# Patient Record
Sex: MALE | Race: WHITE | NOT HISPANIC OR LATINO | Employment: OTHER | URBAN - METROPOLITAN AREA
[De-identification: names, ages, dates, MRNs, and addresses within clinical notes are randomized per-mention and may not be internally consistent; named-entity substitution may affect disease eponyms.]

---

## 2017-09-18 ENCOUNTER — HOSPITAL ENCOUNTER (OUTPATIENT)
Dept: RADIOLOGY | Facility: MEDICAL CENTER | Age: 57
End: 2017-09-18
Attending: ORTHOPAEDIC SURGERY
Payer: COMMERCIAL

## 2017-09-18 DIAGNOSIS — M25.511 RIGHT SHOULDER PAIN, UNSPECIFIED CHRONICITY: ICD-10-CM

## 2017-09-18 PROCEDURE — 73201 CT UPPER EXTREMITY W/DYE: CPT | Mod: RT

## 2017-09-18 PROCEDURE — 700101 HCHG RX REV CODE 250

## 2017-09-18 PROCEDURE — 77002 NEEDLE LOCALIZATION BY XRAY: CPT | Mod: RT

## 2017-09-18 PROCEDURE — 700117 HCHG RX CONTRAST REV CODE 255: Performed by: ORTHOPAEDIC SURGERY

## 2017-09-18 RX ORDER — LIDOCAINE HYDROCHLORIDE 10 MG/ML
INJECTION, SOLUTION INFILTRATION; PERINEURAL
Status: DISPENSED
Start: 2017-09-18 | End: 2017-09-19

## 2017-09-18 RX ADMIN — IOHEXOL 50 ML: 300 INJECTION, SOLUTION INTRAVENOUS at 13:40

## 2017-10-13 DIAGNOSIS — Z01.812 PRE-OPERATIVE LABORATORY EXAMINATION: ICD-10-CM

## 2017-10-13 LAB
BASOPHILS # BLD AUTO: 0.7 % (ref 0–1.8)
BASOPHILS # BLD: 0.03 K/UL (ref 0–0.12)
CRP SERPL HS-MCNC: 0.11 MG/DL (ref 0–0.75)
EOSINOPHIL # BLD AUTO: 0.15 K/UL (ref 0–0.51)
EOSINOPHIL NFR BLD: 3.3 % (ref 0–6.9)
ERYTHROCYTE [DISTWIDTH] IN BLOOD BY AUTOMATED COUNT: 41.1 FL (ref 35.9–50)
ERYTHROCYTE [SEDIMENTATION RATE] IN BLOOD BY WESTERGREN METHOD: 14 MM/HOUR (ref 0–20)
HCT VFR BLD AUTO: 46.2 % (ref 42–52)
HGB BLD-MCNC: 16.3 G/DL (ref 14–18)
IMM GRANULOCYTES # BLD AUTO: 0.02 K/UL (ref 0–0.11)
IMM GRANULOCYTES NFR BLD AUTO: 0.4 % (ref 0–0.9)
LYMPHOCYTES # BLD AUTO: 1.47 K/UL (ref 1–4.8)
LYMPHOCYTES NFR BLD: 32.5 % (ref 22–41)
MCH RBC QN AUTO: 34.1 PG (ref 27–33)
MCHC RBC AUTO-ENTMCNC: 35.3 G/DL (ref 33.7–35.3)
MCV RBC AUTO: 96.7 FL (ref 81.4–97.8)
MONOCYTES # BLD AUTO: 0.51 K/UL (ref 0–0.85)
MONOCYTES NFR BLD AUTO: 11.3 % (ref 0–13.4)
NEUTROPHILS # BLD AUTO: 2.35 K/UL (ref 1.82–7.42)
NEUTROPHILS NFR BLD: 51.8 % (ref 44–72)
NRBC # BLD AUTO: 0 K/UL
NRBC BLD AUTO-RTO: 0 /100 WBC
PLATELET # BLD AUTO: 225 K/UL (ref 164–446)
PMV BLD AUTO: 9.5 FL (ref 9–12.9)
RBC # BLD AUTO: 4.78 M/UL (ref 4.7–6.1)
WBC # BLD AUTO: 4.5 K/UL (ref 4.8–10.8)

## 2017-10-13 PROCEDURE — 85025 COMPLETE CBC W/AUTO DIFF WBC: CPT

## 2017-10-13 PROCEDURE — 36415 COLL VENOUS BLD VENIPUNCTURE: CPT

## 2017-10-13 PROCEDURE — 86140 C-REACTIVE PROTEIN: CPT

## 2017-10-13 PROCEDURE — 85652 RBC SED RATE AUTOMATED: CPT

## 2017-11-28 ENCOUNTER — HOSPITAL ENCOUNTER (OUTPATIENT)
Facility: MEDICAL CENTER | Age: 57
End: 2017-11-28
Attending: ORTHOPAEDIC SURGERY | Admitting: ORTHOPAEDIC SURGERY
Payer: COMMERCIAL

## 2017-11-28 VITALS
TEMPERATURE: 98.1 F | HEIGHT: 73 IN | HEART RATE: 70 BPM | DIASTOLIC BLOOD PRESSURE: 84 MMHG | OXYGEN SATURATION: 94 % | BODY MASS INDEX: 28.84 KG/M2 | SYSTOLIC BLOOD PRESSURE: 146 MMHG | RESPIRATION RATE: 16 BRPM | WEIGHT: 217.59 LBS

## 2017-11-28 LAB
GRAM STN SPEC: NORMAL
SIGNIFICANT IND 70042: NORMAL
SITE SITE: NORMAL
SOURCE SOURCE: NORMAL

## 2017-11-28 PROCEDURE — 160022 HCHG BLOCK: Performed by: ORTHOPAEDIC SURGERY

## 2017-11-28 PROCEDURE — 160025 RECOVERY II MINUTES (STATS): Performed by: ORTHOPAEDIC SURGERY

## 2017-11-28 PROCEDURE — 700105 HCHG RX REV CODE 258: Performed by: ORTHOPAEDIC SURGERY

## 2017-11-28 PROCEDURE — 160046 HCHG PACU - 1ST 60 MINS PHASE II: Performed by: ORTHOPAEDIC SURGERY

## 2017-11-28 PROCEDURE — 500423 HCHG DRESSING, ABD COMBINE: Performed by: ORTHOPAEDIC SURGERY

## 2017-11-28 PROCEDURE — 700101 HCHG RX REV CODE 250

## 2017-11-28 PROCEDURE — 160035 HCHG PACU - 1ST 60 MINS PHASE I: Performed by: ORTHOPAEDIC SURGERY

## 2017-11-28 PROCEDURE — 700111 HCHG RX REV CODE 636 W/ 250 OVERRIDE (IP)

## 2017-11-28 PROCEDURE — 700102 HCHG RX REV CODE 250 W/ 637 OVERRIDE(OP)

## 2017-11-28 PROCEDURE — A4565 SLINGS: HCPCS | Performed by: ORTHOPAEDIC SURGERY

## 2017-11-28 PROCEDURE — 160002 HCHG RECOVERY MINUTES (STAT): Performed by: ORTHOPAEDIC SURGERY

## 2017-11-28 PROCEDURE — 87205 SMEAR GRAM STAIN: CPT

## 2017-11-28 PROCEDURE — 160009 HCHG ANES TIME/MIN: Performed by: ORTHOPAEDIC SURGERY

## 2017-11-28 PROCEDURE — 87070 CULTURE OTHR SPECIMN AEROBIC: CPT

## 2017-11-28 PROCEDURE — 160029 HCHG SURGERY MINUTES - 1ST 30 MINS LEVEL 4: Performed by: ORTHOPAEDIC SURGERY

## 2017-11-28 PROCEDURE — A4450 NON-WATERPROOF TAPE: HCPCS | Performed by: ORTHOPAEDIC SURGERY

## 2017-11-28 PROCEDURE — 160048 HCHG OR STATISTICAL LEVEL 1-5: Performed by: ORTHOPAEDIC SURGERY

## 2017-11-28 PROCEDURE — 160041 HCHG SURGERY MINUTES - EA ADDL 1 MIN LEVEL 4: Performed by: ORTHOPAEDIC SURGERY

## 2017-11-28 PROCEDURE — 501838 HCHG SUTURE GENERAL: Performed by: ORTHOPAEDIC SURGERY

## 2017-11-28 PROCEDURE — A6222 GAUZE <=16 IN NO W/SAL W/O B: HCPCS | Performed by: ORTHOPAEDIC SURGERY

## 2017-11-28 PROCEDURE — 502581 HCHG PACK, SHOULDER ARTHROSCOPY: Performed by: ORTHOPAEDIC SURGERY

## 2017-11-28 PROCEDURE — 87075 CULTR BACTERIA EXCEPT BLOOD: CPT

## 2017-11-28 PROCEDURE — 502240 HCHG MISC OR SUPPLY RC 0272: Performed by: ORTHOPAEDIC SURGERY

## 2017-11-28 PROCEDURE — A9270 NON-COVERED ITEM OR SERVICE: HCPCS

## 2017-11-28 RX ORDER — EPINEPHRINE 1 MG/ML(1)
AMPUL (ML) INJECTION
Status: DISCONTINUED | OUTPATIENT
Start: 2017-11-28 | End: 2017-11-28 | Stop reason: HOSPADM

## 2017-11-28 RX ORDER — SODIUM CHLORIDE, SODIUM LACTATE, POTASSIUM CHLORIDE, CALCIUM CHLORIDE 600; 310; 30; 20 MG/100ML; MG/100ML; MG/100ML; MG/100ML
INJECTION, SOLUTION INTRAVENOUS CONTINUOUS
Status: DISCONTINUED | OUTPATIENT
Start: 2017-11-28 | End: 2017-11-28 | Stop reason: HOSPADM

## 2017-11-28 RX ORDER — LIDOCAINE HYDROCHLORIDE 10 MG/ML
INJECTION, SOLUTION INFILTRATION; PERINEURAL
Status: COMPLETED
Start: 2017-11-28 | End: 2017-11-28

## 2017-11-28 RX ORDER — LISINOPRIL 10 MG/1
10 TABLET ORAL DAILY
COMMUNITY

## 2017-11-28 RX ORDER — OXYCODONE HCL 5 MG/5 ML
SOLUTION, ORAL ORAL
Status: COMPLETED
Start: 2017-11-28 | End: 2017-11-28

## 2017-11-28 RX ADMIN — SODIUM CHLORIDE, POTASSIUM CHLORIDE, SODIUM LACTATE AND CALCIUM CHLORIDE: 600; 310; 30; 20 INJECTION, SOLUTION INTRAVENOUS at 15:00

## 2017-11-28 RX ADMIN — FENTANYL CITRATE 50 MCG: 50 INJECTION, SOLUTION INTRAMUSCULAR; INTRAVENOUS at 18:40

## 2017-11-28 RX ADMIN — OXYCODONE HYDROCHLORIDE 10 MG: 5 SOLUTION ORAL at 18:40

## 2017-11-28 RX ADMIN — LIDOCAINE HYDROCHLORIDE 0.1 ML: 10 INJECTION, SOLUTION INFILTRATION; PERINEURAL at 15:00

## 2017-11-28 RX ADMIN — FENTANYL CITRATE 50 MCG: 50 INJECTION, SOLUTION INTRAMUSCULAR; INTRAVENOUS at 18:49

## 2017-11-28 ASSESSMENT — PAIN SCALES - GENERAL
PAINLEVEL_OUTOF10: 6
PAINLEVEL_OUTOF10: 6
PAINLEVEL_OUTOF10: 8
PAINLEVEL_OUTOF10: 3
PAINLEVEL_OUTOF10: 7

## 2017-11-28 NOTE — OR NURSING
Patient allergies and NPO status verified, home medication reconciliation completed, belongings secured. Patient verbalizes understanding of pain scale, expected course of stay and plan of care. Surgical site verified with patient, IV access established sequentials and MAIRA hose placed on BLE.

## 2017-11-29 NOTE — OR NURSING
1910 - Report from ANGEL Hyatt. Patient awake and cooperative. Pain 6-7/10 and tolerable per patient - states he had chronic pain. States he has had numerous peripheral nerve blocks before and this block did not work this time. Positive CMS RUE. Dressing clean, dry, and intact. Denies nausea - tolerating sips of water. Patient has his chronic pain medications at home. Will take as directed by his PCP.     1920 - Remains as above. Tolerating juice. VS as noted. Meets criteria and transferred to Stage 2 status in PACU with same RN. Patient assisted to bathroom and voided without difficulty. Assisted with dressing then transferred to lounge chair. Friend to chairside.     1930 - Discharge instructions to patient and friend - state understanding.     1945 - Remains as noted above. VS as noted. Meets criteria and discharged via wheelchair to friend to private vehicle.

## 2017-11-29 NOTE — OP REPORT
DATE OF SERVICE:  11/28/2017    PREOPERATIVE DIAGNOSIS:  Painful right total shoulder arthroplasty, rule out   infection.    POSTOPERATIVE DIAGNOSES:  Painful right total shoulder arthroplasty, rule out   infection, subacromial impingement, glenoid micromotion.    PROCEDURE:  Right shoulder arthroscopy, subacromial decompression, synovial   biopsy, diagnostic arthroscopy.    SURGEON:  Jodie Harris MD    ASSISTANT:  Mike De Los Santos CFA.    ANESTHESIOLOGIST:  Harmeet Wills MD    TYPE OF ANESTHESIA:  General, with preoperative interscalene nerve block.    IV FLUID:  1 liter crystalloid.    ESTIMATED BLOOD LOSS:  Minimal.    DRAINS:  None.    SPECIMENS:  Synovium to microbiology, to be held for 2 weeks for P. acnes   evaluation.    COMPLICATIONS:  None.    IMPLANTS:  None.    REASON FOR PROCEDURE:  The patient is a 57-year-old male who has undergone   multiple past bilateral shoulder procedures, most recently a right total   shoulder arthroplasty with me almost 2 years ago.  He did well initially, but   has developed some ongoing pain in the last several months, with no obvious   pathology noted on laboratory workup or CT arthrogram.  We decided to proceed   with a diagnostic arthroscopy and a synovial biopsy.    OPERATION:  The patient was given a right interscalene nerve block by the   anesthesiologist before surgery.  Once back in the operating room, a breathing   tube was placed.  He was given 2 g of IV Ancef.  He was then placed in the   lateral decubitus position.  Care was taken to pad his axilla as well as all   bony prominences.  The right shoulder was examined under anesthesia.  He was   noted to have mildly limited range of motion, and no instability.  The right   upper extremity was then prepped with ChloraPrep and draped in standard   sterile fashion.  It was then placed in the Arthrex traction device.  Bony   landmarks were drawn and a standard posterior portal was established.  The   arthroscope  was inserted into the glenohumeral joint.  An anterosuperior   cannula was placed in the rotator interval, just beneath the biceps tendon.    The rotator cuff was evaluated.  There was no obvious tear.  The biceps tendon   had been previously tenotomized.  There was mild diffuse synovitis noted.    Several synovial biopsies were taken from around the glenoid rim, and the IV   Ancef was given after the specimens were taken, placed in saline, and sent to   microbiology.  The humeral head appeared to be in appropriate position, with   an intact rotator cuff.  The glenoid was then evaluated.  The anterior aspect   was normal.  Portals were switched.  There did appear to be some lift off   posteriorly of the posterior glenoid edge.  There was no gross instability of   the glenoid, but mild lift off the posterosuperior aspect.  The arthroscope   was then inserted into the subacromial space.  A lateral portal was   established.  There was a copious amount of thickened, inflamed bursal tissue.    This was removed with the 4-0 aggressive shaver.  The borders of the   acromion were defined.  The 5.5 mm resector was used to remove the anterior   curve as well as lateral edge.  Portals were switched.  Using a standard   cutting block technique from posterior to anterior, a subacromial   decompression was carried out.  This created a nice smooth surface to the   undersurface of the acromion.  There was a significant anterior spur noted,   and this was removed.  The superficial aspect of the rotator cuff was mildly   scuffed, but intact.  An extensive bursectomy was performed.  All instruments   were then removed.  Portals were closed with 3-0 nylon suture.  A sterile   dressing was applied.  All drapes were removed and the arm was carefully taken   out of traction and placed into a simple sling.  Patient was placed supine on   a stretcher and taken to recovery room, in stable condition.        ____________________________________     MD HERIBERTO ARTEAGA    DD:  11/28/2017 18:22:08  DT:  11/28/2017 18:41:01    D#:  [id number]  Job#:  [id number]

## 2017-11-29 NOTE — OR NURSING
1823 To PACU from OR via gurney, sleeping, respirations spontaneous and non-labored via OPA. VSS. Icepack applied over c/d/i right shoulder  surgical dressings. Radial pulse +2 and cap refill < 3 seconds to RUBAILEY   1830 OPA dc'd at this time. VSS. Pt drowsy but responds to verbal stimuli   1840PT MORE AWAKE C/O 8/10 PAIN. VSS PLAN TO MEDICATE SEE MAR   1900 No change  1910 Gave report to prema ORTIZ, who assumed care of pt

## 2017-11-29 NOTE — DISCHARGE INSTRUCTIONS
ACTIVITY: Rest and take it easy for the first 24 hours.  A responsible adult is recommended to remain with you during that time.  It is normal to feel sleepy.  We encourage you to not do anything that requires balance, judgment or coordination.    MILD FLU-LIKE SYMPTOMS ARE NORMAL. YOU MAY EXPERIENCE GENERALIZED MUSCLE ACHES, THROAT IRRITATION, HEADACHE AND/OR SOME NAUSEA.    FOR 24 HOURS DO NOT:  Drive, operate machinery or run household appliances.  Drink beer or alcoholic beverages.   Make important decisions or sign legal documents.    SPECIAL INSTRUCTIONS:       Post-Operative Shoulder Instructions       Dressing and wound care: Keep your shoulder dressing clean and dry after surgery.  Be aware that some leaking of blood or fluid from your dressing can occur and is normal. You may remove your dressing 3 days after the operation.  Notice that you have a single incision and/or several small incisions that have been closed with stitches.  Cover each of these incisions with a light dressing or band-aids.  This keeps the surgical incisions clean, as well as preventing your clothes from spotting with blood or fluid.  Change band-aids or light dressing daily.     Shower / bathing: Keep the shoulder dry for 3 days after your surgery.  Then, you may shower. You may let soap and water run over skin incisions, but do not immerse your shoulder in water.  No swimming pools, hot tubs, or baths are recommended until at least 3 weeks after surgery.     ** If you have had a shoulder replacement, then please wait until your staples are removed at 7-14 days post-op before allowing your incision to get wet.       Ice: Apply an ice pack to your shoulder (15 minutes on the shoulder, 15 minutes off the shoulder), as you feel necessary to help with the pain and swelling.         Sling / Shoulder Immobilizer:  May remove sling after nerve block wears off and resume light daily activities.     Activity: It is important to move your  shoulder, as well as your elbow, wrist, and hand several times daily, starting the day after surgery.  You may do pendulum exercises with your operative arm starting the day after surgery.  Pendulum (dangling Elem) exercises are encouraged 2-3 times daily.  The sling will need to be removed for pendulum exercises.     Pain medication: Take your pain medicine, as needed and prescribed.  Do not drive or operate machinery while taking narcotic pain medication.   You may start or resume anti-inflammatory medication (i.e. ibuprofen, naproxen) anytime after surgery, which should be taken with food to avoid stomach irritation.     Problems:     If you are having problems with your shoulder (unexpected pain, excessive bleeding or discharge from your incisions, fevers/chills) do not hesitate to call the office or visit the nearest emergency room for evaluation.     Follow-up:     Make sure that you have an appointment 7-14 days following surgery.  Your stitches will be removed, and your procedure/rehabilitation will be discussed at that time.   Physical therapy may be prescribed at that time.         Jodie Harris MD   [facility]   [phone]    DIET: To avoid nausea, slowly advance diet as tolerated, avoiding spicy or greasy foods for the first day.  Add more substantial food to your diet according to your physician's instructions.  Babies can be fed formula or breast milk as soon as they are hungry.  INCREASE FLUIDS AND FIBER TO AVOID CONSTIPATION.      FOLLOW-UP APPOINTMENT:  A follow-up appointment should be arranged with your doctor ; call to schedule.    You should CALL YOUR PHYSICIAN if you develop:  Fever greater than 101 degrees F.  Pain not relieved by medication, or persistent nausea or vomiting.  Excessive bleeding (blood soaking through dressing) or unexpected drainage from the wound.  Extreme redness or swelling around the incision site, drainage of pus or foul smelling drainage.  Inability  to urinate or empty your bladder within 8 hours.  Problems with breathing or chest pain.    You should call 911 if you develop problems with breathing or chest pain.  If you are unable to contact your doctor or surgical center, you should go to the nearest emergency room or urgent care center.  Physician's telephone #: [phone]    If any questions arise, call your doctor.  If your doctor is not available, please feel free to call the Surgical Center at [phone].  The Center is open Monday through Friday from 7AM to 7PM.  You can also call the HEALTH HOTLINE open 24 hours/day, 7 days/week and speak to a nurse at [phone], or toll free at [phone].    A registered nurse may call you a few days after your surgery to see how you are doing after your procedure.    MEDICATIONS: Resume taking daily medication.  Take prescribed pain medication with food.  If no medication is prescribed, you may take non-aspirin pain medication if needed.  PAIN MEDICATION CAN BE VERY CONSTIPATING.  Take a stool softener or laxative such as senokot, pericolace, or milk of magnesia if needed.    Prescription given for NA .  Last pain medication given at 6:40 pm .    If your physician has prescribed pain medication that includes Acetaminophen (Tylenol), do not take additional Acetaminophen (Tylenol) while taking the prescribed medication.    Depression / Suicide Risk    As you are discharged from this [facility] facility, it is important to learn how to keep safe from harming yourself.    Recognize the warning signs:  · Abrupt changes in personality, positive or negative- including increase in energy   · Giving away possessions  · Change in eating patterns- significant weight changes-  positive or negative  · Change in sleeping patterns- unable to sleep or sleeping all the time   · Unwillingness or inability to communicate  · Depression  · Unusual sadness, discouragement and loneliness  · Talk of wanting to die  · Neglect of  personal appearance   · Rebelliousness- reckless behavior  · Withdrawal from people/activities they love  · Confusion- inability to concentrate     If you or a loved one observes any of these behaviors or has concerns about self-harm, here's what you can do:  · Talk about it- your feelings and reasons for harming yourself  · Remove any means that you might use to hurt yourself (examples: pills, rope, extension cords, firearm)  · Get professional help from the community (Mental Health, Substance Abuse, psychological counseling)  · Do not be alone:Call your Safe Contact- someone whom you trust who will be there for you.  · Call your local CRISIS HOTLINE 026-3440 or 090-303-7459  · Call your local Children's Mobile Crisis Response Team [place] (116) 430-8495 or www.[employer]  · Call the toll free National Suicide Prevention Hotlines   · National Suicide Prevention Lifeline 036-123-ATHH (2908)  · National Hope Line Network 800-SUICIDE (288-6046)

## 2017-11-29 NOTE — OR SURGEON
Immediate Post OP Note    PreOp Diagnosis: RIGHT shoulder painful TSA, r/o infection    PostOp Diagnosis: RIGHT shoulder painful TSA, r/o infection, subacromial impingement, glenoid micromotion    Procedure(s): RIGHT  SHOULDER ARTHROSCOPY - SYNOVIAL BIOPSY - Wound Class: Clean  EXAM UNDER ANESTHESIA - Wound Class: Clean  SHOULDER DECOMPRESSION ARTHROSCOPIC- SUBACROMIAL - Wound Class: Clean    Surgeon(s):  Jodie Harris M.D.    Anesthesiologist/Type of Anesthesia:  Anesthesiologist: Harmeet Wills M.D./General    Surgical Staff:  Assistant: Mike De Los Santos C.NCarolACarol  Circulator: Claritza Bauer R.N.  Scrub Person: Luisa Plummer    Specimens:  * No specimens in log *    Estimated Blood Loss: min    Findings: as above    Complications: none        11/28/2017 6:15 PM Jodie Harris

## 2017-12-12 LAB
BACTERIA FLD AEROBE CULT: NORMAL
BACTERIA SPEC ANAEROBE CULT: NORMAL
GRAM STN SPEC: NORMAL
SIGNIFICANT IND 70042: NORMAL
SIGNIFICANT IND 70042: NORMAL
SITE SITE: NORMAL
SITE SITE: NORMAL
SOURCE SOURCE: NORMAL
SOURCE SOURCE: NORMAL

## 2019-02-27 ENCOUNTER — APPOINTMENT (OUTPATIENT)
Dept: RADIOLOGY | Facility: MEDICAL CENTER | Age: 59
DRG: 166 | End: 2019-02-27
Attending: SURGERY
Payer: COMMERCIAL

## 2019-02-27 ENCOUNTER — APPOINTMENT (OUTPATIENT)
Dept: RADIOLOGY | Facility: MEDICAL CENTER | Age: 59
DRG: 166 | End: 2019-02-27
Attending: EMERGENCY MEDICINE
Payer: COMMERCIAL

## 2019-02-27 ENCOUNTER — HOSPITAL ENCOUNTER (INPATIENT)
Facility: MEDICAL CENTER | Age: 59
LOS: 13 days | DRG: 166 | End: 2019-03-12
Attending: EMERGENCY MEDICINE | Admitting: SURGERY
Payer: COMMERCIAL

## 2019-02-27 DIAGNOSIS — S22.5XXA CLOSED FRACTURE OF MULTIPLE RIBS WITH FLAIL CHEST, INITIAL ENCOUNTER: ICD-10-CM

## 2019-02-27 DIAGNOSIS — S22.42XA CLOSED FRACTURE OF MULTIPLE RIBS OF LEFT SIDE, INITIAL ENCOUNTER: ICD-10-CM

## 2019-02-27 PROBLEM — S22.20XA CLOSED FRACTURE OF STERNUM: Status: ACTIVE | Noted: 2019-02-27

## 2019-02-27 PROBLEM — S27.2XXA TRAUMATIC PNEUMOTHORAX AND HEMOTHORAX: Status: ACTIVE | Noted: 2019-02-27

## 2019-02-27 PROBLEM — Z87.81 H/O CLAVICLE FRACTURE: Status: ACTIVE | Noted: 2019-02-27

## 2019-02-27 PROBLEM — Z96.612 S/P BILATERAL SHOULDER JOINT REPLACEMENT: Status: ACTIVE | Noted: 2019-02-27

## 2019-02-27 PROBLEM — S27.321A LEFT PULMONARY CONTUSION: Status: ACTIVE | Noted: 2019-02-27

## 2019-02-27 PROBLEM — Z78.9 NO CONTRAINDICATION TO ANTICOAGULATION THERAPY: Status: ACTIVE | Noted: 2019-02-27

## 2019-02-27 PROBLEM — Z96.611 S/P BILATERAL SHOULDER JOINT REPLACEMENT: Status: ACTIVE | Noted: 2019-02-27

## 2019-02-27 PROBLEM — G89.29 CHRONIC PAIN: Status: ACTIVE | Noted: 2019-02-27

## 2019-02-27 PROBLEM — T14.90XA TRAUMA: Status: ACTIVE | Noted: 2019-02-27

## 2019-02-27 LAB
ABO GROUP BLD: NORMAL
ABO GROUP BLD: NORMAL
ALBUMIN SERPL BCP-MCNC: 4.1 G/DL (ref 3.2–4.9)
ALBUMIN/GLOB SERPL: 1.7 G/DL
ALP SERPL-CCNC: 61 U/L (ref 30–99)
ALT SERPL-CCNC: 30 U/L (ref 2–50)
ANION GAP SERPL CALC-SCNC: 8 MMOL/L (ref 0–11.9)
APTT PPP: 26.9 SEC (ref 24.7–36)
AST SERPL-CCNC: 39 U/L (ref 12–45)
BILIRUB SERPL-MCNC: 1 MG/DL (ref 0.1–1.5)
BLD GP AB SCN SERPL QL: NORMAL
BUN SERPL-MCNC: 14 MG/DL (ref 8–22)
CALCIUM SERPL-MCNC: 8.4 MG/DL (ref 8.5–10.5)
CFT BLD TEG: 4.1 MIN (ref 5–10)
CHLORIDE SERPL-SCNC: 103 MMOL/L (ref 96–112)
CLOT ANGLE BLD TEG: 70 DEGREES (ref 53–72)
CLOT LYSIS 30M P MA LENFR BLD TEG: 0 % (ref 0–8)
CO2 SERPL-SCNC: 24 MMOL/L (ref 20–33)
CREAT SERPL-MCNC: 0.87 MG/DL (ref 0.5–1.4)
CT.EXTRINSIC BLD ROTEM: 1.4 MIN (ref 1–3)
ERYTHROCYTE [DISTWIDTH] IN BLOOD BY AUTOMATED COUNT: 45.4 FL (ref 35.9–50)
ETHANOL BLD-MCNC: 0 G/DL
GLOBULIN SER CALC-MCNC: 2.4 G/DL (ref 1.9–3.5)
GLUCOSE SERPL-MCNC: 150 MG/DL (ref 65–99)
HCT VFR BLD AUTO: 42.7 % (ref 42–52)
HGB BLD-MCNC: 12.2 G/DL (ref 14–18)
HGB BLD-MCNC: 14.6 G/DL (ref 14–18)
HGB BLD-MCNC: 14.7 G/DL (ref 14–18)
INR PPP: 1.09 (ref 0.87–1.13)
MCF BLD TEG: 63.1 MM (ref 50–70)
MCH RBC QN AUTO: 32.4 PG (ref 27–33)
MCHC RBC AUTO-ENTMCNC: 34.2 G/DL (ref 33.7–35.3)
MCV RBC AUTO: 94.9 FL (ref 81.4–97.8)
PA AA BLD-ACNC: 5.6 %
PA ADP BLD-ACNC: 16.4 %
PLATELET # BLD AUTO: 240 K/UL (ref 164–446)
PMV BLD AUTO: 8.7 FL (ref 9–12.9)
POTASSIUM SERPL-SCNC: 3.9 MMOL/L (ref 3.6–5.5)
PROT SERPL-MCNC: 6.5 G/DL (ref 6–8.2)
PROTHROMBIN TIME: 14.2 SEC (ref 12–14.6)
RBC # BLD AUTO: 4.5 M/UL (ref 4.7–6.1)
RH BLD: NORMAL
RH BLD: NORMAL
SODIUM SERPL-SCNC: 135 MMOL/L (ref 135–145)
TEG ALGORITHM TGALG: ABNORMAL
WBC # BLD AUTO: 13.3 K/UL (ref 4.8–10.8)

## 2019-02-27 PROCEDURE — 86900 BLOOD TYPING SEROLOGIC ABO: CPT

## 2019-02-27 PROCEDURE — 700117 HCHG RX CONTRAST REV CODE 255: Performed by: EMERGENCY MEDICINE

## 2019-02-27 PROCEDURE — 80053 COMPREHEN METABOLIC PANEL: CPT

## 2019-02-27 PROCEDURE — 700111 HCHG RX REV CODE 636 W/ 250 OVERRIDE (IP): Performed by: EMERGENCY MEDICINE

## 2019-02-27 PROCEDURE — C1729 CATH, DRAINAGE: HCPCS | Performed by: EMERGENCY MEDICINE

## 2019-02-27 PROCEDURE — 71260 CT THORAX DX C+: CPT

## 2019-02-27 PROCEDURE — 72170 X-RAY EXAM OF PELVIS: CPT

## 2019-02-27 PROCEDURE — 302220 CHEST TUBE TRAY: Performed by: SURGERY

## 2019-02-27 PROCEDURE — 85384 FIBRINOGEN ACTIVITY: CPT

## 2019-02-27 PROCEDURE — 302129 PCA PLUS: Performed by: SURGERY

## 2019-02-27 PROCEDURE — 94667 MNPJ CHEST WALL 1ST: CPT

## 2019-02-27 PROCEDURE — 85576 BLOOD PLATELET AGGREGATION: CPT | Mod: 91

## 2019-02-27 PROCEDURE — G0390 TRAUMA RESPONS W/HOSP CRITI: HCPCS

## 2019-02-27 PROCEDURE — 86850 RBC ANTIBODY SCREEN: CPT

## 2019-02-27 PROCEDURE — 71045 X-RAY EXAM CHEST 1 VIEW: CPT

## 2019-02-27 PROCEDURE — 85018 HEMOGLOBIN: CPT | Mod: 91

## 2019-02-27 PROCEDURE — 700102 HCHG RX REV CODE 250 W/ 637 OVERRIDE(OP): Performed by: SURGERY

## 2019-02-27 PROCEDURE — 96374 THER/PROPH/DIAG INJ IV PUSH: CPT

## 2019-02-27 PROCEDURE — 72125 CT NECK SPINE W/O DYE: CPT

## 2019-02-27 PROCEDURE — 94669 MECHANICAL CHEST WALL OSCILL: CPT

## 2019-02-27 PROCEDURE — 86901 BLOOD TYPING SEROLOGIC RH(D): CPT

## 2019-02-27 PROCEDURE — 99291 CRITICAL CARE FIRST HOUR: CPT

## 2019-02-27 PROCEDURE — 85610 PROTHROMBIN TIME: CPT

## 2019-02-27 PROCEDURE — 700101 HCHG RX REV CODE 250: Performed by: SURGERY

## 2019-02-27 PROCEDURE — 700111 HCHG RX REV CODE 636 W/ 250 OVERRIDE (IP): Performed by: SURGERY

## 2019-02-27 PROCEDURE — 0W9B30Z DRAINAGE OF LEFT PLEURAL CAVITY WITH DRAINAGE DEVICE, PERCUTANEOUS APPROACH: ICD-10-PCS | Performed by: INTERNAL MEDICINE

## 2019-02-27 PROCEDURE — 85027 COMPLETE CBC AUTOMATED: CPT

## 2019-02-27 PROCEDURE — 94760 N-INVAS EAR/PLS OXIMETRY 1: CPT

## 2019-02-27 PROCEDURE — A9270 NON-COVERED ITEM OR SERVICE: HCPCS | Performed by: SURGERY

## 2019-02-27 PROCEDURE — 80307 DRUG TEST PRSMV CHEM ANLYZR: CPT

## 2019-02-27 PROCEDURE — 32551 INSERTION OF CHEST TUBE: CPT

## 2019-02-27 PROCEDURE — 700111 HCHG RX REV CODE 636 W/ 250 OVERRIDE (IP)

## 2019-02-27 PROCEDURE — 72128 CT CHEST SPINE W/O DYE: CPT

## 2019-02-27 PROCEDURE — 85730 THROMBOPLASTIN TIME PARTIAL: CPT

## 2019-02-27 PROCEDURE — 770022 HCHG ROOM/CARE - ICU (200)

## 2019-02-27 PROCEDURE — 85347 COAGULATION TIME ACTIVATED: CPT

## 2019-02-27 PROCEDURE — 99152 MOD SED SAME PHYS/QHP 5/>YRS: CPT

## 2019-02-27 PROCEDURE — 72131 CT LUMBAR SPINE W/O DYE: CPT

## 2019-02-27 PROCEDURE — 70450 CT HEAD/BRAIN W/O DYE: CPT

## 2019-02-27 PROCEDURE — 700105 HCHG RX REV CODE 258: Performed by: SURGERY

## 2019-02-27 RX ORDER — ONDANSETRON 2 MG/ML
4 INJECTION INTRAMUSCULAR; INTRAVENOUS EVERY 4 HOURS PRN
Status: DISCONTINUED | OUTPATIENT
Start: 2019-02-27 | End: 2019-03-12 | Stop reason: HOSPADM

## 2019-02-27 RX ORDER — ACETAMINOPHEN 500 MG
1000 TABLET ORAL EVERY 6 HOURS
Status: DISPENSED | OUTPATIENT
Start: 2019-02-27 | End: 2019-03-04

## 2019-02-27 RX ORDER — MIDAZOLAM HYDROCHLORIDE 1 MG/ML
1-5 INJECTION INTRAMUSCULAR; INTRAVENOUS ONCE
Status: COMPLETED | OUTPATIENT
Start: 2019-02-27 | End: 2019-02-27

## 2019-02-27 RX ORDER — AMOXICILLIN 250 MG
1 CAPSULE ORAL NIGHTLY
Status: DISCONTINUED | OUTPATIENT
Start: 2019-02-27 | End: 2019-03-12 | Stop reason: HOSPADM

## 2019-02-27 RX ORDER — KETOROLAC TROMETHAMINE 30 MG/ML
30 INJECTION, SOLUTION INTRAMUSCULAR; INTRAVENOUS EVERY 6 HOURS
Status: COMPLETED | OUTPATIENT
Start: 2019-02-27 | End: 2019-03-02

## 2019-02-27 RX ORDER — LISINOPRIL 30 MG/1
30 TABLET ORAL EVERY MORNING
COMMUNITY

## 2019-02-27 RX ORDER — LIDOCAINE 50 MG/G
2 PATCH TOPICAL EVERY 24 HOURS
Status: DISCONTINUED | OUTPATIENT
Start: 2019-02-27 | End: 2019-03-06

## 2019-02-27 RX ORDER — AMOXICILLIN 250 MG
1 CAPSULE ORAL
Status: DISCONTINUED | OUTPATIENT
Start: 2019-02-27 | End: 2019-03-12 | Stop reason: HOSPADM

## 2019-02-27 RX ORDER — SODIUM CHLORIDE, SODIUM LACTATE, POTASSIUM CHLORIDE, CALCIUM CHLORIDE 600; 310; 30; 20 MG/100ML; MG/100ML; MG/100ML; MG/100ML
INJECTION, SOLUTION INTRAVENOUS CONTINUOUS
Status: DISCONTINUED | OUTPATIENT
Start: 2019-02-27 | End: 2019-02-28

## 2019-02-27 RX ORDER — POLYETHYLENE GLYCOL 3350 17 G/17G
1 POWDER, FOR SOLUTION ORAL 2 TIMES DAILY
Status: DISCONTINUED | OUTPATIENT
Start: 2019-02-27 | End: 2019-03-12 | Stop reason: HOSPADM

## 2019-02-27 RX ORDER — DOCUSATE SODIUM 100 MG/1
100 CAPSULE, LIQUID FILLED ORAL 2 TIMES DAILY
Status: DISCONTINUED | OUTPATIENT
Start: 2019-02-27 | End: 2019-03-12 | Stop reason: HOSPADM

## 2019-02-27 RX ORDER — ENEMA 19; 7 G/133ML; G/133ML
1 ENEMA RECTAL
Status: DISCONTINUED | OUTPATIENT
Start: 2019-02-27 | End: 2019-03-12 | Stop reason: HOSPADM

## 2019-02-27 RX ORDER — BISACODYL 10 MG
10 SUPPOSITORY, RECTAL RECTAL
Status: DISCONTINUED | OUTPATIENT
Start: 2019-02-27 | End: 2019-03-12 | Stop reason: HOSPADM

## 2019-02-27 RX ORDER — MIDAZOLAM HYDROCHLORIDE 1 MG/ML
INJECTION INTRAMUSCULAR; INTRAVENOUS
Status: COMPLETED
Start: 2019-02-27 | End: 2019-02-27

## 2019-02-27 RX ORDER — GABAPENTIN 100 MG/1
100 CAPSULE ORAL 3 TIMES DAILY
Status: DISCONTINUED | OUTPATIENT
Start: 2019-02-27 | End: 2019-02-28

## 2019-02-27 RX ORDER — FAMOTIDINE 20 MG/1
20 TABLET, FILM COATED ORAL 2 TIMES DAILY
Status: DISCONTINUED | OUTPATIENT
Start: 2019-02-27 | End: 2019-02-28

## 2019-02-27 RX ORDER — PAROXETINE HYDROCHLORIDE 20 MG/1
20 TABLET, FILM COATED ORAL EVERY MORNING
COMMUNITY

## 2019-02-27 RX ADMIN — FENTANYL CITRATE 100 MCG: 50 INJECTION INTRAMUSCULAR; INTRAVENOUS at 17:40

## 2019-02-27 RX ADMIN — KETOROLAC TROMETHAMINE 30 MG: 30 INJECTION, SOLUTION INTRAMUSCULAR; INTRAVENOUS at 18:16

## 2019-02-27 RX ADMIN — MIDAZOLAM HYDROCHLORIDE 5 MG: 1 INJECTION INTRAMUSCULAR; INTRAVENOUS at 17:40

## 2019-02-27 RX ADMIN — FENTANYL CITRATE 50 MCG: 50 INJECTION, SOLUTION INTRAMUSCULAR; INTRAVENOUS at 16:46

## 2019-02-27 RX ADMIN — FENTANYL CITRATE: 50 INJECTION, SOLUTION INTRAMUSCULAR; INTRAVENOUS at 19:22

## 2019-02-27 RX ADMIN — SODIUM CHLORIDE, POTASSIUM CHLORIDE, SODIUM LACTATE AND CALCIUM CHLORIDE: 600; 310; 30; 20 INJECTION, SOLUTION INTRAVENOUS at 19:09

## 2019-02-27 RX ADMIN — MIDAZOLAM HYDROCHLORIDE 5 MG: 1 INJECTION, SOLUTION INTRAMUSCULAR; INTRAVENOUS at 17:40

## 2019-02-27 RX ADMIN — LIDOCAINE 2 PATCH: 50 PATCH TOPICAL at 18:16

## 2019-02-27 RX ADMIN — ACETAMINOPHEN 1000 MG: 500 TABLET ORAL at 18:16

## 2019-02-27 RX ADMIN — IOHEXOL 100 ML: 350 INJECTION, SOLUTION INTRAVENOUS at 17:45

## 2019-02-27 RX ADMIN — FAMOTIDINE 20 MG: 10 INJECTION INTRAVENOUS at 18:21

## 2019-02-27 RX ADMIN — FENTANYL CITRATE 100 MCG: 50 INJECTION, SOLUTION INTRAMUSCULAR; INTRAVENOUS at 17:40

## 2019-02-27 RX ADMIN — GABAPENTIN 100 MG: 100 CAPSULE ORAL at 18:16

## 2019-02-27 ASSESSMENT — COPD QUESTIONNAIRES
DO YOU EVER COUGH UP ANY MUCUS OR PHLEGM?: NO/ONLY WITH OCCASIONAL COLDS OR INFECTIONS
COPD SCREENING SCORE: 2
HAVE YOU SMOKED AT LEAST 100 CIGARETTES IN YOUR ENTIRE LIFE: NO/DON'T KNOW
DURING THE PAST 4 WEEKS HOW MUCH DID YOU FEEL SHORT OF BREATH: NONE/LITTLE OF THE TIME

## 2019-02-27 ASSESSMENT — PATIENT HEALTH QUESTIONNAIRE - PHQ9
SUM OF ALL RESPONSES TO PHQ9 QUESTIONS 1 AND 2: 0
2. FEELING DOWN, DEPRESSED, IRRITABLE, OR HOPELESS: NOT AT ALL
1. LITTLE INTEREST OR PLEASURE IN DOING THINGS: NOT AT ALL

## 2019-02-27 ASSESSMENT — LIFESTYLE VARIABLES: EVER_SMOKED: NEVER

## 2019-02-27 ASSESSMENT — ENCOUNTER SYMPTOMS
SHORTNESS OF BREATH: 1
LOSS OF CONSCIOUSNESS: 0

## 2019-02-28 ENCOUNTER — APPOINTMENT (OUTPATIENT)
Dept: RADIOLOGY | Facility: MEDICAL CENTER | Age: 59
DRG: 166 | End: 2019-02-28
Attending: NURSE PRACTITIONER
Payer: COMMERCIAL

## 2019-02-28 ENCOUNTER — APPOINTMENT (OUTPATIENT)
Dept: RADIOLOGY | Facility: MEDICAL CENTER | Age: 59
DRG: 166 | End: 2019-02-28
Attending: SURGERY
Payer: COMMERCIAL

## 2019-02-28 LAB
ALBUMIN SERPL BCP-MCNC: 3.3 G/DL (ref 3.2–4.9)
ALBUMIN/GLOB SERPL: 1.6 G/DL
ALP SERPL-CCNC: 47 U/L (ref 30–99)
ALT SERPL-CCNC: 28 U/L (ref 2–50)
ANION GAP SERPL CALC-SCNC: 7 MMOL/L (ref 0–11.9)
AST SERPL-CCNC: 50 U/L (ref 12–45)
BASOPHILS # BLD AUTO: 0.3 % (ref 0–1.8)
BASOPHILS # BLD: 0.02 K/UL (ref 0–0.12)
BILIRUB SERPL-MCNC: 1.8 MG/DL (ref 0.1–1.5)
BUN SERPL-MCNC: 12 MG/DL (ref 8–22)
CALCIUM SERPL-MCNC: 7.8 MG/DL (ref 8.5–10.5)
CHLORIDE SERPL-SCNC: 103 MMOL/L (ref 96–112)
CO2 SERPL-SCNC: 23 MMOL/L (ref 20–33)
CREAT SERPL-MCNC: 0.75 MG/DL (ref 0.5–1.4)
EOSINOPHIL # BLD AUTO: 0.01 K/UL (ref 0–0.51)
EOSINOPHIL NFR BLD: 0.2 % (ref 0–6.9)
ERYTHROCYTE [DISTWIDTH] IN BLOOD BY AUTOMATED COUNT: 46 FL (ref 35.9–50)
GLOBULIN SER CALC-MCNC: 2.1 G/DL (ref 1.9–3.5)
GLUCOSE SERPL-MCNC: 110 MG/DL (ref 65–99)
HCT VFR BLD AUTO: 32.7 % (ref 42–52)
HCT VFR BLD AUTO: 35.9 % (ref 42–52)
HGB BLD-MCNC: 10 G/DL (ref 14–18)
HGB BLD-MCNC: 11.2 G/DL (ref 14–18)
HGB BLD-MCNC: 12 G/DL (ref 14–18)
IMM GRANULOCYTES # BLD AUTO: 0.02 K/UL (ref 0–0.11)
IMM GRANULOCYTES NFR BLD AUTO: 0.3 % (ref 0–0.9)
LYMPHOCYTES # BLD AUTO: 0.83 K/UL (ref 1–4.8)
LYMPHOCYTES NFR BLD: 14 % (ref 22–41)
MAGNESIUM SERPL-MCNC: 1.9 MG/DL (ref 1.5–2.5)
MCH RBC QN AUTO: 32.4 PG (ref 27–33)
MCHC RBC AUTO-ENTMCNC: 34.3 G/DL (ref 33.7–35.3)
MCV RBC AUTO: 94.5 FL (ref 81.4–97.8)
MONOCYTES # BLD AUTO: 0.61 K/UL (ref 0–0.85)
MONOCYTES NFR BLD AUTO: 10.3 % (ref 0–13.4)
NEUTROPHILS # BLD AUTO: 4.43 K/UL (ref 1.82–7.42)
NEUTROPHILS NFR BLD: 74.9 % (ref 44–72)
NRBC # BLD AUTO: 0 K/UL
NRBC BLD-RTO: 0 /100 WBC
PHOSPHATE SERPL-MCNC: 4.1 MG/DL (ref 2.5–4.5)
PLATELET # BLD AUTO: 148 K/UL (ref 164–446)
PMV BLD AUTO: 8.6 FL (ref 9–12.9)
POTASSIUM SERPL-SCNC: 3.7 MMOL/L (ref 3.6–5.5)
PROT SERPL-MCNC: 5.4 G/DL (ref 6–8.2)
RBC # BLD AUTO: 3.46 M/UL (ref 4.7–6.1)
SODIUM SERPL-SCNC: 133 MMOL/L (ref 135–145)
WBC # BLD AUTO: 5.9 K/UL (ref 4.8–10.8)

## 2019-02-28 PROCEDURE — 700102 HCHG RX REV CODE 250 W/ 637 OVERRIDE(OP): Performed by: SURGERY

## 2019-02-28 PROCEDURE — 71045 X-RAY EXAM CHEST 1 VIEW: CPT

## 2019-02-28 PROCEDURE — 85025 COMPLETE CBC W/AUTO DIFF WBC: CPT

## 2019-02-28 PROCEDURE — 94669 MECHANICAL CHEST WALL OSCILL: CPT

## 2019-02-28 PROCEDURE — 700112 HCHG RX REV CODE 229: Performed by: SURGERY

## 2019-02-28 PROCEDURE — 84100 ASSAY OF PHOSPHORUS: CPT

## 2019-02-28 PROCEDURE — 99291 CRITICAL CARE FIRST HOUR: CPT | Performed by: SURGERY

## 2019-02-28 PROCEDURE — 94668 MNPJ CHEST WALL SBSQ: CPT

## 2019-02-28 PROCEDURE — A9270 NON-COVERED ITEM OR SERVICE: HCPCS | Performed by: SURGERY

## 2019-02-28 PROCEDURE — 700111 HCHG RX REV CODE 636 W/ 250 OVERRIDE (IP): Performed by: SURGERY

## 2019-02-28 PROCEDURE — 80053 COMPREHEN METABOLIC PANEL: CPT

## 2019-02-28 PROCEDURE — 770022 HCHG ROOM/CARE - ICU (200)

## 2019-02-28 PROCEDURE — 85018 HEMOGLOBIN: CPT | Mod: 91

## 2019-02-28 PROCEDURE — 83735 ASSAY OF MAGNESIUM: CPT

## 2019-02-28 PROCEDURE — 700101 HCHG RX REV CODE 250: Performed by: SURGERY

## 2019-02-28 PROCEDURE — 85014 HEMATOCRIT: CPT

## 2019-02-28 RX ORDER — MORPHINE SULFATE 15 MG/1
15 TABLET, FILM COATED, EXTENDED RELEASE ORAL EVERY 12 HOURS
Status: DISCONTINUED | OUTPATIENT
Start: 2019-02-28 | End: 2019-03-01

## 2019-02-28 RX ORDER — HYDROMORPHONE HYDROCHLORIDE 2 MG/ML
.5-1 INJECTION, SOLUTION INTRAMUSCULAR; INTRAVENOUS; SUBCUTANEOUS
Status: DISCONTINUED | OUTPATIENT
Start: 2019-02-28 | End: 2019-03-06

## 2019-02-28 RX ORDER — DIAZEPAM 2 MG/1
5 TABLET ORAL EVERY 6 HOURS PRN
Status: DISCONTINUED | OUTPATIENT
Start: 2019-02-28 | End: 2019-03-01

## 2019-02-28 RX ORDER — GABAPENTIN 300 MG/1
300 CAPSULE ORAL 3 TIMES DAILY
Status: DISCONTINUED | OUTPATIENT
Start: 2019-02-28 | End: 2019-03-12 | Stop reason: HOSPADM

## 2019-02-28 RX ORDER — OXYCODONE HYDROCHLORIDE 15 MG/1
15 TABLET ORAL EVERY 8 HOURS PRN
Status: ON HOLD | COMMUNITY
End: 2019-03-12

## 2019-02-28 RX ADMIN — MORPHINE SULFATE 15 MG: 15 TABLET, EXTENDED RELEASE ORAL at 18:34

## 2019-02-28 RX ADMIN — ENOXAPARIN SODIUM 30 MG: 100 INJECTION SUBCUTANEOUS at 05:56

## 2019-02-28 RX ADMIN — ACETAMINOPHEN 1000 MG: 500 TABLET ORAL at 18:34

## 2019-02-28 RX ADMIN — KETOROLAC TROMETHAMINE 30 MG: 30 INJECTION, SOLUTION INTRAMUSCULAR; INTRAVENOUS at 18:23

## 2019-02-28 RX ADMIN — OXYCODONE HYDROCHLORIDE 15 MG: 5 TABLET ORAL at 18:33

## 2019-02-28 RX ADMIN — DOCUSATE SODIUM 100 MG: 100 CAPSULE, LIQUID FILLED ORAL at 05:54

## 2019-02-28 RX ADMIN — OXYCODONE HYDROCHLORIDE 15 MG: 5 TABLET ORAL at 12:38

## 2019-02-28 RX ADMIN — DIAZEPAM 5 MG: 2 TABLET ORAL at 21:54

## 2019-02-28 RX ADMIN — DOCUSATE SODIUM 100 MG: 100 CAPSULE, LIQUID FILLED ORAL at 18:34

## 2019-02-28 RX ADMIN — OXYCODONE HYDROCHLORIDE 15 MG: 5 TABLET ORAL at 09:37

## 2019-02-28 RX ADMIN — ACETAMINOPHEN 1000 MG: 500 TABLET ORAL at 23:21

## 2019-02-28 RX ADMIN — GABAPENTIN 300 MG: 300 CAPSULE ORAL at 18:34

## 2019-02-28 RX ADMIN — FAMOTIDINE 20 MG: 20 TABLET ORAL at 05:54

## 2019-02-28 RX ADMIN — ACETAMINOPHEN 1000 MG: 500 TABLET ORAL at 00:11

## 2019-02-28 RX ADMIN — KETOROLAC TROMETHAMINE 30 MG: 30 INJECTION, SOLUTION INTRAMUSCULAR; INTRAVENOUS at 05:56

## 2019-02-28 RX ADMIN — HYDROMORPHONE HYDROCHLORIDE 1 MG: 2 INJECTION INTRAMUSCULAR; INTRAVENOUS; SUBCUTANEOUS at 23:21

## 2019-02-28 RX ADMIN — ENOXAPARIN SODIUM 30 MG: 100 INJECTION SUBCUTANEOUS at 18:34

## 2019-02-28 RX ADMIN — ACETAMINOPHEN 1000 MG: 500 TABLET ORAL at 12:36

## 2019-02-28 RX ADMIN — ACETAMINOPHEN 1000 MG: 500 TABLET ORAL at 05:54

## 2019-02-28 RX ADMIN — GABAPENTIN 100 MG: 100 CAPSULE ORAL at 05:54

## 2019-02-28 RX ADMIN — MORPHINE SULFATE 15 MG: 15 TABLET, EXTENDED RELEASE ORAL at 09:37

## 2019-02-28 RX ADMIN — OXYCODONE HYDROCHLORIDE 15 MG: 5 TABLET ORAL at 21:54

## 2019-02-28 RX ADMIN — LIDOCAINE 2 PATCH: 50 PATCH TOPICAL at 18:24

## 2019-02-28 RX ADMIN — SENNOSIDES AND DOCUSATE SODIUM 1 TABLET: 8.6; 5 TABLET ORAL at 21:54

## 2019-02-28 RX ADMIN — HYDROMORPHONE HYDROCHLORIDE 1 MG: 2 INJECTION INTRAMUSCULAR; INTRAVENOUS; SUBCUTANEOUS at 12:35

## 2019-02-28 RX ADMIN — KETOROLAC TROMETHAMINE 30 MG: 30 INJECTION, SOLUTION INTRAMUSCULAR; INTRAVENOUS at 00:11

## 2019-02-28 RX ADMIN — HYDROMORPHONE HYDROCHLORIDE 1 MG: 2 INJECTION INTRAMUSCULAR; INTRAVENOUS; SUBCUTANEOUS at 18:22

## 2019-02-28 RX ADMIN — DIAZEPAM 5 MG: 2 TABLET ORAL at 15:10

## 2019-02-28 RX ADMIN — KETOROLAC TROMETHAMINE 30 MG: 30 INJECTION, SOLUTION INTRAMUSCULAR; INTRAVENOUS at 12:36

## 2019-02-28 RX ADMIN — GABAPENTIN 300 MG: 300 CAPSULE ORAL at 12:36

## 2019-02-28 RX ADMIN — KETOROLAC TROMETHAMINE 30 MG: 30 INJECTION, SOLUTION INTRAMUSCULAR; INTRAVENOUS at 23:21

## 2019-02-28 NOTE — PROCEDURES
DATE OF OPERATION:  2/27/2019    PROCEDURE PERFORMED:  Left chest tube insertion -  28 fr.    INDICATION:  Left hemopneumothorax, left flail chest    SEDATION:  Versed 5 mg, fentanyl 100 mcg.  Local anesthesia 1% lidocaine 20 mL    DESCRIPTION OF PROCEDURE:  After informed consent was obtained, the  left chest prepped with ChloraPrep, widely draped. Local anesthesia was infiltrated   in the skin and subcutaneous tissues.  Skin incised with a scalpel.  A clamp was   used to dissect through the subcutaneous tissues and then intercostals.    Going over the top of the 7th rib, intercostals were divided and the clamp was used   to enter the pleural cavity and spread. There was a rush of air and small amount of   blood.  A 28 Macanese chest tube was directed through this; directed superiorly to 12 cm.   The chest tube was connected to an [employer] collection unit.  The tube was secured to skin   with a pursestring Ethibond suture.  Dressing was placed over insertion site and   secured with tape.  Connections were all secured with tape.  Patient tolerated   procedure well.  Stat portable chest x-ray is ordered.         ____________________________________     MAL DUMONT MD

## 2019-02-28 NOTE — ED NOTES
Pt at work and fell approximately 15 feet onto block of ice. GCS 15. On backboard, claimed LOC, was alert and responsive when EMS arrived.

## 2019-02-28 NOTE — CARE PLAN
Problem: Pain Management  Goal: Pain level will decrease to patient's comfort goal    Intervention: Follow pain managment plan developed in collaboration with patient and Interdisciplinary Team  Pt assessed for pain and medications administered as needed per the MAR.      Problem: Respiratory:  Goal: Respiratory status will improve    Intervention: Educate and encourage coughing and deep breathing  Pt educated on and encouraged to continue deep breathing and coughing.

## 2019-02-28 NOTE — CARE PLAN
Problem: Hyperinflation:  Goal: Prevent or improve atelectasis    Intervention: Perform hyperinflation therapy as indicated by assessment  PEP/IS  Pt achieves 3500mL with good effort and technique.

## 2019-02-28 NOTE — ED PROVIDER NOTES
ED Provider Note    Scribed for Dawood Rodriguez M.D.. by Benedict Morrow. 2/27/2019, 4:40 PM.    Primary care provider: PCP, none noted.  Means of arrival: Ambulance  History obtained from: EMS  History limited by: None    CHIEF COMPLAINT  Trauma Red    LUANNE Boone is a 58 year old male who presents to the Emergency Department as a trauma red for evaluation after a 15 foot fall occurring prior to arrival. The patient was on the roof at his work when he fell off into the snow. EMS arrived and used a pulley system to lower him down to street level from the snow. The patient complains of shortness of breath and 9/10 left rib pain that started prior to being lowered down by EMS. He denies any loss of consciousness. Per EMS, the he has a flail segment, is alert and oriented x4 and has a GCS of 15. His last vital signs for EMS was a blood pressure of 164/94, heart rate of 80, respirations of 24 and oxygen saturation of 97%. He was given Fentanyl 200 mcg without relief. The patient takes Morphine daily for pain.    REVIEW OF SYSTEMS  Review of Systems   Respiratory: Positive for shortness of breath.    Musculoskeletal:        Rib pain   Neurological: Negative for loss of consciousness.   All other systems reviewed and are negative.      PAST MEDICAL HISTORY  Pain for which he takes Morphine.    SURGICAL HISTORY  patient denies any surgical history    SOCIAL HISTORY  Accompanied by EMS.    FAMILY HISTORY  None noted.    CURRENT MEDICATIONS    Current Facility-Administered Medications:   •  Respiratory Care per Protocol, , Nebulization, Continuous RT, Mike Barragan M.D.  •  Pharmacy Consult Request ...Pain Management Review 1 Each, 1 Each, Other, PHARMACY TO DOSE, Mike Barragan M.D.  •  docusate sodium (COLACE) capsule 100 mg, 100 mg, Oral, BID, Mike Barragan M.D.  •  senna-docusate (PERICOLACE or SENOKOT S) 8.6-50 MG per tablet 1 Tab, 1 Tab, Oral, Nightly, Mike Barragan M.D.  •   "senna-docusate (PERICOLACE or SENOKOT S) 8.6-50 MG per tablet 1 Tab, 1 Tab, Oral, Q24HRS PRN, Mike Barragan M.D.  •  polyethylene glycol/lytes (MIRALAX) PACKET 1 Packet, 1 Packet, Oral, BID, Mike Barragan M.D.  •  magnesium hydroxide (MILK OF MAGNESIA) suspension 30 mL, 30 mL, Oral, DAILY, Mike Barragan M.D.  •  bisacodyl (DULCOLAX) suppository 10 mg, 10 mg, Rectal, Q24HRS PRN, Mike Barragan M.D.  •  fleet enema 133 mL, 1 Each, Rectal, Once PRN, Mike Barragan M.D.  •  LR infusion, , Intravenous, Continuous, Mike Barragan M.D., Last Rate: 100 mL/hr at 02/27/19 1909  •  [START ON 2/28/2019] enoxaparin (LOVENOX) inj 30 mg, 30 mg, Subcutaneous, Q12HRS, Mike Barragan M.D.  •  acetaminophen (TYLENOL) tablet 1,000 mg, 1,000 mg, Oral, Q6HRS, Mike Barragan M.D., 1,000 mg at 02/27/19 1816  •  ketorolac (TORADOL) injection 30 mg, 30 mg, Intravenous, Q6HRS, Mike Barragan M.D., 30 mg at 02/27/19 1816  •  fentaNYL (SUBLIMAZE) injection 50 mcg, 50 mcg, Intravenous, Q3HRS PRN, Mike Barragan M.D., 100 mcg at 02/27/19 1740  •  fentaNYL (SUBLIMAZE) 50 mcg/mL in 50 mL (PCA), , Intravenous, Continuous, Mike Barragan M.D.  •  famotidine (PEPCID) tablet 20 mg, 20 mg, Oral, BID **OR** famotidine (PEPCID) injection 20 mg, 20 mg, Intravenous, BID, Mike Barragan M.D., 20 mg at 02/27/19 1821  •  ondansetron (ZOFRAN) syringe/vial injection 4 mg, 4 mg, Intravenous, Q4HRS PRN, Mike Barragan M.D.  •  gabapentin (NEURONTIN) capsule 100 mg, 100 mg, Oral, TID, Mike Barragan M.D., 100 mg at 02/27/19 1816  •  lidocaine (LIDODERM) 5 % 2 Patch, 2 Patch, Transdermal, Q24HR, Mike Barragan M.D., 2 Patch at 02/27/19 1816    ALLERGIES  No Known Allergies    PHYSICAL EXAM  VITAL SIGNS: BP (!) 166/101   Pulse 83   Temp 36.3 °C (97.3 °F) (Temporal)   Resp 22   Ht 1.854 m (6' 1\")   Wt 96.6 kg (213 lb)   SpO2 100%   BMI 28.10 kg/m²     Constitutional: " Well developed, Well nourished, In moderate distress, Non-toxic appearance.   HENT: Normocephalic, Atraumatic, Bilateral external ears normal, oropharynx moist, No oral exudates, Nose normal.   Eyes: Pupils are equal round and react to light, extraocular motions are intact, conjunctiva is normal, there are no signs of exudate.   Neck: Non tender midline, trachea is midline  Cardiovascular: Regular rate and rhythm without murmurs gallops or rubs.   Thorax & Lungs: Diminished left breath sounds, Lungs are clear to auscultation bilaterally, there are no wheezes no rales. Obvious left flail chest, left sided chest wall tenderness.  Abdomen: Soft, nontender, nondistended. Bowel sounds are present. Atraumatic.   Skin: Warm, Dry, No erythema,   Back: No midline tenderness, atraumatic  Musculoskeletal: Good range of motion in all major joints. No tenderness to palpation or major deformities noted. Intact distal pulses, no clubbing, no cyanosis, no edema,   Neurologic: Alert & oriented x 3, Normal motor function, Normal sensory function, No focal deficits noted. GCS 15.  Psychiatric: Affect normal, Judgment normal, Mood normal.     LABS  Results for orders placed or performed during the hospital encounter of 02/27/19   DIAGNOSTIC ALCOHOL   Result Value Ref Range    Diagnostic Alcohol 0.00 0.00 g/dL   CBC WITHOUT DIFFERENTIAL   Result Value Ref Range    WBC 13.3 (H) 4.8 - 10.8 K/uL    RBC 4.50 (L) 4.70 - 6.10 M/uL    Hemoglobin 14.6 14.0 - 18.0 g/dL    Hematocrit 42.7 42.0 - 52.0 %    MCV 94.9 81.4 - 97.8 fL    MCH 32.4 27.0 - 33.0 pg    MCHC 34.2 33.7 - 35.3 g/dL    RDW 45.4 35.9 - 50.0 fL    Platelet Count 240 164 - 446 K/uL    MPV 8.7 (L) 9.0 - 12.9 fL   Comp Metabolic Panel   Result Value Ref Range    Sodium 135 135 - 145 mmol/L    Potassium 3.9 3.6 - 5.5 mmol/L    Chloride 103 96 - 112 mmol/L    Co2 24 20 - 33 mmol/L    Anion Gap 8.0 0.0 - 11.9    Glucose 150 (H) 65 - 99 mg/dL    Bun 14 8 - 22 mg/dL    Creatinine 0.87  0.50 - 1.40 mg/dL    Calcium 8.4 (L) 8.5 - 10.5 mg/dL    AST(SGOT) 39 12 - 45 U/L    ALT(SGPT) 30 2 - 50 U/L    Alkaline Phosphatase 61 30 - 99 U/L    Total Bilirubin 1.0 0.1 - 1.5 mg/dL    Albumin 4.1 3.2 - 4.9 g/dL    Total Protein 6.5 6.0 - 8.2 g/dL    Globulin 2.4 1.9 - 3.5 g/dL    A-G Ratio 1.7 g/dL   Prothrombin Time   Result Value Ref Range    PT 14.2 12.0 - 14.6 sec    INR 1.09 0.87 - 1.13   APTT   Result Value Ref Range    APTT 26.9 24.7 - 36.0 sec   PLATELET MAPPING WITH BASIC TEG   Result Value Ref Range    Reaction Time Initial-R 4.1 (L) 5.0 - 10.0 min    Clot Kinetics-K 1.4 1.0 - 3.0 min    Clot Angle-Angle 70.0 53.0 - 72.0 degrees    Maximum Clot Strength-MA 63.1 50.0 - 70.0 mm    Lysis 30 minutes-LY30 0.0 0.0 - 8.0 %    % Inhibition ADP 16.4 %    % Inhibition AA 5.6 %    TEG Algorithm Link Algorithm    COD - Adult (Type and Screen)   Result Value Ref Range    ABO Grouping Only O     Rh Grouping Only POS     Antibody Screen-Cod NEG    ABO AND RH CONFIRMATION   Result Value Ref Range    ABO Confirm O     Second Rh Group POS    ESTIMATED GFR   Result Value Ref Range    GFR If African American >60 >60 mL/min/1.73 m 2    GFR If Non African American >60 >60 mL/min/1.73 m 2   Hemoglobin - STAT   Result Value Ref Range    Hemoglobin 14.7 14.0 - 18.0 g/dL   Hemoglobin - Q6 hours x4   Result Value Ref Range    Hemoglobin 12.2 (L) 14.0 - 18.0 g/dL     All labs reviewed by me.    RADIOLOGY  DX-CHEST-LIMITED (1 VIEW)   Final Result         Placement of left-sided chest tube without obvious pneumothorax on chest x-ray.      Left lung consolidation consistent with pulmonary contusion.      Multiple left-sided rib fractures.      Subcutaneous emphysema.      CT-TSPINE W/O PLUS RECONS   Final Result         No acute fracture or subluxation of the thoracic spine.      CT-CHEST,ABDOMEN,PELVIS WITH   Final Result      Left-sided rib fractures involving all ribs with the exception of the 12th rib. Some rib fractures are  segmental as described above with disruption of the costochondral cartilage.      Small left-sided hemopneumothorax and pneumomediastinum with subcutaneous emphysema.      Left lung pulmonary contusion.      Apparent nondisplaced inferior sternal fracture.      No evidence of abdominal solid organ injury or free fluid.      This was discussed with Dr. Franks at 5:35 PM.      CT-LSPINE W/O PLUS RECONS   Final Result      NO ACUTE FRACTURE OR DISLOCATION IS PRESENT IN THE LUMBAR SPINE.      CT-CSPINE WITHOUT PLUS RECONS   Final Result      Degenerative change without evidence of cervical spine fracture.      CT-HEAD W/O   Final Result      1.  White matter lucencies most consistent with small vessel ischemic change versus demyelination or gliosis.      2.  Otherwise, Head CT without contrast with no acute findings. No evidence of acute cerebral infarction, hemorrhage or mass lesion.      DX-CHEST-PORTABLE (1 VIEW)   Final Result      Multiple left-sided rib fractures with subcutaneous emphysema.      No gross evidence of pneumothorax on supine radiograph with significant artifact from backboard.      Left midlung consolidation.      Prominent mediastinum.      DX-PELVIS-1 OR 2 VIEWS   Final Result      No evidence of pelvic or proximal femoral fracture.      DX-CHEST-PORTABLE (1 VIEW)    (Results Pending)     The radiologist's interpretation of all radiological studies have been reviewed by me.    COURSE & MEDICAL DECISION MAKING  Pertinent Labs & Imaging studies reviewed. (See chart for details)    4:40 PM - Patient seen and examined in the trauma bay. Patient will be treated with Sublimaze injection. Ordered CT-Chest, Abdomen, Pelvis, CT-C Spine, CT-Head, CT-L Spine, CT-T Spine, DX pelvis, DX chest, diagnostic alcohol, CBC, CMP, Prothrombin time, APTT, Platelet mapping and COD to evaluate his symptoms. I informed the patient that he will need to undergo labs and imaging for evaluation. I informed the patient that  he will be admitted for treatment. The patient understands and agrees to admission.    4:41 PM Dr. Barragan, Trauma, arrived.    Decision Making:   Patient presents as a trauma red activation.  Upon arrival we were there with the trauma team.  The patient was otherwise stable.  CT scans laboratory studies were done the above fashion.  The patient has multiple rib fractures with flail segment of the left chest.  The patient be taken to the ICU under the care of trauma surgery.    DISPOSITION:  Patient will be admitted to Dr. Barragan, Trauma in guarded condition.    FINAL IMPRESSION  1. Closed fracture of multiple ribs of left side, initial encounter    2. Closed fracture of multiple ribs with flail chest, initial encounter          Benedict JIMENES (Scribe), am scribing for, and in the presence of, Dawood Rodriguez M.D..    Electronically signed by: Benedict Morrow (Scribe), 2/27/2019    IDawood M.D. personally performed the services described in this documentation, as scribed by Benedict Morrow in my presence, and it is both accurate and complete. C    The note accurately reflects work and decisions made by me.  Dawood Rodriguez  2/27/2019  11:10 PM

## 2019-02-28 NOTE — CARE PLAN
Problem: Pain Management  Goal: Pain level will decrease to patient's comfort goal    Intervention: Follow pain managment plan developed in collaboration with patient and Interdisciplinary Team  Pain assessed utilizing 0-10 pain assessment scale. Pain medications given per MAR        Problem: Safety  Goal: Will remain free from falls    Intervention: Implement fall precautions  Bed in lowest/locked position, call light within reach, patient verbalizes understanding to call staff for assistance prior to attempting mobilization.

## 2019-02-28 NOTE — ASSESSMENT & PLAN NOTE
Premorbid condition treated with MS Contin 15 mg BID and Oxycodone 15 mg TID.  Home meds resumed.  3/2 Increase MS Contin to 30 BID.  3/5 Increased MS Contin to 45 BID.  3/9 Add skelaxin

## 2019-02-28 NOTE — DISCHARGE PLANNING
Trauma Response    Referral: Trauma Red Response    Intervention: SW responded to trauma Red.  Pt was BIB [employer] after 15 Foot Fall .  Pt was awake and talking upon arrival.  Pts name is Nathen Saunders (: [phone]).  SW obtained the following pt information from [employer] and Pt wallet.  ZACARIAS was able to contact pts germán Cortez at [phone] with Pt consent. Blashelby is aware and states he will come to the Hospital.     Plan: SW will await family arrival . Pt will be admitted to SICU.

## 2019-02-28 NOTE — ASSESSMENT & PLAN NOTE
Nondisplaced inferior sternal fracture. No retrosternal hematoma.  Aggressive pulmonary hygiene and multimodal pain management.

## 2019-02-28 NOTE — PROGRESS NOTES
"Trauma Progress Note 2/28/2019 2:32 AM    Briefly, this is a 58 y.o. male with multiple left rib fractures and left anterior lateral flail chest, sternal fracture, and left hemopneumothorax requiring placement of a left chest tube.    /72   Pulse 70   Temp 36.7 °C (98.1 °F) (Temporal)   Resp 19   Ht 1.854 m (6' 1\")   Wt 100.2 kg (220 lb 14.4 oz)   SpO2 97%   BMI 29.14 kg/m²     Hemoglobin: 11.2 g/dL  Hematocrit: 32.7 %    Assessment: Stable oxygen requirements through the night, 3L via nasal canula; adequate pain control with a Fentanyl PCA, incentive spirometry 1750 ml. Chest tube with minimal bloody drainage (100ml).    Additional plans: Continue aggressive pulmonary hygiene and pain control     Patient  data reviewed and discussed.  Agree with assessment and plan.  SERA            "

## 2019-02-28 NOTE — PROGRESS NOTES
2 RN skin assessment complete. No pressure related areas of concern at this time. Small abrasion to left trunk, chest tube site CDI, dressing changed per protocol.

## 2019-02-28 NOTE — RESPIRATORY CARE
Responded to trauma red. Placed pt on 10l non rebreather, sat 97%. Respiratory released by trauma surgeon.

## 2019-02-28 NOTE — ASSESSMENT & PLAN NOTE
15 foot fall from roof onto ice chunks.  Trauma Red Activation.  Mike Barragan MD. Trauma Surgery.

## 2019-02-28 NOTE — H&P
Trauma History and Physical  2/27/2019    Attending Physician: Mike Barragan MD.     CC: Trauma The patient was triaged as a Trauma Red in accordance with established pre hospital protols. An expeditious primary and secondary survey with required adjuncts was conducted. See Trauma Narrator for full details.    HPI: This is a 58 y.o male presents to [facility] Emergency Department after a 15 foot fall from a roof onto ice The patient was on the roof clearing an ice dam that had formed. The ice suddenly released and he went with it. EMS arrived and used a pulley system to lower him down to street level from the snow. The patient complains of shortness of breath and 9/10 left rib pain that started prior to being lowered down by EMS. He denies any loss of consciousness.     Per EMS, the he has a left flail segment with paradoxical movement.  He is alert and oriented x4 and has a GCS of 15. His last vital signs for EMS was a blood pressure of 164/94, heart rate of 80, respirations of 24 and oxygen saturation of 97%. He was given Fentanyl 200 mcg with minimal relief. The patient takes MS Contin daily for pain.    No past medical history on file.    No past surgical history on file.    Current Facility-Administered Medications   Medication Dose Route Frequency Provider Last Rate Last Dose   • Respiratory Care per Protocol   Nebulization Continuous RT Mike Barragan M.D.       • Pharmacy Consult Request ...Pain Management Review 1 Each  1 Each Other PHARMACY TO DOSE Mike Barragan M.D.       • docusate sodium (COLACE) capsule 100 mg  100 mg Oral BID Mike Barragan M.D.       • senna-docusate (PERICOLACE or SENOKOT S) 8.6-50 MG per tablet 1 Tab  1 Tab Oral Nightly Mike Barragan M.D.       • senna-docusate (PERICOLACE or SENOKOT S) 8.6-50 MG per tablet 1 Tab  1 Tab Oral Q24HRS PRN Mike Barragan M.D.       • polyethylene glycol/lytes (MIRALAX) PACKET 1 Packet  1 Packet  Oral BID Mike Barragan M.D.       • magnesium hydroxide (MILK OF MAGNESIA) suspension 30 mL  30 mL Oral DAILY Mike Barragan M.D.       • bisacodyl (DULCOLAX) suppository 10 mg  10 mg Rectal Q24HRS PRN Mike Barragan M.D.       • fleet enema 133 mL  1 Each Rectal Once PRN Mike Barragan M.D.       • LR infusion   Intravenous Continuous Mike Barragan M.D. 100 mL/hr at 02/27/19 1909     • [START ON 2/28/2019] enoxaparin (LOVENOX) inj 30 mg  30 mg Subcutaneous Q12HRS Mike Barragan M.D.       • acetaminophen (TYLENOL) tablet 1,000 mg  1,000 mg Oral Q6HRS Mike Barragan M.D.   1,000 mg at 02/27/19 1816   • ketorolac (TORADOL) injection 30 mg  30 mg Intravenous Q6HRS Mike Barragan M.D.   30 mg at 02/27/19 1816   • fentaNYL (SUBLIMAZE) injection 50 mcg  50 mcg Intravenous Q3HRS PRN Mike Barragan M.D.   100 mcg at 02/27/19 1740   • fentaNYL (SUBLIMAZE) 50 mcg/mL in 50 mL (PCA)   Intravenous Continuous Mike Barragan M.D.       • famotidine (PEPCID) tablet 20 mg  20 mg Oral BID Mike Barragan M.D.        Or   • famotidine (PEPCID) injection 20 mg  20 mg Intravenous BID Mike Barragan M.D.   20 mg at 02/27/19 1821   • ondansetron (ZOFRAN) syringe/vial injection 4 mg  4 mg Intravenous Q4HRS PRN Mike Barragan M.D.       • gabapentin (NEURONTIN) capsule 100 mg  100 mg Oral TID Mike Barragan M.D.   100 mg at 02/27/19 1816   • lidocaine (LIDODERM) 5 % 2 Patch  2 Patch Transdermal Q24HR Mike Barragan M.D.   2 Patch at 02/27/19 1816       Social History     Social History   • Marital status: N/A     Spouse name: N/A   • Number of children: N/A   • Years of education: N/A     Occupational History   • Not on file.     Social History Main Topics   • Smoking status: Not on file   • Smokeless tobacco: Not on file   • Alcohol use Not on file   • Drug use: Unknown   • Sexual activity: Not on file     Other Topics Concern   • Not on file  "    Social History Narrative   • No narrative on file       No family history on file.    Allergies:  Patient has no known allergies.    Review of Systems:  Constitutional: Negative for fever, chills, weight loss, malaise/fatigue and diaphoresis.   HENT: Negative for hearing loss, ear pain, nosebleeds, congestion, sore throat, neck pain, and ear discharge.    Eyes: Negative for blurred vision, double vision, and redness.   Respiratory: Negative for cough, sputum production, wheezing and stridor.  Positive for left chest wall pain, and shortness of breath .  Cardiovascular: Negative for chest pain, palpitations.   Gastrointestinal: Negative for heartburn, nausea, vomiting, abdominal pain, diarrhea, constipation.  Genitourinary: Negative for dysuria, urgency, frequency.   Musculoskeletal: Negative for myalgias, back pain, joint pain and falls.   Skin: Negative for itching and rash.  Neurological: Negative for dizziness, loss of consciousness, weakness and headaches.   Endo/Heme/Allergies: Negative for environmental allergies. Does not bruise/bleed easily.   Psychiatric/Behavioral: Negative for depression and substance abuse. The patient is not nervous/anxious.    Physical Exam:  Blood pressure 140/72, pulse 88, temperature 36.7 °C (98 °F), temperature source Temporal, resp. rate (!) 28, height 1.854 m (6' 1\"), weight 100.2 kg (220 lb 14.4 oz), SpO2 98 %.    Constitutional: Awake, alert, oriented x3. No acute distress. GCS 15. E4 V5 M6.  Head: No cephalohematoma. Pupils are 2 mm,  reactive bilaterally. Midface stable. No malocclusion.  TMs clear bilaterally. No drainage from the mouth or nose.  Neck: No tracheal deviation. No midline cervical spine tenderness.  Cardiovascular: Normal rate, regular rhythm, normal heart sounds and intact distal pulses.  Exam reveals no gallop and no friction rub.  No murmur heard.  Pulmonary/Chest: Clavicles nontender to palpation. There is left chest wall tenderness.  Left anterior " flail segment with paradoxical movement. There is tenderness over the lower sternum.  No crepitus. Positive breath sounds bilaterally.   Abdominal: Soft, nondistended. Nontender to palpation. Pelvis is stable to anterior-posterior compression.   Musculoskeletal: Right upper extremity grossly atraumatic, palpable radial pulse. 5/5  strength. Full ROM and strength at elbow.  Left upper extremity grossly atraumatic, palpable radial pulse. 5/5  strength. Full ROM and strength at elbow.  Right lower extremity grossly atraumatic. 5/5 strength in ankle plantar flexion and dorsiflexion. No pain and full ROM at right knee and hip.   Left  lower extremity grossly atraumatic. 5/5 strength in ankle plantar flexion and dorsiflexion. No pain and full ROM at left knee and hip.   Back: Midline thoracic and lumbar spines are nontender to palpation. No step-offs.   : Normal male external genitalia. Rectal exam not done. No blood visible at urethral meatus.   Neurological: Sensation intact to light touch dorsum and plantar surfaces of both feet and the medial and lateral aspects of both lower legs.  Sensation intact to light touch dorsum and plantar surfaces of both hands.   Skin: Skin is warm and dry.  No diaphoresis. No erythema. No pallor.     Labs:  Recent Labs      02/27/19   1641  02/27/19   1645   WBC  13.3*   --    RBC  4.50*   --    HEMOGLOBIN  14.6  14.7   HEMATOCRIT  42.7   --    MCV  94.9   --    MCH  32.4   --    MCHC  34.2   --    RDW  45.4   --    PLATELETCT  240   --    MPV  8.7*   --      Recent Labs      02/27/19   1641   SODIUM  135   POTASSIUM  3.9   CHLORIDE  103   CO2  24   GLUCOSE  150*   BUN  14   CREATININE  0.87   CALCIUM  8.4*     Recent Labs      02/27/19   1641   APTT  26.9   INR  1.09     Recent Labs      02/27/19   1641   ASTSGOT  39   ALTSGPT  30   TBILIRUBIN  1.0   ALKPHOSPHAT  61   GLOBULIN  2.4   INR  1.09       Radiology:  DX-CHEST-LIMITED (1 VIEW)   Final Result         Placement of  left-sided chest tube without obvious pneumothorax on chest x-ray.      Left lung consolidation consistent with pulmonary contusion.      Multiple left-sided rib fractures.      Subcutaneous emphysema.      CT-TSPINE W/O PLUS RECONS   Final Result         No acute fracture or subluxation of the thoracic spine.      CT-CHEST,ABDOMEN,PELVIS WITH   Final Result      Left-sided rib fractures involving all ribs with the exception of the 12th rib. Some rib fractures are segmental as described above with disruption of the costochondral cartilage.      Small left-sided hemopneumothorax and pneumomediastinum with subcutaneous emphysema.      Left lung pulmonary contusion.      Apparent nondisplaced inferior sternal fracture.      No evidence of abdominal solid organ injury or free fluid.      This was discussed with Dr. Franks at 5:35 PM.      CT-LSPINE W/O PLUS RECONS   Final Result      NO ACUTE FRACTURE OR DISLOCATION IS PRESENT IN THE LUMBAR SPINE.      CT-CSPINE WITHOUT PLUS RECONS   Final Result      Degenerative change without evidence of cervical spine fracture.      CT-HEAD W/O   Final Result      1.  White matter lucencies most consistent with small vessel ischemic change versus demyelination or gliosis.      2.  Otherwise, Head CT without contrast with no acute findings. No evidence of acute cerebral infarction, hemorrhage or mass lesion.      DX-CHEST-PORTABLE (1 VIEW)   Final Result      Multiple left-sided rib fractures with subcutaneous emphysema.      No gross evidence of pneumothorax on supine radiograph with significant artifact from backboard.      Left midlung consolidation.      Prominent mediastinum.      DX-PELVIS-1 OR 2 VIEWS   Final Result      No evidence of pelvic or proximal femoral fracture.            Assessment: This is a 58 y.o male with left flail chest, left hemopneumothorax and pulmonary contusion    Plan:     Admit to ICU  Left chest tube insertion with conscious sedation  Aggressive  pulmonary toilet  Multimodal pain management  Fentanyl PCA    Trauma  15 foot fall on snow.  Trauma Red Activation.   Mike Barragan MD. Trauma Surgery.    Traumatic pneumothorax and hemothorax  Left hemopneumothroax on CT  CT placed on arrival to ICU  Aggressive multimodal pain management and pulmonary hygiene.   Serial chest radiographs.    Chronic pain  Daily morphine for pain.  15mg bid MS Contin.  10 mg Oxycontin tid.    Left pulmonary contusion  O2 to keep sat > 93%  Aggressive multimodal pain management and pulmonary hygiene.   Serial chest radiographs.      Closed fracture of multiple ribs with flail chest  Anterior lateral left 1, 2, 3, 4, 5, 6, 7 rib fractures.   Left posterior 6th - 12th  rib fractures.   Disruption of the costochondral cartilage involving the left second, third, fourth, fifth, sixth ribs  Flail anterior lateral chest  Aggressive multimodal pain management and pulmonary hygiene.   Serial chest radiographs.  Dr. SHAVONNE Barragan Trauma    Closed fracture of sternum  Nondisplaced inferior sternal fracture  No retrosternal hematoma  Aggressive multimodal pain management and pulmonary hygiene.   Serial chest radiographs.    H/O clavicle fracture  Old Left clavicle seen on CT chest.     No contraindication to anticoagulation therapy  2/27 Lovenox initiated.       Time spent: Trauma / Critical Care Time 85 minutes excluding procedures.    Mike Barragan MD  [facility]  [phone]

## 2019-02-28 NOTE — ASSESSMENT & PLAN NOTE
Left hemopneumothroax on CT.  Chest tube placed on arrival to ICU.  3/2 CT chest with retained hemothorax.  3/3 Thoracoscopy with evacuation of retained hemothorax.  3/6 To water seal  3/9 Chest tube removed

## 2019-02-28 NOTE — ASSESSMENT & PLAN NOTE
Supplemental oxygen to maintain SaO2 greater than 93%.  Aggressive pulmonary hygiene and serial chest radiography.

## 2019-02-28 NOTE — ASSESSMENT & PLAN NOTE
Anterior lateral left 1, 2, 3, 4, 5, 6, 7 rib fractures. Left posterior 6th - 12th  rib fractures. Disruption of the costochondral cartilage involving the left second, third, fourth, fifth, sixth ribs. Flail anterior lateral chest.  Aggressive pulmonary hygiene and multimodal pain management.

## 2019-02-28 NOTE — PROGRESS NOTES
Dr. Barragan bedside for L. chest tube placement. Consents signed and placed in chart    Timeout at 17:38, all agreed  Medications given at 17:40  Procedure start time at 17:41  Procedure end time at 17:50    Pt tolerated procedure well. See Flowsheets

## 2019-02-28 NOTE — PROGRESS NOTES
Trauma / Surgical Daily Progress Note    Date of Service  2/28/2019    Interval Events  New admit  Severe blunt chest trauma  High risk for sudden respiratory decompensation    Review of Systems     Vital Signs for last 24 hours  Temp:  [36.3 °C (97.3 °F)-37.3 °C (99.1 °F)] 37.3 °C (99.1 °F)  Pulse:  [60-97] 67  Resp:  [0-46] 14  BP: (125-166)/() 140/72  SpO2:  [91 %-100 %] 99 %    Physical Exam  Physical Exam   Constitutional: Vital signs are normal. He appears well-developed and well-nourished.  Non-toxic appearance. He does not have a sickly appearance. Nasal cannula in place.   HENT:   Head: Normocephalic and atraumatic.   Eyes: Pupils are equal, round, and reactive to light. Conjunctivae and EOM are normal.   Cardiovascular: Normal rate, regular rhythm, intact distal pulses and normal pulses.    Pulmonary/Chest: He has decreased breath sounds in the left middle field and the left lower field.   IS 2000  Right chest tube with serosang output, no airleak   Abdominal: Soft. Normal appearance. There is no tenderness.   Genitourinary: Testes normal and penis normal.   Musculoskeletal: Normal range of motion.   Neurological: He is alert. No cranial nerve deficit or sensory deficit. GCS eye subscore is 4. GCS verbal subscore is 5. GCS motor subscore is 6.   Skin: Skin is warm, dry and intact.   Nursing note and vitals reviewed.      Laboratory  Recent Results (from the past 24 hour(s))   DIAGNOSTIC ALCOHOL    Collection Time: 02/27/19  4:41 PM   Result Value Ref Range    Diagnostic Alcohol 0.00 0.00 g/dL   CBC WITHOUT DIFFERENTIAL    Collection Time: 02/27/19  4:41 PM   Result Value Ref Range    WBC 13.3 (H) 4.8 - 10.8 K/uL    RBC 4.50 (L) 4.70 - 6.10 M/uL    Hemoglobin 14.6 14.0 - 18.0 g/dL    Hematocrit 42.7 42.0 - 52.0 %    MCV 94.9 81.4 - 97.8 fL    MCH 32.4 27.0 - 33.0 pg    MCHC 34.2 33.7 - 35.3 g/dL    RDW 45.4 35.9 - 50.0 fL    Platelet Count 240 164 - 446 K/uL    MPV 8.7 (L) 9.0 - 12.9 fL   Comp  Metabolic Panel    Collection Time: 02/27/19  4:41 PM   Result Value Ref Range    Sodium 135 135 - 145 mmol/L    Potassium 3.9 3.6 - 5.5 mmol/L    Chloride 103 96 - 112 mmol/L    Co2 24 20 - 33 mmol/L    Anion Gap 8.0 0.0 - 11.9    Glucose 150 (H) 65 - 99 mg/dL    Bun 14 8 - 22 mg/dL    Creatinine 0.87 0.50 - 1.40 mg/dL    Calcium 8.4 (L) 8.5 - 10.5 mg/dL    AST(SGOT) 39 12 - 45 U/L    ALT(SGPT) 30 2 - 50 U/L    Alkaline Phosphatase 61 30 - 99 U/L    Total Bilirubin 1.0 0.1 - 1.5 mg/dL    Albumin 4.1 3.2 - 4.9 g/dL    Total Protein 6.5 6.0 - 8.2 g/dL    Globulin 2.4 1.9 - 3.5 g/dL    A-G Ratio 1.7 g/dL   Prothrombin Time    Collection Time: 02/27/19  4:41 PM   Result Value Ref Range    PT 14.2 12.0 - 14.6 sec    INR 1.09 0.87 - 1.13   APTT    Collection Time: 02/27/19  4:41 PM   Result Value Ref Range    APTT 26.9 24.7 - 36.0 sec   PLATELET MAPPING WITH BASIC TEG    Collection Time: 02/27/19  4:41 PM   Result Value Ref Range    Reaction Time Initial-R 4.1 (L) 5.0 - 10.0 min    Clot Kinetics-K 1.4 1.0 - 3.0 min    Clot Angle-Angle 70.0 53.0 - 72.0 degrees    Maximum Clot Strength-MA 63.1 50.0 - 70.0 mm    Lysis 30 minutes-LY30 0.0 0.0 - 8.0 %    % Inhibition ADP 16.4 %    % Inhibition AA 5.6 %    TEG Algorithm Link Algorithm    COD - Adult (Type and Screen)    Collection Time: 02/27/19  4:41 PM   Result Value Ref Range    ABO Grouping Only O     Rh Grouping Only POS     Antibody Screen-Cod NEG    ESTIMATED GFR    Collection Time: 02/27/19  4:41 PM   Result Value Ref Range    GFR If African American >60 >60 mL/min/1.73 m 2    GFR If Non African American >60 >60 mL/min/1.73 m 2   Hemoglobin - STAT    Collection Time: 02/27/19  4:45 PM   Result Value Ref Range    Hemoglobin 14.7 14.0 - 18.0 g/dL   ABO AND RH CONFIRMATION    Collection Time: 02/27/19  7:03 PM   Result Value Ref Range    ABO Confirm O     Second Rh Group POS    Hemoglobin - Q6 hours x4    Collection Time: 02/27/19  8:30 PM   Result Value Ref Range     Hemoglobin 12.2 (L) 14.0 - 18.0 g/dL   CBC with Differential: Tomorrow AM    Collection Time: 02/28/19  1:54 AM   Result Value Ref Range    WBC 5.9 4.8 - 10.8 K/uL    RBC 3.46 (L) 4.70 - 6.10 M/uL    Hemoglobin 11.2 (L) 14.0 - 18.0 g/dL    Hematocrit 32.7 (L) 42.0 - 52.0 %    MCV 94.5 81.4 - 97.8 fL    MCH 32.4 27.0 - 33.0 pg    MCHC 34.3 33.7 - 35.3 g/dL    RDW 46.0 35.9 - 50.0 fL    Platelet Count 148 (L) 164 - 446 K/uL    MPV 8.6 (L) 9.0 - 12.9 fL    Neutrophils-Polys 74.90 (H) 44.00 - 72.00 %    Lymphocytes 14.00 (L) 22.00 - 41.00 %    Monocytes 10.30 0.00 - 13.40 %    Eosinophils 0.20 0.00 - 6.90 %    Basophils 0.30 0.00 - 1.80 %    Immature Granulocytes 0.30 0.00 - 0.90 %    Nucleated RBC 0.00 /100 WBC    Neutrophils (Absolute) 4.43 1.82 - 7.42 K/uL    Lymphs (Absolute) 0.83 (L) 1.00 - 4.80 K/uL    Monos (Absolute) 0.61 0.00 - 0.85 K/uL    Eos (Absolute) 0.01 0.00 - 0.51 K/uL    Baso (Absolute) 0.02 0.00 - 0.12 K/uL    Immature Granulocytes (abs) 0.02 0.00 - 0.11 K/uL    NRBC (Absolute) 0.00 K/uL   Comp Metabolic Panel (CMP): Tomorrow AM    Collection Time: 02/28/19  4:09 AM   Result Value Ref Range    Sodium 133 (L) 135 - 145 mmol/L    Potassium 3.7 3.6 - 5.5 mmol/L    Chloride 103 96 - 112 mmol/L    Co2 23 20 - 33 mmol/L    Anion Gap 7.0 0.0 - 11.9    Glucose 110 (H) 65 - 99 mg/dL    Bun 12 8 - 22 mg/dL    Creatinine 0.75 0.50 - 1.40 mg/dL    Calcium 7.8 (L) 8.5 - 10.5 mg/dL    AST(SGOT) 50 (H) 12 - 45 U/L    ALT(SGPT) 28 2 - 50 U/L    Alkaline Phosphatase 47 30 - 99 U/L    Total Bilirubin 1.8 (H) 0.1 - 1.5 mg/dL    Albumin 3.3 3.2 - 4.9 g/dL    Total Protein 5.4 (L) 6.0 - 8.2 g/dL    Globulin 2.1 1.9 - 3.5 g/dL    A-G Ratio 1.6 g/dL   MAGNESIUM    Collection Time: 02/28/19  4:09 AM   Result Value Ref Range    Magnesium 1.9 1.5 - 2.5 mg/dL   PHOSPHORUS    Collection Time: 02/28/19  4:09 AM   Result Value Ref Range    Phosphorus 4.1 2.5 - 4.5 mg/dL   ESTIMATED GFR    Collection Time: 02/28/19  4:09 AM    Result Value Ref Range    GFR If African American >60 >60 mL/min/1.73 m 2    GFR If Non African American >60 >60 mL/min/1.73 m 2   Hemoglobin - Q6 hours x4    Collection Time: 02/28/19  7:55 AM   Result Value Ref Range    Hemoglobin 10.0 (L) 14.0 - 18.0 g/dL       Fluids    Intake/Output Summary (Last 24 hours) at 02/28/19 1216  Last data filed at 02/28/19 0600   Gross per 24 hour   Intake           1871.2 ml   Output             1320 ml   Net            551.2 ml       Core Measures & Quality Metrics  Labs reviewed, Medications reviewed and Radiology images reviewed  Robbins catheter: No Robbins      DVT Prophylaxis: Enoxaparin (Lovenox)  DVT prophylaxis - mechanical: SCDs  Ulcer prophylaxis: Not indicated        MAMI Score  ETOH Screening    Assessment/Plan  Closed fracture of sternum- (present on admission)   Assessment & Plan    Nondisplaced inferior sternal fracture  No retrosternal hematoma  Aggressive multimodal pain management and pulmonary hygiene.   Serial chest radiographs.      Left pulmonary contusion- (present on admission)   Assessment & Plan    O2 to keep sat > 93%  Aggressive multimodal pain management and pulmonary hygiene.   Serial chest radiographs.      Traumatic pneumothorax and hemothorax- (present on admission)   Assessment & Plan    Left hemopneumothroax on CT  CT placed on arrival to ICU  Serial chest radiographs.     Closed fracture of multiple ribs with flail chest- (present on admission)   Assessment & Plan    Anterior lateral left 1, 2, 3, 4, 5, 6, 7 rib fractures.   Left posterior 6th - 12th  rib fractures.   Disruption of the costochondral cartilage involving the left second, third, fourth, fifth, sixth ribs  Flail anterior lateral chest  Aggressive multimodal pain management and pulmonary hygiene.   Serial chest radiographs.     Chronic pain- (present on admission)   Assessment & Plan    Daily meds for pain.  MS Contin 15mg BID  Oxy IR 15 mg TID  Resumed as part of the multi-modal  regimen     No contraindication to anticoagulation therapy- (present on admission)   Assessment & Plan    2/27 Pharmacological DVT prophylaxis, Lovenox initiated.      H/O clavicle fracture- (present on admission)   Assessment & Plan    Old Left clavicle seen on CT chest.      Trauma- (present on admission)   Assessment & Plan    15 foot fall from roof onto ice chunks.  Trauma Red Activation.   Mike Barragan MD. Trauma Surgery.        I independently reviewed pertinent clinical lab tests since admission and ordered additional follow up clinical lab tests.  I independently reviewed pertinent radiographic images and the radiologist's reports since admission and ordered additional follow up radiographic studies.  I reviewed the details of the available patient records and documentation by consulting physicians in EPIC up to today, summated the information, and utilized the information as warranted in today's medical decision making for this patient.  I personally evaluated the patient condition at bedside and discussed the daily plan(s) with available nursing staff, dieticians, social workers, pharmacists on rounds.    Severe blunt chest trauma with multiple rib fractures requiring parenteral controlled substances involving high complexity decision making initiated in an urgent manner by assessing, manipulating, and supporting pulmonary function given the high probability of further deterioration in the patient's condition and threat to life.    Aggregated critical care time spent evaluating, reviewing documentation, providing care, and managing this patient exclusive of procedures: 45 minutes    Bebeto Kilpatrick MD

## 2019-02-28 NOTE — PROGRESS NOTES
Med Rec Updated PARTIALLY per Pt at bedside  Allergies Reviewed  No PO ABX last 30 days    Pt reports he is on Morphine for pain management. Pt knows names of medications but not doses.    Tech to follow up with Home Pharmacy in AM.

## 2019-03-01 ENCOUNTER — APPOINTMENT (OUTPATIENT)
Dept: RADIOLOGY | Facility: MEDICAL CENTER | Age: 59
DRG: 166 | End: 2019-03-01
Attending: SURGERY
Payer: COMMERCIAL

## 2019-03-01 ENCOUNTER — APPOINTMENT (OUTPATIENT)
Dept: RADIOLOGY | Facility: MEDICAL CENTER | Age: 59
DRG: 166 | End: 2019-03-01
Attending: NURSE PRACTITIONER
Payer: COMMERCIAL

## 2019-03-01 PROBLEM — Z02.9 DISCHARGE PLANNING ISSUES: Status: ACTIVE | Noted: 2019-03-01

## 2019-03-01 LAB
ANION GAP SERPL CALC-SCNC: 8 MMOL/L (ref 0–11.9)
BASOPHILS # BLD AUTO: 0.6 % (ref 0–1.8)
BASOPHILS # BLD: 0.04 K/UL (ref 0–0.12)
BUN SERPL-MCNC: 12 MG/DL (ref 8–22)
CALCIUM SERPL-MCNC: 8.6 MG/DL (ref 8.5–10.5)
CHLORIDE SERPL-SCNC: 101 MMOL/L (ref 96–112)
CO2 SERPL-SCNC: 24 MMOL/L (ref 20–33)
CREAT SERPL-MCNC: 0.88 MG/DL (ref 0.5–1.4)
EOSINOPHIL # BLD AUTO: 0.09 K/UL (ref 0–0.51)
EOSINOPHIL NFR BLD: 1.4 % (ref 0–6.9)
ERYTHROCYTE [DISTWIDTH] IN BLOOD BY AUTOMATED COUNT: 47.7 FL (ref 35.9–50)
GLUCOSE SERPL-MCNC: 102 MG/DL (ref 65–99)
HCT VFR BLD AUTO: 34.6 % (ref 42–52)
HGB BLD-MCNC: 11.6 G/DL (ref 14–18)
IMM GRANULOCYTES # BLD AUTO: 0.03 K/UL (ref 0–0.11)
IMM GRANULOCYTES NFR BLD AUTO: 0.5 % (ref 0–0.9)
IRON SATN MFR SERPL: 14 % (ref 15–55)
IRON SERPL-MCNC: 34 UG/DL (ref 50–180)
LYMPHOCYTES # BLD AUTO: 0.92 K/UL (ref 1–4.8)
LYMPHOCYTES NFR BLD: 14.4 % (ref 22–41)
MCH RBC QN AUTO: 32.6 PG (ref 27–33)
MCHC RBC AUTO-ENTMCNC: 33.5 G/DL (ref 33.7–35.3)
MCV RBC AUTO: 97.2 FL (ref 81.4–97.8)
MONOCYTES # BLD AUTO: 0.63 K/UL (ref 0–0.85)
MONOCYTES NFR BLD AUTO: 9.9 % (ref 0–13.4)
NEUTROPHILS # BLD AUTO: 4.66 K/UL (ref 1.82–7.42)
NEUTROPHILS NFR BLD: 73.2 % (ref 44–72)
NRBC # BLD AUTO: 0 K/UL
NRBC BLD-RTO: 0 /100 WBC
PLATELET # BLD AUTO: 139 K/UL (ref 164–446)
PMV BLD AUTO: 9.2 FL (ref 9–12.9)
POTASSIUM SERPL-SCNC: 4 MMOL/L (ref 3.6–5.5)
RBC # BLD AUTO: 3.56 M/UL (ref 4.7–6.1)
SODIUM SERPL-SCNC: 133 MMOL/L (ref 135–145)
TIBC SERPL-MCNC: 248 UG/DL (ref 250–450)
WBC # BLD AUTO: 6.4 K/UL (ref 4.8–10.8)

## 2019-03-01 PROCEDURE — 94760 N-INVAS EAR/PLS OXIMETRY 1: CPT

## 2019-03-01 PROCEDURE — 85025 COMPLETE CBC W/AUTO DIFF WBC: CPT

## 2019-03-01 PROCEDURE — 99233 SBSQ HOSP IP/OBS HIGH 50: CPT | Performed by: SURGERY

## 2019-03-01 PROCEDURE — 94669 MECHANICAL CHEST WALL OSCILL: CPT

## 2019-03-01 PROCEDURE — 71045 X-RAY EXAM CHEST 1 VIEW: CPT

## 2019-03-01 PROCEDURE — 700102 HCHG RX REV CODE 250 W/ 637 OVERRIDE(OP): Performed by: SURGERY

## 2019-03-01 PROCEDURE — A9270 NON-COVERED ITEM OR SERVICE: HCPCS | Performed by: SURGERY

## 2019-03-01 PROCEDURE — 700111 HCHG RX REV CODE 636 W/ 250 OVERRIDE (IP): Performed by: SURGERY

## 2019-03-01 PROCEDURE — 80048 BASIC METABOLIC PNL TOTAL CA: CPT

## 2019-03-01 PROCEDURE — A9270 NON-COVERED ITEM OR SERVICE: HCPCS | Performed by: NURSE PRACTITIONER

## 2019-03-01 PROCEDURE — 83540 ASSAY OF IRON: CPT

## 2019-03-01 PROCEDURE — 700102 HCHG RX REV CODE 250 W/ 637 OVERRIDE(OP): Performed by: NURSE PRACTITIONER

## 2019-03-01 PROCEDURE — 83550 IRON BINDING TEST: CPT

## 2019-03-01 PROCEDURE — 700112 HCHG RX REV CODE 229: Performed by: SURGERY

## 2019-03-01 PROCEDURE — 700101 HCHG RX REV CODE 250: Performed by: SURGERY

## 2019-03-01 PROCEDURE — 94668 MNPJ CHEST WALL SBSQ: CPT

## 2019-03-01 PROCEDURE — 770001 HCHG ROOM/CARE - MED/SURG/GYN PRIV*

## 2019-03-01 RX ORDER — MORPHINE SULFATE 15 MG/1
15 TABLET, FILM COATED, EXTENDED RELEASE ORAL 3 TIMES DAILY
Status: DISCONTINUED | OUTPATIENT
Start: 2019-03-01 | End: 2019-03-02

## 2019-03-01 RX ORDER — DIAZEPAM 2 MG/1
5 TABLET ORAL EVERY 4 HOURS PRN
Status: DISCONTINUED | OUTPATIENT
Start: 2019-03-01 | End: 2019-03-12 | Stop reason: HOSPADM

## 2019-03-01 RX ORDER — HYDROMORPHONE HYDROCHLORIDE 2 MG/ML
1 INJECTION, SOLUTION INTRAMUSCULAR; INTRAVENOUS; SUBCUTANEOUS
Status: DISCONTINUED | OUTPATIENT
Start: 2019-03-01 | End: 2019-03-01

## 2019-03-01 RX ADMIN — OXYCODONE HYDROCHLORIDE 15 MG: 5 TABLET ORAL at 11:10

## 2019-03-01 RX ADMIN — KETOROLAC TROMETHAMINE 30 MG: 30 INJECTION, SOLUTION INTRAMUSCULAR; INTRAVENOUS at 05:05

## 2019-03-01 RX ADMIN — ENOXAPARIN SODIUM 30 MG: 100 INJECTION SUBCUTANEOUS at 05:05

## 2019-03-01 RX ADMIN — ACETAMINOPHEN 1000 MG: 500 TABLET ORAL at 18:22

## 2019-03-01 RX ADMIN — HYDROMORPHONE HYDROCHLORIDE 1 MG: 2 INJECTION INTRAMUSCULAR; INTRAVENOUS; SUBCUTANEOUS at 15:33

## 2019-03-01 RX ADMIN — POLYETHYLENE GLYCOL 3350 1 PACKET: 17 POWDER, FOR SOLUTION ORAL at 18:23

## 2019-03-01 RX ADMIN — HYDROMORPHONE HYDROCHLORIDE 1 MG: 2 INJECTION INTRAMUSCULAR; INTRAVENOUS; SUBCUTANEOUS at 20:28

## 2019-03-01 RX ADMIN — KETOROLAC TROMETHAMINE 30 MG: 30 INJECTION, SOLUTION INTRAMUSCULAR; INTRAVENOUS at 11:10

## 2019-03-01 RX ADMIN — OXYCODONE HYDROCHLORIDE 15 MG: 5 TABLET ORAL at 23:59

## 2019-03-01 RX ADMIN — ENOXAPARIN SODIUM 30 MG: 100 INJECTION SUBCUTANEOUS at 18:23

## 2019-03-01 RX ADMIN — KETOROLAC TROMETHAMINE 30 MG: 30 INJECTION, SOLUTION INTRAMUSCULAR; INTRAVENOUS at 18:23

## 2019-03-01 RX ADMIN — OXYCODONE HYDROCHLORIDE 15 MG: 5 TABLET ORAL at 15:32

## 2019-03-01 RX ADMIN — HYDROMORPHONE HYDROCHLORIDE 1 MG: 2 INJECTION INTRAMUSCULAR; INTRAVENOUS; SUBCUTANEOUS at 04:03

## 2019-03-01 RX ADMIN — GABAPENTIN 300 MG: 300 CAPSULE ORAL at 05:05

## 2019-03-01 RX ADMIN — DOCUSATE SODIUM 100 MG: 100 CAPSULE, LIQUID FILLED ORAL at 18:22

## 2019-03-01 RX ADMIN — OXYCODONE HYDROCHLORIDE 15 MG: 5 TABLET ORAL at 20:32

## 2019-03-01 RX ADMIN — SENNOSIDES AND DOCUSATE SODIUM 1 TABLET: 8.6; 5 TABLET ORAL at 20:56

## 2019-03-01 RX ADMIN — OXYCODONE HYDROCHLORIDE 15 MG: 5 TABLET ORAL at 03:08

## 2019-03-01 RX ADMIN — OXYCODONE HYDROCHLORIDE 15 MG: 5 TABLET ORAL at 07:58

## 2019-03-01 RX ADMIN — MORPHINE SULFATE 15 MG: 15 TABLET, EXTENDED RELEASE ORAL at 18:22

## 2019-03-01 RX ADMIN — DIAZEPAM 5 MG: 2 TABLET ORAL at 11:09

## 2019-03-01 RX ADMIN — MORPHINE SULFATE 15 MG: 15 TABLET, EXTENDED RELEASE ORAL at 05:05

## 2019-03-01 RX ADMIN — FENTANYL CITRATE 50 MCG: 50 INJECTION INTRAMUSCULAR; INTRAVENOUS at 07:59

## 2019-03-01 RX ADMIN — ACETAMINOPHEN 1000 MG: 500 TABLET ORAL at 13:09

## 2019-03-01 RX ADMIN — DOCUSATE SODIUM 100 MG: 100 CAPSULE, LIQUID FILLED ORAL at 05:05

## 2019-03-01 RX ADMIN — ACETAMINOPHEN 1000 MG: 500 TABLET ORAL at 05:05

## 2019-03-01 RX ADMIN — HYDROMORPHONE HYDROCHLORIDE 1 MG: 2 INJECTION INTRAMUSCULAR; INTRAVENOUS; SUBCUTANEOUS at 23:59

## 2019-03-01 RX ADMIN — MORPHINE SULFATE 15 MG: 15 TABLET, EXTENDED RELEASE ORAL at 13:09

## 2019-03-01 RX ADMIN — LIDOCAINE 2 PATCH: 50 PATCH TOPICAL at 18:22

## 2019-03-01 RX ADMIN — DIAZEPAM 5 MG: 2 TABLET ORAL at 21:34

## 2019-03-01 RX ADMIN — HYDROMORPHONE HYDROCHLORIDE 1 MG: 2 INJECTION INTRAMUSCULAR; INTRAVENOUS; SUBCUTANEOUS at 10:04

## 2019-03-01 RX ADMIN — DIAZEPAM 5 MG: 2 TABLET ORAL at 05:27

## 2019-03-01 RX ADMIN — POLYETHYLENE GLYCOL 3350 1 PACKET: 17 POWDER, FOR SOLUTION ORAL at 05:05

## 2019-03-01 RX ADMIN — GABAPENTIN 300 MG: 300 CAPSULE ORAL at 18:24

## 2019-03-01 RX ADMIN — GABAPENTIN 300 MG: 300 CAPSULE ORAL at 11:10

## 2019-03-01 ASSESSMENT — COGNITIVE AND FUNCTIONAL STATUS - GENERAL
MOBILITY SCORE: 21
DAILY ACTIVITIY SCORE: 24
STANDING UP FROM CHAIR USING ARMS: A LITTLE
WALKING IN HOSPITAL ROOM: A LITTLE
CLIMB 3 TO 5 STEPS WITH RAILING: A LITTLE
SUGGESTED CMS G CODE MODIFIER MOBILITY: CJ
SUGGESTED CMS G CODE MODIFIER DAILY ACTIVITY: CH

## 2019-03-01 ASSESSMENT — ENCOUNTER SYMPTOMS
SHORTNESS OF BREATH: 1
MYALGIAS: 1
FEVER: 0
ABDOMINAL PAIN: 0
FOCAL WEAKNESS: 0
ROS GI COMMENTS: BM PRIOR TO ARRIVAL
VOMITING: 0
CHILLS: 0
NAUSEA: 0
DOUBLE VISION: 0

## 2019-03-01 ASSESSMENT — LIFESTYLE VARIABLES
EVER FELT BAD OR GUILTY ABOUT YOUR DRINKING: YES
EVER_SMOKED: NEVER
TOTAL SCORE: 1
HOW MANY TIMES IN THE PAST YEAR HAVE YOU HAD 5 OR MORE DRINKS IN A DAY: 10
AVERAGE NUMBER OF DAYS PER WEEK YOU HAVE A DRINK CONTAINING ALCOHOL: 7
CONSUMPTION TOTAL: POSITIVE
TOTAL SCORE: 1
TOTAL SCORE: 1
ALCOHOL_USE: YES
ON A TYPICAL DAY WHEN YOU DRINK ALCOHOL HOW MANY DRINKS DO YOU HAVE: 2
HAVE YOU EVER FELT YOU SHOULD CUT DOWN ON YOUR DRINKING: NO
HAVE PEOPLE ANNOYED YOU BY CRITICIZING YOUR DRINKING: NO
EVER HAD A DRINK FIRST THING IN THE MORNING TO STEADY YOUR NERVES TO GET RID OF A HANGOVER: NO

## 2019-03-01 NOTE — PROGRESS NOTES
Trauma / Surgical Daily Progress Note    Date of Service  3/1/2019    Chief Complaint  58 y.o. male admitted 2/27/2019 with Trauma    Interval Events    Adequate pain control   IS 2500 cc  Stable oxygen requirements  3/1 CXR pending   Tertiary exam completed     - Continue aggressive pulmonary hygiene and multimodal pain management  - Continue chest tube to suction  - Trend  CXR imaging   - Clinically stable at this time  - Transfer to [facility] pending review of CXR imaging   - Counseled       Review of Systems  Review of Systems   Constitutional: Negative for chills and fever.   HENT: Negative for hearing loss.    Eyes: Negative for double vision.   Respiratory: Positive for shortness of breath.    Cardiovascular: Negative for chest pain.   Gastrointestinal: Negative for abdominal pain, nausea and vomiting.        BM prior to arrival    Musculoskeletal: Positive for myalgias.        History of chronic pain    Neurological: Negative for focal weakness.        Vital Signs  Temp:  [36.7 °C (98 °F)-37.1 °C (98.7 °F)] 37.1 °C (98.7 °F)  Pulse:  [58-88] 58  Resp:  [0-28] 23  SpO2:  [90 %-98 %] 95 %    Physical Exam  Physical Exam   Constitutional: He appears well-developed and well-nourished. He is active and cooperative. No distress. Nasal cannula in place.   HENT:   Head: Normocephalic.   Eyes: Conjunctivae are normal.   Neck: No JVD present.   Cardiovascular: Normal rate.    Pulmonary/Chest: No tachypnea. No respiratory distress. He has decreased breath sounds in the right lower field, the left upper field, the left middle field and the left lower field. He exhibits tenderness (rib fracture pain).   IS 2500 cc  Left chest tube to suction, no air leak. Serosang drainage   Abdominal: Soft. Bowel sounds are normal. He exhibits no distension. There is no tenderness. There is no rebound and no guarding.   Musculoskeletal:   Moves all extremities   Neurological: He is alert. GCS eye subscore is 4. GCS verbal subscore is 5. GCS  motor subscore is 6.   Skin: He is not diaphoretic.   Nursing note and vitals reviewed.      Laboratory  Recent Results (from the past 24 hour(s))   HEMOGLOBIN AND HEMATOCRIT    Collection Time: 02/28/19 10:08 PM   Result Value Ref Range    Hemoglobin 12.0 (L) 14.0 - 18.0 g/dL    Hematocrit 35.9 (L) 42.0 - 52.0 %   Basic Metabolic Panel    Collection Time: 03/01/19  5:37 AM   Result Value Ref Range    Sodium 133 (L) 135 - 145 mmol/L    Potassium 4.0 3.6 - 5.5 mmol/L    Chloride 101 96 - 112 mmol/L    Co2 24 20 - 33 mmol/L    Glucose 102 (H) 65 - 99 mg/dL    Bun 12 8 - 22 mg/dL    Creatinine 0.88 0.50 - 1.40 mg/dL    Calcium 8.6 8.5 - 10.5 mg/dL    Anion Gap 8.0 0.0 - 11.9   IRON/TOTAL IRON BIND    Collection Time: 03/01/19  5:37 AM   Result Value Ref Range    Iron 34 (L) 50 - 180 ug/dL    Total Iron Binding 248 (L) 250 - 450 ug/dL    % Saturation 14 (L) 15 - 55 %   CBC WITH DIFFERENTIAL    Collection Time: 03/01/19  5:37 AM   Result Value Ref Range    WBC 6.4 4.8 - 10.8 K/uL    RBC 3.56 (L) 4.70 - 6.10 M/uL    Hemoglobin 11.6 (L) 14.0 - 18.0 g/dL    Hematocrit 34.6 (L) 42.0 - 52.0 %    MCV 97.2 81.4 - 97.8 fL    MCH 32.6 27.0 - 33.0 pg    MCHC 33.5 (L) 33.7 - 35.3 g/dL    RDW 47.7 35.9 - 50.0 fL    Platelet Count 139 (L) 164 - 446 K/uL    MPV 9.2 9.0 - 12.9 fL    Neutrophils-Polys 73.20 (H) 44.00 - 72.00 %    Lymphocytes 14.40 (L) 22.00 - 41.00 %    Monocytes 9.90 0.00 - 13.40 %    Eosinophils 1.40 0.00 - 6.90 %    Basophils 0.60 0.00 - 1.80 %    Immature Granulocytes 0.50 0.00 - 0.90 %    Nucleated RBC 0.00 /100 WBC    Neutrophils (Absolute) 4.66 1.82 - 7.42 K/uL    Lymphs (Absolute) 0.92 (L) 1.00 - 4.80 K/uL    Monos (Absolute) 0.63 0.00 - 0.85 K/uL    Eos (Absolute) 0.09 0.00 - 0.51 K/uL    Baso (Absolute) 0.04 0.00 - 0.12 K/uL    Immature Granulocytes (abs) 0.03 0.00 - 0.11 K/uL    NRBC (Absolute) 0.00 K/uL   ESTIMATED GFR    Collection Time: 03/01/19  5:37 AM   Result Value Ref Range    GFR If   >60 >60 mL/min/1.73 m 2    GFR If Non African American >60 >60 mL/min/1.73 m 2       Fluids    Intake/Output Summary (Last 24 hours) at 03/01/19 1257  Last data filed at 03/01/19 0800   Gross per 24 hour   Intake             1660 ml   Output             3395 ml   Net            -1735 ml       Core Measures & Quality Metrics  Labs reviewed, Medications reviewed and Radiology images reviewed  Robbins catheter: No Robbins      DVT Prophylaxis: Enoxaparin (Lovenox)  DVT prophylaxis - mechanical: SCDs  Ulcer prophylaxis: Not indicated        Total Score: 5    ETOH Screening  CAGE Score: (P) 1  Intervention complete date: 3/1/2019  Patient response to intervention: Denies substance abuse. Intervention not indicated .         Assessment/Plan  Closed fracture of sternum- (present on admission)   Assessment & Plan    Nondisplaced inferior sternal fracture  No retrosternal hematoma  Aggressive multimodal pain management and pulmonary hygiene.   Serial chest radiographs.       Left pulmonary contusion- (present on admission)   Assessment & Plan    O2 to keep sat > 93%  Aggressive multimodal pain management and pulmonary hygiene.   Serial chest radiographs.       Traumatic pneumothorax and hemothorax- (present on admission)   Assessment & Plan    Left hemopneumothroax on CT  CT placed on arrival to ICU  2/28 CXR with mild interval enlargement of small left apical pneumothorax  3/1 CXR pending. Left chest tube to suction. No air leak.  Total in pleura vac 1340 cc  Serial chest radiographs.     Closed fracture of multiple ribs with flail chest- (present on admission)   Assessment & Plan    Anterior lateral left 1, 2, 3, 4, 5, 6, 7 rib fractures.   Left posterior 6th - 12th  rib fractures.   Disruption of the costochondral cartilage involving the left second, third, fourth, fifth, sixth ribs  Flail anterior lateral chest  Aggressive multimodal pain management and pulmonary hygiene.   Serial chest radiographs.      Chronic pain-  (present on admission)   Assessment & Plan    Daily meds for pain.  MS Contin 15mg BID  Oxy IR 15 mg TID  Resumed as part of the multi-modal regimen  3/1 Increase MS Contin to 15 mg TID     Discharge planning issues- (present on admission)   Assessment & Plan    Admission date  2/27/2019  Transfer from SICU  3/1/2019  Not discharged: Not medically cleared   Discharged Date      No contraindication to anticoagulation therapy- (present on admission)   Assessment & Plan    2/27 Pharmacological DVT prophylaxis, Lovenox initiated.      H/O clavicle fracture- (present on admission)   Assessment & Plan    Old Left clavicle seen on CT chest.      Trauma- (present on admission)   Assessment & Plan    15 foot fall from roof onto ice chunks.  Trauma Red Activation.    Mike Barragan MD. Trauma Surgery.          Discussed patient condition with Patient and trauma surgery, Dr. Dawood Adame.

## 2019-03-01 NOTE — CARE PLAN
Problem: Pain Management  Goal: Pain level will decrease to patient's comfort goal  Outcome: PROGRESSING SLOWER THAN EXPECTED  Patient pain still remains relatively uncontrolled at this time. Will discuss pain management plan with MDs during rounds.

## 2019-03-01 NOTE — CARE PLAN
Problem: Bowel/Gastric:  Goal: Normal bowel function is maintained or improved  Outcome: PROGRESSING SLOWER THAN EXPECTED  Patient has not had a bowel movement since admission. Bowel protocol in place and high priority on mobility to keep bowels moving properly.

## 2019-03-01 NOTE — ASSESSMENT & PLAN NOTE
Admission date  2/27/2019  Transfer from SICU  3/1/2019  Transfer to SICU 3/7/2019  Transfer fromo SICU 3/9/2019  Rehab/SNF consult NA  Medically cleared for discharge NA - managing a-fib  Not discharged: NA  Reasons:  NA  Discharged Date NA

## 2019-03-01 NOTE — CARE PLAN
Problem: Hyperinflation:  Goal: Prevent or improve atelectasis     Intervention: Perform hyperinflation therapy as indicated by assessment  PEP/IS  Pt achieves 2750mL with good effort and technique.

## 2019-03-01 NOTE — CARE PLAN
Problem: Pain Management  Goal: Pain level will decrease to patient's comfort goal    Intervention: Follow pain managment plan developed in collaboration with patient and Interdisciplinary Team  Pain assessed utilizing 0-10 pain assessment tool. Pain medication given per MAR      Problem: Safety  Goal: Will remain free from falls    Intervention: Implement fall precautions  Bed in lowest/locked position, call light in reach, bedside table within reach. Patient demonstrates appropriate use of call light. Patient verbalizes understanding to not mobilize without staff present

## 2019-03-01 NOTE — PROGRESS NOTES
MD notified of patient's chest tube dumping approximately 1L of bloody drainage during mobilization. Telephone order received for STAT hemoglobin lab. Patient vital signs stable at this time. HR 85 /56 SPO2 96% on 3L NC.

## 2019-03-01 NOTE — DISCHARGE PLANNING
Current documentation reveals a potential for acute inpatient rehabilitation, pending TX evals and D/C resources/support.  Please consider a PMR referral to assist with discharge planning.

## 2019-03-02 ENCOUNTER — APPOINTMENT (OUTPATIENT)
Dept: RADIOLOGY | Facility: MEDICAL CENTER | Age: 59
DRG: 166 | End: 2019-03-02
Attending: SURGERY
Payer: COMMERCIAL

## 2019-03-02 LAB
ABO GROUP BLD: NORMAL
ANION GAP SERPL CALC-SCNC: 9 MMOL/L (ref 0–11.9)
BASOPHILS # BLD AUTO: 0.3 % (ref 0–1.8)
BASOPHILS # BLD: 0.02 K/UL (ref 0–0.12)
BLD GP AB SCN SERPL QL: NORMAL
BUN SERPL-MCNC: 11 MG/DL (ref 8–22)
CALCIUM SERPL-MCNC: 8.1 MG/DL (ref 8.5–10.5)
CHLORIDE SERPL-SCNC: 103 MMOL/L (ref 96–112)
CO2 SERPL-SCNC: 24 MMOL/L (ref 20–33)
CREAT SERPL-MCNC: 0.83 MG/DL (ref 0.5–1.4)
EOSINOPHIL # BLD AUTO: 0.12 K/UL (ref 0–0.51)
EOSINOPHIL NFR BLD: 1.9 % (ref 0–6.9)
ERYTHROCYTE [DISTWIDTH] IN BLOOD BY AUTOMATED COUNT: 45.5 FL (ref 35.9–50)
GLUCOSE SERPL-MCNC: 111 MG/DL (ref 65–99)
HCT VFR BLD AUTO: 36.3 % (ref 42–52)
HGB BLD-MCNC: 12.2 G/DL (ref 14–18)
IMM GRANULOCYTES # BLD AUTO: 0.05 K/UL (ref 0–0.11)
IMM GRANULOCYTES NFR BLD AUTO: 0.8 % (ref 0–0.9)
LYMPHOCYTES # BLD AUTO: 1.03 K/UL (ref 1–4.8)
LYMPHOCYTES NFR BLD: 16.1 % (ref 22–41)
MCH RBC QN AUTO: 32 PG (ref 27–33)
MCHC RBC AUTO-ENTMCNC: 33.6 G/DL (ref 33.7–35.3)
MCV RBC AUTO: 95.3 FL (ref 81.4–97.8)
MONOCYTES # BLD AUTO: 0.63 K/UL (ref 0–0.85)
MONOCYTES NFR BLD AUTO: 9.9 % (ref 0–13.4)
NEUTROPHILS # BLD AUTO: 4.54 K/UL (ref 1.82–7.42)
NEUTROPHILS NFR BLD: 71 % (ref 44–72)
NRBC # BLD AUTO: 0 K/UL
NRBC BLD-RTO: 0 /100 WBC
PLATELET # BLD AUTO: 154 K/UL (ref 164–446)
PMV BLD AUTO: 8.8 FL (ref 9–12.9)
POTASSIUM SERPL-SCNC: 3.7 MMOL/L (ref 3.6–5.5)
RBC # BLD AUTO: 3.81 M/UL (ref 4.7–6.1)
RH BLD: NORMAL
SODIUM SERPL-SCNC: 136 MMOL/L (ref 135–145)
WBC # BLD AUTO: 6.4 K/UL (ref 4.8–10.8)

## 2019-03-02 PROCEDURE — A9270 NON-COVERED ITEM OR SERVICE: HCPCS | Performed by: SURGERY

## 2019-03-02 PROCEDURE — 71045 X-RAY EXAM CHEST 1 VIEW: CPT

## 2019-03-02 PROCEDURE — 97161 PT EVAL LOW COMPLEX 20 MIN: CPT

## 2019-03-02 PROCEDURE — A9270 NON-COVERED ITEM OR SERVICE: HCPCS | Performed by: NURSE PRACTITIONER

## 2019-03-02 PROCEDURE — 86901 BLOOD TYPING SEROLOGIC RH(D): CPT

## 2019-03-02 PROCEDURE — 86850 RBC ANTIBODY SCREEN: CPT

## 2019-03-02 PROCEDURE — 700102 HCHG RX REV CODE 250 W/ 637 OVERRIDE(OP): Performed by: SURGERY

## 2019-03-02 PROCEDURE — 700101 HCHG RX REV CODE 250: Performed by: SURGERY

## 2019-03-02 PROCEDURE — 700112 HCHG RX REV CODE 229: Performed by: SURGERY

## 2019-03-02 PROCEDURE — 80048 BASIC METABOLIC PNL TOTAL CA: CPT

## 2019-03-02 PROCEDURE — 71250 CT THORAX DX C-: CPT

## 2019-03-02 PROCEDURE — 86900 BLOOD TYPING SEROLOGIC ABO: CPT

## 2019-03-02 PROCEDURE — 770001 HCHG ROOM/CARE - MED/SURG/GYN PRIV*

## 2019-03-02 PROCEDURE — 700102 HCHG RX REV CODE 250 W/ 637 OVERRIDE(OP): Performed by: NURSE PRACTITIONER

## 2019-03-02 PROCEDURE — 700111 HCHG RX REV CODE 636 W/ 250 OVERRIDE (IP): Performed by: SURGERY

## 2019-03-02 PROCEDURE — 97162 PT EVAL MOD COMPLEX 30 MIN: CPT

## 2019-03-02 PROCEDURE — 97535 SELF CARE MNGMENT TRAINING: CPT

## 2019-03-02 PROCEDURE — 36415 COLL VENOUS BLD VENIPUNCTURE: CPT

## 2019-03-02 PROCEDURE — 85025 COMPLETE CBC W/AUTO DIFF WBC: CPT

## 2019-03-02 RX ORDER — MORPHINE SULFATE 15 MG/1
30 TABLET, FILM COATED, EXTENDED RELEASE ORAL EVERY 12 HOURS
Status: DISCONTINUED | OUTPATIENT
Start: 2019-03-02 | End: 2019-03-05

## 2019-03-02 RX ADMIN — ENOXAPARIN SODIUM 30 MG: 100 INJECTION SUBCUTANEOUS at 16:57

## 2019-03-02 RX ADMIN — OXYCODONE HYDROCHLORIDE 15 MG: 5 TABLET ORAL at 10:06

## 2019-03-02 RX ADMIN — OXYCODONE HYDROCHLORIDE 15 MG: 5 TABLET ORAL at 20:19

## 2019-03-02 RX ADMIN — ENOXAPARIN SODIUM 30 MG: 100 INJECTION SUBCUTANEOUS at 05:44

## 2019-03-02 RX ADMIN — HYDROMORPHONE HYDROCHLORIDE 1 MG: 2 INJECTION INTRAMUSCULAR; INTRAVENOUS; SUBCUTANEOUS at 16:53

## 2019-03-02 RX ADMIN — MAGNESIUM HYDROXIDE 30 ML: 400 SUSPENSION ORAL at 05:43

## 2019-03-02 RX ADMIN — HYDROMORPHONE HYDROCHLORIDE 1 MG: 2 INJECTION INTRAMUSCULAR; INTRAVENOUS; SUBCUTANEOUS at 06:55

## 2019-03-02 RX ADMIN — KETOROLAC TROMETHAMINE 30 MG: 30 INJECTION, SOLUTION INTRAMUSCULAR; INTRAVENOUS at 05:44

## 2019-03-02 RX ADMIN — OXYCODONE HYDROCHLORIDE 15 MG: 5 TABLET ORAL at 13:08

## 2019-03-02 RX ADMIN — ACETAMINOPHEN 1000 MG: 500 TABLET ORAL at 05:43

## 2019-03-02 RX ADMIN — DOCUSATE SODIUM 100 MG: 100 CAPSULE, LIQUID FILLED ORAL at 16:57

## 2019-03-02 RX ADMIN — DIAZEPAM 5 MG: 2 TABLET ORAL at 16:09

## 2019-03-02 RX ADMIN — KETOROLAC TROMETHAMINE 30 MG: 30 INJECTION, SOLUTION INTRAMUSCULAR; INTRAVENOUS at 11:29

## 2019-03-02 RX ADMIN — ACETAMINOPHEN 1000 MG: 500 TABLET ORAL at 01:00

## 2019-03-02 RX ADMIN — DOCUSATE SODIUM 100 MG: 100 CAPSULE, LIQUID FILLED ORAL at 05:43

## 2019-03-02 RX ADMIN — ACETAMINOPHEN 1000 MG: 500 TABLET ORAL at 17:03

## 2019-03-02 RX ADMIN — LIDOCAINE 2 PATCH: 50 PATCH TOPICAL at 17:10

## 2019-03-02 RX ADMIN — DIAZEPAM 5 MG: 2 TABLET ORAL at 11:48

## 2019-03-02 RX ADMIN — POLYETHYLENE GLYCOL 3350 1 PACKET: 17 POWDER, FOR SOLUTION ORAL at 17:01

## 2019-03-02 RX ADMIN — OXYCODONE HYDROCHLORIDE 15 MG: 5 TABLET ORAL at 16:08

## 2019-03-02 RX ADMIN — POLYETHYLENE GLYCOL 3350 1 PACKET: 17 POWDER, FOR SOLUTION ORAL at 05:43

## 2019-03-02 RX ADMIN — HYDROMORPHONE HYDROCHLORIDE 1 MG: 2 INJECTION INTRAMUSCULAR; INTRAVENOUS; SUBCUTANEOUS at 03:36

## 2019-03-02 RX ADMIN — SENNOSIDES AND DOCUSATE SODIUM 1 TABLET: 8.6; 5 TABLET ORAL at 20:19

## 2019-03-02 RX ADMIN — OXYCODONE HYDROCHLORIDE 15 MG: 5 TABLET ORAL at 03:36

## 2019-03-02 RX ADMIN — GABAPENTIN 300 MG: 300 CAPSULE ORAL at 16:57

## 2019-03-02 RX ADMIN — MORPHINE SULFATE 15 MG: 15 TABLET, EXTENDED RELEASE ORAL at 05:43

## 2019-03-02 RX ADMIN — KETOROLAC TROMETHAMINE 30 MG: 30 INJECTION, SOLUTION INTRAMUSCULAR; INTRAVENOUS at 01:00

## 2019-03-02 RX ADMIN — GABAPENTIN 300 MG: 300 CAPSULE ORAL at 05:43

## 2019-03-02 RX ADMIN — MORPHINE SULFATE 30 MG: 15 TABLET, EXTENDED RELEASE ORAL at 17:01

## 2019-03-02 RX ADMIN — ACETAMINOPHEN 1000 MG: 500 TABLET ORAL at 12:00

## 2019-03-02 RX ADMIN — GABAPENTIN 300 MG: 300 CAPSULE ORAL at 11:29

## 2019-03-02 RX ADMIN — OXYCODONE HYDROCHLORIDE 15 MG: 5 TABLET ORAL at 06:55

## 2019-03-02 ASSESSMENT — COGNITIVE AND FUNCTIONAL STATUS - GENERAL
MOBILITY SCORE: 14
MOVING FROM LYING ON BACK TO SITTING ON SIDE OF FLAT BED: UNABLE
MOVING TO AND FROM BED TO CHAIR: UNABLE
STANDING UP FROM CHAIR USING ARMS: A LITTLE
SUGGESTED CMS G CODE MODIFIER MOBILITY: CL
TURNING FROM BACK TO SIDE WHILE IN FLAT BAD: UNABLE

## 2019-03-02 ASSESSMENT — GAIT ASSESSMENTS
DISTANCE (FEET): 500
DEVIATION: ANTALGIC
GAIT LEVEL OF ASSIST: STAND BY ASSIST

## 2019-03-02 ASSESSMENT — ENCOUNTER SYMPTOMS
PSYCHIATRIC NEGATIVE: 1
MYALGIAS: 1
SHORTNESS OF BREATH: 1
ROS GI COMMENTS: BM PRIOR TO ARRIVAL
NEUROLOGICAL NEGATIVE: 1

## 2019-03-02 NOTE — PROGRESS NOTES
2 RN skin check complete:  L chest tube site; no new drainage, CDI.  L bruising.  Sacrum intact; patient moves self, declines q 2 turns.

## 2019-03-02 NOTE — PROGRESS NOTES
Trauma / Surgical Daily Progress Note    Date of Service  3/2/2019    Chief Complaint  58 y.o. male admitted 2/27/2019 as a trauma red - fall - flail chest  HD # 3    Interval Events  Transfer to surgical floor  Tertiary complete - no further findings   RAP/SBIRT complete  Continues with difficlut pain control impeding mobilizing  Increase MS Contin to 30 BID - wean IV Dilaudid  CT output 90 cc - place to water seal  IS 2500 cc  On bowel protocol - educated    CXR unchanged - CT to evaluate for retained hemothorax    Review of Systems  Review of Systems   Constitutional: Positive for malaise/fatigue.   HENT: Negative.    Respiratory: Positive for shortness of breath.    Gastrointestinal:        BM prior to arrival    Musculoskeletal: Positive for myalgias.        History of chronic pain    Neurological: Negative.    Psychiatric/Behavioral: Negative.    All other systems reviewed and are negative.       Vital Signs  Temp:  [35.8 °C (96.4 °F)-37.4 °C (99.4 °F)] 35.8 °C (96.4 °F)  Pulse:  [] 110  Resp:  [16-26] 18  BP: (107-139)/(68-90) 108/75  SpO2:  [92 %-98 %] 94 %    Physical Exam  Physical Exam   Constitutional: He is oriented to person, place, and time. He appears well-developed and well-nourished. He is active and cooperative. No distress.   HENT:   Head: Normocephalic.   Eyes: Conjunctivae are normal.   Neck: No JVD present.   Pulmonary/Chest: Effort normal. No tachypnea. No respiratory distress. He has decreased breath sounds in the right lower field, the left upper field, the left middle field and the left lower field. He exhibits tenderness (rib fracture pain).   Left chest tube to suction, no air leak. Serosang drainage    Abdominal: Soft. There is no tenderness.   Musculoskeletal: Normal range of motion.   Neurological: He is alert and oriented to person, place, and time. GCS eye subscore is 4. GCS verbal subscore is 5. GCS motor subscore is 6.   Skin: Skin is warm and dry.   Nursing note and vitals  reviewed.      Laboratory  Recent Results (from the past 24 hour(s))   Basic Metabolic Panel    Collection Time: 03/02/19  3:23 AM   Result Value Ref Range    Sodium 136 135 - 145 mmol/L    Potassium 3.7 3.6 - 5.5 mmol/L    Chloride 103 96 - 112 mmol/L    Co2 24 20 - 33 mmol/L    Glucose 111 (H) 65 - 99 mg/dL    Bun 11 8 - 22 mg/dL    Creatinine 0.83 0.50 - 1.40 mg/dL    Calcium 8.1 (L) 8.5 - 10.5 mg/dL    Anion Gap 9.0 0.0 - 11.9   CBC with Differential: Tomorrow AM    Collection Time: 03/02/19  3:23 AM   Result Value Ref Range    WBC 6.4 4.8 - 10.8 K/uL    RBC 3.81 (L) 4.70 - 6.10 M/uL    Hemoglobin 12.2 (L) 14.0 - 18.0 g/dL    Hematocrit 36.3 (L) 42.0 - 52.0 %    MCV 95.3 81.4 - 97.8 fL    MCH 32.0 27.0 - 33.0 pg    MCHC 33.6 (L) 33.7 - 35.3 g/dL    RDW 45.5 35.9 - 50.0 fL    Platelet Count 154 (L) 164 - 446 K/uL    MPV 8.8 (L) 9.0 - 12.9 fL    Neutrophils-Polys 71.00 44.00 - 72.00 %    Lymphocytes 16.10 (L) 22.00 - 41.00 %    Monocytes 9.90 0.00 - 13.40 %    Eosinophils 1.90 0.00 - 6.90 %    Basophils 0.30 0.00 - 1.80 %    Immature Granulocytes 0.80 0.00 - 0.90 %    Nucleated RBC 0.00 /100 WBC    Neutrophils (Absolute) 4.54 1.82 - 7.42 K/uL    Lymphs (Absolute) 1.03 1.00 - 4.80 K/uL    Monos (Absolute) 0.63 0.00 - 0.85 K/uL    Eos (Absolute) 0.12 0.00 - 0.51 K/uL    Baso (Absolute) 0.02 0.00 - 0.12 K/uL    Immature Granulocytes (abs) 0.05 0.00 - 0.11 K/uL    NRBC (Absolute) 0.00 K/uL   ESTIMATED GFR    Collection Time: 03/02/19  3:23 AM   Result Value Ref Range    GFR If African American >60 >60 mL/min/1.73 m 2    GFR If Non African American >60 >60 mL/min/1.73 m 2       Fluids    Intake/Output Summary (Last 24 hours) at 03/02/19 1358  Last data filed at 03/02/19 0723   Gross per 24 hour   Intake              890 ml   Output             4030 ml   Net            -3140 ml       Core Measures & Quality Metrics  Core Measures & Quality Metrics  MAMI Score  ETOH Screening    Assessment/Plan  Left pulmonary contusion-  (present on admission)   Assessment & Plan    O2 to keep sat > 93%  Aggressive multimodal pain management and pulmonary hygiene.   3/2 O2 at 3 L/m - sat 94%  Serial chest radiographs.       Traumatic pneumothorax and hemothorax- (present on admission)   Assessment & Plan    Left hemopneumothroax on CT  CT placed on arrival to ICU  2/28 CXR with mild interval enlargement of small left apical pneumothorax  3/1 CXR with resolution of left apical pneumothorax.  3/2 CT atrium 1450 cc / 90 cc x 24 hours / no air leak / place to water seal   CT chest to evaluate for retained hemothorax  Serial chest radiographs.     Closed fracture of multiple ribs with flail chest- (present on admission)   Assessment & Plan    Anterior lateral left 1, 2, 3, 4, 5, 6, 7 rib fractures.   Left posterior 6th - 12th  rib fractures.   Disruption of the costochondral cartilage involving the left second, third, fourth, fifth, sixth ribs  Flail anterior lateral chest  Aggressive multimodal pain management and pulmonary hygiene.  3/2 IS 2500 cc   Serial chest radiographs.      Closed fracture of sternum- (present on admission)   Assessment & Plan    Nondisplaced inferior sternal fracture  No retrosternal hematoma  Aggressive multimodal pain management and pulmonary hygiene.   Serial chest radiographs.       Chronic pain- (present on admission)   Assessment & Plan    Daily meds for pain.  MS Contin 15mg BID  Oxy IR 15 mg TID  Resumed as part of the multi-modal regimen  3/1 Increase MS Contin to 15 mg TID     Discharge planning issues- (present on admission)   Assessment & Plan    Admission date  2/27/2019  Transfer from SICU  3/1/2019  Not discharged: Not medically cleared   Discharged Date      No contraindication to anticoagulation therapy- (present on admission)   Assessment & Plan    2/27 Pharmacological DVT prophylaxis, Lovenox initiated.      H/O clavicle fracture- (present on admission)   Assessment & Plan    Old Left clavicle seen on CT  chest.      Trauma- (present on admission)   Assessment & Plan    15 foot fall from roof onto ice chunks.  Trauma Red Activation.    Mike Barragan MD. Trauma Surgery.      Discussed patient condition with RN, Patient and trauma surgery. Dr. Barragan.    Patient seen, data reviewed and discussed.  Agree with assessment and plan.  SERA

## 2019-03-02 NOTE — PROGRESS NOTES
Report received from night shift RN, assumed Care.   Patient is AOx4, responds appropriately.      Pt reports pain 7/10, medicated recently. Use of non-pharmacological pain intervention utilized. Educated patient on staggering of PRN pain medication schedule in order to better manage pain, pt in agreement.   Patient is tolerating regular diet, denies nausea/vomiting. + flatus. Left chest tube in place to low continous suction (-20cm), no air leak present. Chest tube dressing clean, dry, and intact. Lungs diminished on the left, clear on the right. No wheezing or rhonchi noted.   Up stand by assist with steady gait.  IS max pull 3000, strong effort.     Plan of care discussed, all questions answered.    Educated on use of call light and importance of calling before getting out of bed. Pt verbalizes understanding.    Call light and belongings within reach, treaded slipper socks on, bed in lowest locked position.  All needs met at this time.

## 2019-03-02 NOTE — CARE PLAN
Problem: Safety  Goal: Will remain free from falls  Outcome: PROGRESSING AS EXPECTED  Educated pt on fall risk, reminded pt to call for assistance.     Problem: Respiratory:  Goal: Respiratory status will improve  Outcome: PROGRESSING AS EXPECTED  Encourage IS use and ambulation

## 2019-03-02 NOTE — PROGRESS NOTES
2 RN skin check completed.    Left chest tube to suction.  Left rib cage bruising.  Lower abdominal bruising.  Scattered abrasions.    No other skin abnormalities noted.

## 2019-03-02 NOTE — CARE PLAN
Problem: Hyperinflation:  Goal: Prevent or improve atelectasis  Outcome: PROGRESSING AS EXPECTED    Intervention: Instruct incentive spirometry usage  2700  Intervention: Perform hyperinflation therapy as indicated by assessment  IS QID

## 2019-03-02 NOTE — PROGRESS NOTES
A/Ox 4.  Tachycardic, APRN aware, instructed to work on pain control.  Reports left rib cage pain 8 /10, medicated per MAR, heat applied, splinting education provided.    Left chest tube to -20cm suction, no air leak, no output after transfer.  Left rib cage bruising, generalized abrasions.  RA- 3L NC, satting >90%, denies SOB or difficulty breathing, IS pulling 3000 appropriately.    + DANIELS, denies numbness and tingling, pain with movement.  SCDs refused.  + normoactive BSx4, + flatus, no BM this shift, LBM pta, denies n/v.  Tolerated regular diet well.  + void, QS.  Up with SBA, tolerates activity well.  Refusing to mobilize due to pain, repositions self frequently.  PIV x2, S/L, PO intake encouraged.    Call light within reach, calls appropriately.  Bed in lowest locked position.

## 2019-03-03 ENCOUNTER — APPOINTMENT (OUTPATIENT)
Dept: RADIOLOGY | Facility: MEDICAL CENTER | Age: 59
DRG: 166 | End: 2019-03-03
Attending: SURGERY
Payer: COMMERCIAL

## 2019-03-03 LAB
ANION GAP SERPL CALC-SCNC: 7 MMOL/L (ref 0–11.9)
BASOPHILS # BLD AUTO: 0.5 % (ref 0–1.8)
BASOPHILS # BLD: 0.03 K/UL (ref 0–0.12)
BUN SERPL-MCNC: 8 MG/DL (ref 8–22)
CALCIUM SERPL-MCNC: 8.3 MG/DL (ref 8.5–10.5)
CFT BLD TEG: 5 MIN (ref 5–10)
CHLORIDE SERPL-SCNC: 103 MMOL/L (ref 96–112)
CLOT ANGLE BLD TEG: 67.5 DEGREES (ref 53–72)
CLOT LYSIS 30M P MA LENFR BLD TEG: 0 % (ref 0–8)
CO2 SERPL-SCNC: 27 MMOL/L (ref 20–33)
CREAT SERPL-MCNC: 0.87 MG/DL (ref 0.5–1.4)
CT.EXTRINSIC BLD ROTEM: 1.6 MIN (ref 1–3)
EOSINOPHIL # BLD AUTO: 0.17 K/UL (ref 0–0.51)
EOSINOPHIL NFR BLD: 2.7 % (ref 0–6.9)
ERYTHROCYTE [DISTWIDTH] IN BLOOD BY AUTOMATED COUNT: 47.2 FL (ref 35.9–50)
GLUCOSE SERPL-MCNC: 122 MG/DL (ref 65–99)
HCT VFR BLD AUTO: 34.2 % (ref 42–52)
HGB BLD-MCNC: 11.8 G/DL (ref 14–18)
IMM GRANULOCYTES # BLD AUTO: 0.03 K/UL (ref 0–0.11)
IMM GRANULOCYTES NFR BLD AUTO: 0.5 % (ref 0–0.9)
LYMPHOCYTES # BLD AUTO: 0.88 K/UL (ref 1–4.8)
LYMPHOCYTES NFR BLD: 14 % (ref 22–41)
MCF BLD TEG: 65.2 MM (ref 50–70)
MCH RBC QN AUTO: 33.4 PG (ref 27–33)
MCHC RBC AUTO-ENTMCNC: 34.5 G/DL (ref 33.7–35.3)
MCV RBC AUTO: 96.9 FL (ref 81.4–97.8)
MONOCYTES # BLD AUTO: 0.62 K/UL (ref 0–0.85)
MONOCYTES NFR BLD AUTO: 9.9 % (ref 0–13.4)
NEUTROPHILS # BLD AUTO: 4.56 K/UL (ref 1.82–7.42)
NEUTROPHILS NFR BLD: 72.4 % (ref 44–72)
NRBC # BLD AUTO: 0 K/UL
NRBC BLD-RTO: 0 /100 WBC
PA AA BLD-ACNC: 0 %
PA ADP BLD-ACNC: 0 %
PLATELET # BLD AUTO: 173 K/UL (ref 164–446)
PMV BLD AUTO: 8.8 FL (ref 9–12.9)
POTASSIUM SERPL-SCNC: 4.4 MMOL/L (ref 3.6–5.5)
RBC # BLD AUTO: 3.53 M/UL (ref 4.7–6.1)
SODIUM SERPL-SCNC: 137 MMOL/L (ref 135–145)
TEG ALGORITHM TGALG: NORMAL
WBC # BLD AUTO: 6.3 K/UL (ref 4.8–10.8)

## 2019-03-03 PROCEDURE — 0WCB4ZZ EXTIRPATION OF MATTER FROM LEFT PLEURAL CAVITY, PERCUTANEOUS ENDOSCOPIC APPROACH: ICD-10-PCS | Performed by: INTERNAL MEDICINE

## 2019-03-03 PROCEDURE — 700101 HCHG RX REV CODE 250

## 2019-03-03 PROCEDURE — A9270 NON-COVERED ITEM OR SERVICE: HCPCS | Performed by: ANESTHESIOLOGY

## 2019-03-03 PROCEDURE — 160009 HCHG ANES TIME/MIN: Performed by: SURGERY

## 2019-03-03 PROCEDURE — 71045 X-RAY EXAM CHEST 1 VIEW: CPT

## 2019-03-03 PROCEDURE — A9270 NON-COVERED ITEM OR SERVICE: HCPCS | Performed by: SURGERY

## 2019-03-03 PROCEDURE — 500312 HCHG CONNECTOR, BLAKE DRAIN 1-WAY: Performed by: SURGERY

## 2019-03-03 PROCEDURE — 160002 HCHG RECOVERY MINUTES (STAT): Performed by: SURGERY

## 2019-03-03 PROCEDURE — 160048 HCHG OR STATISTICAL LEVEL 1-5: Performed by: SURGERY

## 2019-03-03 PROCEDURE — 700102 HCHG RX REV CODE 250 W/ 637 OVERRIDE(OP): Performed by: SURGERY

## 2019-03-03 PROCEDURE — 85384 FIBRINOGEN ACTIVITY: CPT

## 2019-03-03 PROCEDURE — 700112 HCHG RX REV CODE 229: Performed by: SURGERY

## 2019-03-03 PROCEDURE — 160035 HCHG PACU - 1ST 60 MINS PHASE I: Performed by: SURGERY

## 2019-03-03 PROCEDURE — 700101 HCHG RX REV CODE 250: Performed by: SURGERY

## 2019-03-03 PROCEDURE — 700111 HCHG RX REV CODE 636 W/ 250 OVERRIDE (IP): Performed by: SURGERY

## 2019-03-03 PROCEDURE — 160029 HCHG SURGERY MINUTES - 1ST 30 MINS LEVEL 4: Performed by: SURGERY

## 2019-03-03 PROCEDURE — 0W9B40Z DRAINAGE OF LEFT PLEURAL CAVITY WITH DRAINAGE DEVICE, PERCUTANEOUS ENDOSCOPIC APPROACH: ICD-10-PCS | Performed by: INTERNAL MEDICINE

## 2019-03-03 PROCEDURE — 0PS14ZZ REPOSITION 1 TO 2 RIBS, PERCUTANEOUS ENDOSCOPIC APPROACH: ICD-10-PCS | Performed by: INTERNAL MEDICINE

## 2019-03-03 PROCEDURE — 85347 COAGULATION TIME ACTIVATED: CPT

## 2019-03-03 PROCEDURE — 85025 COMPLETE CBC W/AUTO DIFF WBC: CPT

## 2019-03-03 PROCEDURE — 500385 HCHG DRAIN, PLEUROVAC ADUL: Performed by: SURGERY

## 2019-03-03 PROCEDURE — A9270 NON-COVERED ITEM OR SERVICE: HCPCS | Performed by: NURSE PRACTITIONER

## 2019-03-03 PROCEDURE — 160041 HCHG SURGERY MINUTES - EA ADDL 1 MIN LEVEL 4: Performed by: SURGERY

## 2019-03-03 PROCEDURE — 700102 HCHG RX REV CODE 250 W/ 637 OVERRIDE(OP): Performed by: ANESTHESIOLOGY

## 2019-03-03 PROCEDURE — 500378 HCHG DRAIN, J-VAC ROUND 19FR: Performed by: SURGERY

## 2019-03-03 PROCEDURE — 160036 HCHG PACU - EA ADDL 30 MINS PHASE I: Performed by: SURGERY

## 2019-03-03 PROCEDURE — 36415 COLL VENOUS BLD VENIPUNCTURE: CPT

## 2019-03-03 PROCEDURE — 500670: Performed by: SURGERY

## 2019-03-03 PROCEDURE — 700111 HCHG RX REV CODE 636 W/ 250 OVERRIDE (IP)

## 2019-03-03 PROCEDURE — 770001 HCHG ROOM/CARE - MED/SURG/GYN PRIV*

## 2019-03-03 PROCEDURE — 700111 HCHG RX REV CODE 636 W/ 250 OVERRIDE (IP): Performed by: ANESTHESIOLOGY

## 2019-03-03 PROCEDURE — 85576 BLOOD PLATELET AGGREGATION: CPT | Mod: 91

## 2019-03-03 PROCEDURE — 501581 HCHG TROCAR: Performed by: SURGERY

## 2019-03-03 PROCEDURE — 501445 HCHG STAPLER, SKIN DISP: Performed by: SURGERY

## 2019-03-03 PROCEDURE — 501838 HCHG SUTURE GENERAL: Performed by: SURGERY

## 2019-03-03 PROCEDURE — 80048 BASIC METABOLIC PNL TOTAL CA: CPT

## 2019-03-03 PROCEDURE — 700102 HCHG RX REV CODE 250 W/ 637 OVERRIDE(OP): Performed by: NURSE PRACTITIONER

## 2019-03-03 RX ORDER — DIPHENHYDRAMINE HYDROCHLORIDE 50 MG/ML
12.5 INJECTION INTRAMUSCULAR; INTRAVENOUS
Status: DISCONTINUED | OUTPATIENT
Start: 2019-03-03 | End: 2019-03-03 | Stop reason: HOSPADM

## 2019-03-03 RX ORDER — MEPERIDINE HYDROCHLORIDE 25 MG/ML
12.5 INJECTION INTRAMUSCULAR; INTRAVENOUS; SUBCUTANEOUS
Status: DISCONTINUED | OUTPATIENT
Start: 2019-03-03 | End: 2019-03-03 | Stop reason: HOSPADM

## 2019-03-03 RX ORDER — ACETAMINOPHEN 500 MG
1000 TABLET ORAL EVERY 4 HOURS PRN
Status: DISCONTINUED | OUTPATIENT
Start: 2019-03-03 | End: 2019-03-03 | Stop reason: HOSPADM

## 2019-03-03 RX ORDER — CELECOXIB 200 MG/1
200 CAPSULE ORAL 2 TIMES DAILY
Status: DISCONTINUED | OUTPATIENT
Start: 2019-03-03 | End: 2019-03-12 | Stop reason: HOSPADM

## 2019-03-03 RX ORDER — HALOPERIDOL 5 MG/ML
1 INJECTION INTRAMUSCULAR
Status: DISCONTINUED | OUTPATIENT
Start: 2019-03-03 | End: 2019-03-03 | Stop reason: HOSPADM

## 2019-03-03 RX ORDER — BUPIVACAINE HYDROCHLORIDE AND EPINEPHRINE 5; 5 MG/ML; UG/ML
INJECTION, SOLUTION EPIDURAL; INTRACAUDAL; PERINEURAL
Status: DISCONTINUED | OUTPATIENT
Start: 2019-03-03 | End: 2019-03-03 | Stop reason: HOSPADM

## 2019-03-03 RX ORDER — HYDROMORPHONE HYDROCHLORIDE 1 MG/ML
INJECTION, SOLUTION INTRAMUSCULAR; INTRAVENOUS; SUBCUTANEOUS
Status: COMPLETED
Start: 2019-03-03 | End: 2019-03-03

## 2019-03-03 RX ORDER — HYDROMORPHONE HYDROCHLORIDE 1 MG/ML
0.1 INJECTION, SOLUTION INTRAMUSCULAR; INTRAVENOUS; SUBCUTANEOUS
Status: DISCONTINUED | OUTPATIENT
Start: 2019-03-03 | End: 2019-03-03 | Stop reason: HOSPADM

## 2019-03-03 RX ORDER — MIDAZOLAM HYDROCHLORIDE 1 MG/ML
1 INJECTION INTRAMUSCULAR; INTRAVENOUS
Status: DISCONTINUED | OUTPATIENT
Start: 2019-03-03 | End: 2019-03-03 | Stop reason: HOSPADM

## 2019-03-03 RX ORDER — ONDANSETRON 2 MG/ML
4 INJECTION INTRAMUSCULAR; INTRAVENOUS
Status: DISCONTINUED | OUTPATIENT
Start: 2019-03-03 | End: 2019-03-03 | Stop reason: HOSPADM

## 2019-03-03 RX ORDER — OXYCODONE HCL 5 MG/5 ML
10 SOLUTION, ORAL ORAL
Status: DISCONTINUED | OUTPATIENT
Start: 2019-03-03 | End: 2019-03-03 | Stop reason: HOSPADM

## 2019-03-03 RX ORDER — HYDROMORPHONE HYDROCHLORIDE 1 MG/ML
0.4 INJECTION, SOLUTION INTRAMUSCULAR; INTRAVENOUS; SUBCUTANEOUS
Status: DISCONTINUED | OUTPATIENT
Start: 2019-03-03 | End: 2019-03-03 | Stop reason: HOSPADM

## 2019-03-03 RX ORDER — HYDROMORPHONE HYDROCHLORIDE 1 MG/ML
0.2 INJECTION, SOLUTION INTRAMUSCULAR; INTRAVENOUS; SUBCUTANEOUS
Status: DISCONTINUED | OUTPATIENT
Start: 2019-03-03 | End: 2019-03-03 | Stop reason: HOSPADM

## 2019-03-03 RX ORDER — OXYCODONE HCL 5 MG/5 ML
5 SOLUTION, ORAL ORAL
Status: DISCONTINUED | OUTPATIENT
Start: 2019-03-03 | End: 2019-03-03 | Stop reason: HOSPADM

## 2019-03-03 RX ORDER — METOPROLOL TARTRATE 1 MG/ML
1 INJECTION, SOLUTION INTRAVENOUS
Status: DISCONTINUED | OUTPATIENT
Start: 2019-03-03 | End: 2019-03-03 | Stop reason: HOSPADM

## 2019-03-03 RX ORDER — SODIUM CHLORIDE, SODIUM LACTATE, POTASSIUM CHLORIDE, CALCIUM CHLORIDE 600; 310; 30; 20 MG/100ML; MG/100ML; MG/100ML; MG/100ML
INJECTION, SOLUTION INTRAVENOUS CONTINUOUS
Status: DISCONTINUED | OUTPATIENT
Start: 2019-03-03 | End: 2019-03-03 | Stop reason: HOSPADM

## 2019-03-03 RX ADMIN — HYDROMORPHONE HYDROCHLORIDE 1 MG: 2 INJECTION INTRAMUSCULAR; INTRAVENOUS; SUBCUTANEOUS at 06:44

## 2019-03-03 RX ADMIN — HYDROMORPHONE HYDROCHLORIDE 0.4 MG: 1 INJECTION, SOLUTION INTRAMUSCULAR; INTRAVENOUS; SUBCUTANEOUS at 13:16

## 2019-03-03 RX ADMIN — SENNOSIDES AND DOCUSATE SODIUM 1 TABLET: 8.6; 5 TABLET ORAL at 22:06

## 2019-03-03 RX ADMIN — HYDROMORPHONE HYDROCHLORIDE 0.4 MG: 1 INJECTION, SOLUTION INTRAMUSCULAR; INTRAVENOUS; SUBCUTANEOUS at 13:21

## 2019-03-03 RX ADMIN — DIAZEPAM 5 MG: 2 TABLET ORAL at 04:48

## 2019-03-03 RX ADMIN — CELECOXIB 200 MG: 200 CAPSULE ORAL at 17:08

## 2019-03-03 RX ADMIN — MORPHINE SULFATE 30 MG: 15 TABLET, EXTENDED RELEASE ORAL at 17:08

## 2019-03-03 RX ADMIN — HYDROMORPHONE HYDROCHLORIDE 0.2 MG: 1 INJECTION, SOLUTION INTRAMUSCULAR; INTRAVENOUS; SUBCUTANEOUS at 13:26

## 2019-03-03 RX ADMIN — ACETAMINOPHEN 1000 MG: 500 TABLET ORAL at 23:46

## 2019-03-03 RX ADMIN — POLYETHYLENE GLYCOL 3350 1 PACKET: 17 POWDER, FOR SOLUTION ORAL at 17:10

## 2019-03-03 RX ADMIN — FENTANYL CITRATE 50 MCG: 50 INJECTION, SOLUTION INTRAMUSCULAR; INTRAVENOUS at 13:43

## 2019-03-03 RX ADMIN — OXYCODONE HYDROCHLORIDE 15 MG: 5 TABLET ORAL at 17:08

## 2019-03-03 RX ADMIN — HYDROMORPHONE HYDROCHLORIDE 0.4 MG: 1 INJECTION, SOLUTION INTRAMUSCULAR; INTRAVENOUS; SUBCUTANEOUS at 13:49

## 2019-03-03 RX ADMIN — OXYCODONE HYDROCHLORIDE 15 MG: 5 TABLET ORAL at 22:06

## 2019-03-03 RX ADMIN — ACETAMINOPHEN 1000 MG: 500 TABLET ORAL at 17:08

## 2019-03-03 RX ADMIN — LIDOCAINE 2 PATCH: 50 PATCH TOPICAL at 17:10

## 2019-03-03 RX ADMIN — HYDROMORPHONE HYDROCHLORIDE 1 MG: 2 INJECTION INTRAMUSCULAR; INTRAVENOUS; SUBCUTANEOUS at 09:56

## 2019-03-03 RX ADMIN — OXYCODONE HYDROCHLORIDE 15 MG: 5 TABLET ORAL at 01:56

## 2019-03-03 RX ADMIN — DOCUSATE SODIUM 100 MG: 100 CAPSULE, LIQUID FILLED ORAL at 04:43

## 2019-03-03 RX ADMIN — OXYCODONE HYDROCHLORIDE 15 MG: 5 TABLET ORAL at 05:49

## 2019-03-03 RX ADMIN — ACETAMINOPHEN 1000 MG: 500 TABLET ORAL at 04:47

## 2019-03-03 RX ADMIN — HYDROMORPHONE HYDROCHLORIDE 1 MG: 2 INJECTION INTRAMUSCULAR; INTRAVENOUS; SUBCUTANEOUS at 23:42

## 2019-03-03 RX ADMIN — MORPHINE SULFATE 30 MG: 15 TABLET, EXTENDED RELEASE ORAL at 04:43

## 2019-03-03 RX ADMIN — GABAPENTIN 300 MG: 300 CAPSULE ORAL at 04:43

## 2019-03-03 RX ADMIN — FENTANYL CITRATE 50 MCG: 50 INJECTION, SOLUTION INTRAMUSCULAR; INTRAVENOUS at 13:36

## 2019-03-03 RX ADMIN — OXYCODONE HYDROCHLORIDE 15 MG: 5 TABLET ORAL at 09:31

## 2019-03-03 RX ADMIN — HYDROMORPHONE HYDROCHLORIDE 1 MG: 2 INJECTION INTRAMUSCULAR; INTRAVENOUS; SUBCUTANEOUS at 18:20

## 2019-03-03 RX ADMIN — MIDAZOLAM 1 MG: 1 INJECTION INTRAMUSCULAR; INTRAVENOUS at 14:03

## 2019-03-03 RX ADMIN — HYDROMORPHONE HYDROCHLORIDE 0.2 MG: 1 INJECTION, SOLUTION INTRAMUSCULAR; INTRAVENOUS; SUBCUTANEOUS at 13:55

## 2019-03-03 RX ADMIN — HYDROMORPHONE HYDROCHLORIDE 0.4 MG: 1 INJECTION, SOLUTION INTRAMUSCULAR; INTRAVENOUS; SUBCUTANEOUS at 13:41

## 2019-03-03 RX ADMIN — GABAPENTIN 300 MG: 300 CAPSULE ORAL at 17:08

## 2019-03-03 RX ADMIN — ACETAMINOPHEN 1000 MG: 500 TABLET, FILM COATED ORAL at 13:36

## 2019-03-03 RX ADMIN — DOCUSATE SODIUM 100 MG: 100 CAPSULE, LIQUID FILLED ORAL at 17:08

## 2019-03-03 RX ADMIN — ENOXAPARIN SODIUM 30 MG: 100 INJECTION SUBCUTANEOUS at 17:10

## 2019-03-03 RX ADMIN — FENTANYL CITRATE 50 MCG: 50 INJECTION, SOLUTION INTRAMUSCULAR; INTRAVENOUS at 13:57

## 2019-03-03 ASSESSMENT — ENCOUNTER SYMPTOMS
ROS GI COMMENTS: BM PRIOR TO ARRIVAL
MYALGIAS: 1
SHORTNESS OF BREATH: 1
PSYCHIATRIC NEGATIVE: 1
NEUROLOGICAL NEGATIVE: 1

## 2019-03-03 NOTE — PROGRESS NOTES
Trauma / Surgical Daily Progress Note    Date of Service  3/3/2019    Chief Complaint  58 y.o. male admitted 2/27/2019 as a trauma red after fall from roof - flail chest  HD # 4    Interval Events  CT with moderate retained left hemothorax  VATS pending today  May resume diet post op     Left chest marked  All questions answered    Review of Systems  Review of Systems   Constitutional: Positive for malaise/fatigue.   HENT: Negative.    Respiratory: Positive for shortness of breath.    Gastrointestinal:        BM prior to arrival    Musculoskeletal: Positive for myalgias.        History of chronic pain    Neurological: Negative.    Psychiatric/Behavioral: Negative.    All other systems reviewed and are negative.       Vital Signs  Temp:  [36.1 °C (97 °F)-37.8 °C (100 °F)] 37 °C (98.6 °F)  Pulse:  [] 86  Resp:  [16-20] 20  BP: (103-121)/(71-82) 111/79  SpO2:  [90 %-97 %] 97 %    Physical Exam  Physical Exam   Constitutional: He is oriented to person, place, and time. He appears well-developed and well-nourished. He is active and cooperative. No distress.   HENT:   Head: Normocephalic.   Eyes: Conjunctivae are normal.   Neck: No JVD present.   Pulmonary/Chest: Effort normal. He has decreased breath sounds (left ). He exhibits tenderness.   Left chest tube - water seal - no air leak    Abdominal: Soft. There is no tenderness.   Musculoskeletal: Normal range of motion.   Neurological: He is alert and oriented to person, place, and time. GCS eye subscore is 4. GCS verbal subscore is 5. GCS motor subscore is 6.   Skin: Skin is warm and dry.   Nursing note and vitals reviewed.      Laboratory  Recent Results (from the past 24 hour(s))   PLATELET MAPPING WITH BASIC TEG    Collection Time: 03/03/19  4:08 AM   Result Value Ref Range    Reaction Time Initial-R 5.0 5.0 - 10.0 min    Clot Kinetics-K 1.6 1.0 - 3.0 min    Clot Angle-Angle 67.5 53.0 - 72.0 degrees    Maximum Clot Strength-MA 65.2 50.0 - 70.0 mm    Lysis 30  minutes-LY30 0.0 0.0 - 8.0 %    % Inhibition ADP 0.0 %    % Inhibition AA 0.0 %    TEG Algorithm Link Algorithm    CBC WITH DIFFERENTIAL    Collection Time: 03/03/19  4:08 AM   Result Value Ref Range    WBC 6.3 4.8 - 10.8 K/uL    RBC 3.53 (L) 4.70 - 6.10 M/uL    Hemoglobin 11.8 (L) 14.0 - 18.0 g/dL    Hematocrit 34.2 (L) 42.0 - 52.0 %    MCV 96.9 81.4 - 97.8 fL    MCH 33.4 (H) 27.0 - 33.0 pg    MCHC 34.5 33.7 - 35.3 g/dL    RDW 47.2 35.9 - 50.0 fL    Platelet Count 173 164 - 446 K/uL    MPV 8.8 (L) 9.0 - 12.9 fL    Neutrophils-Polys 72.40 (H) 44.00 - 72.00 %    Lymphocytes 14.00 (L) 22.00 - 41.00 %    Monocytes 9.90 0.00 - 13.40 %    Eosinophils 2.70 0.00 - 6.90 %    Basophils 0.50 0.00 - 1.80 %    Immature Granulocytes 0.50 0.00 - 0.90 %    Nucleated RBC 0.00 /100 WBC    Neutrophils (Absolute) 4.56 1.82 - 7.42 K/uL    Lymphs (Absolute) 0.88 (L) 1.00 - 4.80 K/uL    Monos (Absolute) 0.62 0.00 - 0.85 K/uL    Eos (Absolute) 0.17 0.00 - 0.51 K/uL    Baso (Absolute) 0.03 0.00 - 0.12 K/uL    Immature Granulocytes (abs) 0.03 0.00 - 0.11 K/uL    NRBC (Absolute) 0.00 K/uL   Basic Metabolic Panel    Collection Time: 03/03/19  4:08 AM   Result Value Ref Range    Sodium 137 135 - 145 mmol/L    Potassium 4.4 3.6 - 5.5 mmol/L    Chloride 103 96 - 112 mmol/L    Co2 27 20 - 33 mmol/L    Glucose 122 (H) 65 - 99 mg/dL    Bun 8 8 - 22 mg/dL    Creatinine 0.87 0.50 - 1.40 mg/dL    Calcium 8.3 (L) 8.5 - 10.5 mg/dL    Anion Gap 7.0 0.0 - 11.9   ESTIMATED GFR    Collection Time: 03/03/19  4:08 AM   Result Value Ref Range    GFR If African American >60 >60 mL/min/1.73 m 2    GFR If Non African American >60 >60 mL/min/1.73 m 2       Fluids    Intake/Output Summary (Last 24 hours) at 03/03/19 0930  Last data filed at 03/03/19 0400   Gross per 24 hour   Intake              440 ml   Output             1250 ml   Net             -810 ml       Core Measures & Quality Metrics  Labs reviewed, Medications reviewed and Radiology images  reviewed  Robbins catheter: No Robbins      DVT Prophylaxis: Enoxaparin (Lovenox)  DVT prophylaxis - mechanical: SCDs  Ulcer prophylaxis: Not indicated    Assessed for rehab: Patient returned to prior level of function, rehabilitation not indicated at this time    Total Score: 5    ETOH Screening  CAGE Score: 1  Intervention complete date: 3/1/2019  Patient response to intervention: Denies substance abuse. Intervention not indicated.         Assessment/Plan  Left pulmonary contusion- (present on admission)   Assessment & Plan    O2 to keep sat > 93%  Aggressive multimodal pain management and pulmonary hygiene.   3/2 O2 at 3 L/m - sat 94%  Serial chest radiographs.       Traumatic pneumothorax and hemothorax- (present on admission)   Assessment & Plan    Left hemopneumothroax on CT  CT placed on arrival to ICU  2/28 CXR with mild interval enlargement of small left apical pneumothorax  3/1 CXR with resolution of left apical pneumothorax.  3/2 CT atrium 1450 cc / 90 cc x 24 hours / no air leak / place to water seal    - CT chest shows retained hemothorax  3/3 Thoracoscopy pending  Serial chest radiographs.     Closed fracture of multiple ribs with flail chest- (present on admission)   Assessment & Plan    Anterior lateral left 1, 2, 3, 4, 5, 6, 7 rib fractures.   Left posterior 6th - 12th  rib fractures.   Disruption of the costochondral cartilage involving the left second, third, fourth, fifth, sixth ribs  Flail anterior lateral chest  Aggressive multimodal pain management and pulmonary hygiene.  3/2 IS 2500 cc   Serial chest radiographs.      Closed fracture of sternum- (present on admission)   Assessment & Plan    Nondisplaced inferior sternal fracture  No retrosternal hematoma  Aggressive multimodal pain management and pulmonary hygiene.   Serial chest radiographs.       Chronic pain- (present on admission)   Assessment & Plan    Daily meds for pain.  MS Contin 15mg BID  Oxy IR 15 mg TID  Resumed as part of the  multi-modal regimen  3/2 Increase MS Contin to 30 BID     Discharge planning issues- (present on admission)   Assessment & Plan    Admission date  2/27/2019  Transfer from SICU  3/1/2019  Not discharged: Not medically cleared   Discharged Date      No contraindication to anticoagulation therapy- (present on admission)   Assessment & Plan    2/27 Pharmacological DVT prophylaxis, Lovenox initiated.      H/O clavicle fracture- (present on admission)   Assessment & Plan    Old Left clavicle seen on CT chest.      Trauma- (present on admission)   Assessment & Plan    15 foot fall from roof onto ice chunks.  Trauma Red Activation.    Mike Barragan MD. Trauma Surgery.      Discussed patient condition with RN, Patient and trauma surgery. Dr. Barragan.    Patient seen, data reviewed and discussed.  Agree with assessment and plan.  SERA

## 2019-03-03 NOTE — CARE PLAN
Problem: Safety  Goal: Will remain free from injury    Intervention: Provide assistance with mobility   03/02/19 2010   OTHER   Assistance / Tolerance Standby Assist         Problem: Respiratory:  Goal: Respiratory status will improve    Intervention: Educate and encourage incentive spirometry usage  Educated pt along with RT to use the IS continuously.

## 2019-03-03 NOTE — OP REPORT
DATE OF SERVICE:  03/03/2019    PREOPERATIVE DIAGNOSES:  1.  Left flail chest.  2.  Retained hemothorax.    POSTOPERATIVE DIAGNOSES:  1.  Left flail chest.  2.  Retained hemothorax.    PROCEDURE PERFORMED:  Thoracoscopy with evacuation of retained hemothorax.    SURGEON:  Mike Barragan MD    ASSISTANT:  JENNY Alvarez    ANESTHESIOLOGIST:  Juan José Guardado MD    ANESTHESIA:  General with a double lumen tube.     SPECIMENS:  None.    BLOOD LOSS:  Less than 50 mL    FINDINGS:  Approximately 600 mL of old blood and clot evacuated.  I was able   to see a portion of the left 4th rib displaced posteriorly into the anterior   portion of the lung.  This lung was removed from this.  Again, this was under   the pectoralis major muscle, not option for plating.    COMPLICATIONS:  None.    DRAINS:  A 19-[place] Chencho drain placed inferiorly and posteriorly.    DESCRIPTION OF PROCEDURE:  With the patient in supine position, general   anesthesia was administered, and double lumen tube was placed by anesthesia.    Patient was placed in lateral decubitus, left side up, beanbag, axillary roll,   appropriately positioned and padded.  The previous left chest tube was   removed.  The area was shaved.  Chest prepped with ChloraPrep and widely   draped.  Local anesthesia infiltrated along the previous chest tube site.  A   10 mm Thoracoport was inserted through this.  Scope was inserted.  I was able   to see an open area going superiorly.  Second port was placed posteriorly at   the mid scapular line.  Local anesthesia infiltrated, skin incised with   scalpel.  Clamp inserted and spread.  10 mm port was inserted.  The scope was   advanced through this, was able to now see clearly an area.  The suction    was advanced through the anterior port and with this, I was able to   maneuver the lung, evacuate the clot and fluid, approximately 600 mL of old   material was evacuated.  Irrigation was then used.  I was able to clearly    visualize the diaphragm.  Posterior lung was moving more anterior.  There was   a section of the lung was adherent anteriorly and just gentle pressure was   used to move this from the anterior chest and at this was able to clearly   visualize a segment of the rib on palpation above the area of the nipple,   approximately the 4th rib displaced posteriorly into lung, the lung was freed   from this.  Area was irrigated.  Again, there was a small area of air leak   associated with this.  The area was irrigated.  Again, there was some ooze in   the bone end, but no other obvious area of bleeding and air leak from the lung   itself was irrigated.  There was no evidence of bleeding from this area.  At   this time, a 19-[place] Chencho drain was placed through a separate stab wound   inferiorly and mid axillary line.  Chest tube was directed along the diaphragm   more posteriorly than superiorly, secured to skin with 0 nylon suture.  The   Thoracoports were removed.  Port sites were reapproximated with 2-0 Vicryl   sutures.  Skin was reapproximated with 4-0 Vicryl subcuticular sutures.  Areas   were cleaned and dried.  Benzoin and Steri-Strips applied followed by gauze   and Tegaderm.  Patient was turned back to supine position, extubated by   anesthesia in the OR, brought to recovery room.  Plan will be to admit him   back to floor.       ____________________________________     MD ALISA HORN / MAHSA    DD:  03/03/2019 13:05:25  DT:  03/03/2019 14:05:48    D#:  [id number]  Job#:  [id number]

## 2019-03-03 NOTE — FLOWSHEET NOTE
Respiratory Therapy Update    Interdisciplinary Plan of Care-Goals (Indications)  Hyperinflation Protocol Indications: Chest Trauma (Blunt, Penetrative, or Surgical);Atelectasis Documented by Chest X-Ray (03/02/19 1605)  Interdisciplinary Plan of Care-Outcomes   Hyperinflation Protocol Goals/Outcome: Absences of or Improvement in Signs of Atelectasis;Greater Than 60% of Predicted I.S. Volume x 24 hrs;Stable Vital Capacity x24 hrs and Patient Understands / uses I.S. (03/02/19 1605)    #PEP/CPT (Manual) Initial: Initial (02/27/19 2023)          Cough: Non Productive;Splinted (03/02/19 1605)                           O2 (LPM): 3 (03/02/19 1605)  O2 Daily Delivery Respiratory : OxyMask (03/02/19 1605)    Breath Sounds  Pre/Post Intervention: Pre Intervention Assessment (03/01/19 2006)  RUL Breath Sounds: Clear (03/02/19 1605)  RML Breath Sounds: Clear (03/02/19 1605)  RLL Breath Sounds: Clear;Diminished (03/02/19 1605)  FATIMAH Breath Sounds: Diminished (03/02/19 1605)  LLL Breath Sounds: Diminished (03/02/19 1605)        Events/Summary/Plan: IS done with good effort. (03/02/19 1605)

## 2019-03-03 NOTE — PROGRESS NOTES
Patient complaining of increased pain level and SOB.  Pt on 4L oxymask and SaO2 ranging in the mid to high 90s. Pt pulse was 87 when assessing the patient. Pt pulses bilaterally felt regular 2+. Patient vital signs WDL as seen on the flow sheet.   Pt chest tube has no presence of air leak and is patent. There is no signs of subcutaneous emphysema or flail chest.  Pt medicated with PRN medications as seen on the MAR.  Pt educated on chest tubes and expectations.  Had second nurse assess.

## 2019-03-03 NOTE — OR SURGEON
Immediate Post OP Note    PreOp Diagnosis: Left flail chest / retained hemothorax    PostOp Diagnosis: same    Procedure(s):  THORACOSCOPY - Wound Class: Clean Contaminated    Surgeon(s):  Mike Barragan M.D.    Anesthesiologist/Type of Anesthesia:  Anesthesiologist: Juan José Guardado M.D./General    Surgical Staff:  Circulator: Tequila Lynn R.N.  Scrub Person: Ramesh PALACIOS Donor  First Assist: MILY BuchananPBENJAMIN    Specimens removed if any:  * No specimens in log *    Estimated Blood Loss: < 50 cc     Findings: ~ 600 cc old blood and clot evacuated / left 4 rib displaced posteriorly into anterior lung    Complications: none    Drain: 19 fr Chencho drain        3/3/2019 12:57 PM Mike Barragan M.D.

## 2019-03-03 NOTE — THERAPY
"Physical Therapy Evaluation completed.   Bed Mobility:  Supine to Sit: Minimal Assist (slightly raised HOB/use of railing; via log rolling)  Transfers: Sit to Stand: Contact Guard Assist (no device)  Gait: Level Of Assist: Stand by Assist with No Equipment Needed       Plan of Care: Will benefit from Physical Therapy 2 times per week  Discharge Recommendations: Equipment: No Equipment Needed     Pt presents with impaired activity tolerance, pain and respiratory mechanics s/p 15foot fall resulting in 1-11th rib fx, inferior sternal fractures and left hemopneuothorax requiring chest tube. Pt is most limited by acute on chronic pain, discussed at length segemental breathing into left lower lobe, relative sternal precautions/splinted coughing/bracing for airway clearance as well as circulatory exercises to reduce clot and PNA risk; pt very receptive and active at baseline, would like to return home when medically appropriate to do so; from a PT perspective, pending uncomplicated medical course, anticipate home d/c with probable no PT needs; has extensive home support, will follow to progress bed mobility and update recommendations.       See \"Rehab Therapy-Acute\" Patient Summary Report for complete documentation.     "

## 2019-03-03 NOTE — CARE PLAN
Problem: Pain Management  Goal: Pain level will decrease to patient's comfort goal  Outcome: PROGRESSING AS EXPECTED  Patient having complaints of pain 9/10, medicated per MAR.      Problem: Safety  Goal: Will remain free from injury  Outcome: PROGRESSING AS EXPECTED  Patient educated to call for assistance before getting out of bed.  Call light within reach.  Bed in lowest position.

## 2019-03-03 NOTE — PROGRESS NOTES
Received report from evening RN.  Assumed care of patient.  Patient A&Ox4.  C/O pain 9/10, medicated per MAR.  No complaints of nausea or vomiting.  No numbness/tingling.  NPO for surgery.  NO BM.  +void.  Left sided bruising around chest tube site.  LUE strength 4/5.  Pain with movement.  Left sided chest tube in place, dressing intact.  Sanguinous drainage.  Plan of care discussed.  Call light within reach.  Bed in lowest position.

## 2019-03-03 NOTE — OR NURSING
Medicated as ordered for pain. Tolerating po without nausea. Stat PCXR done while Dr Barragan at bedside. Chest tube connections secured. No subq air or air leak noted. Extensive bruising noted to left chest.

## 2019-03-04 ENCOUNTER — APPOINTMENT (OUTPATIENT)
Dept: RADIOLOGY | Facility: MEDICAL CENTER | Age: 59
DRG: 166 | End: 2019-03-04
Attending: SURGERY
Payer: COMMERCIAL

## 2019-03-04 PROCEDURE — 700102 HCHG RX REV CODE 250 W/ 637 OVERRIDE(OP): Performed by: SURGERY

## 2019-03-04 PROCEDURE — 700111 HCHG RX REV CODE 636 W/ 250 OVERRIDE (IP): Performed by: SURGERY

## 2019-03-04 PROCEDURE — 71045 X-RAY EXAM CHEST 1 VIEW: CPT

## 2019-03-04 PROCEDURE — 700112 HCHG RX REV CODE 229: Performed by: SURGERY

## 2019-03-04 PROCEDURE — A9270 NON-COVERED ITEM OR SERVICE: HCPCS | Performed by: SURGERY

## 2019-03-04 PROCEDURE — 700101 HCHG RX REV CODE 250: Performed by: SURGERY

## 2019-03-04 PROCEDURE — 700102 HCHG RX REV CODE 250 W/ 637 OVERRIDE(OP): Performed by: NURSE PRACTITIONER

## 2019-03-04 PROCEDURE — 770001 HCHG ROOM/CARE - MED/SURG/GYN PRIV*

## 2019-03-04 PROCEDURE — A9270 NON-COVERED ITEM OR SERVICE: HCPCS | Performed by: NURSE PRACTITIONER

## 2019-03-04 RX ORDER — LACTULOSE 20 G/30ML
45 SOLUTION ORAL ONCE
Status: COMPLETED | OUTPATIENT
Start: 2019-03-04 | End: 2019-03-04

## 2019-03-04 RX ADMIN — DOCUSATE SODIUM 100 MG: 100 CAPSULE, LIQUID FILLED ORAL at 17:53

## 2019-03-04 RX ADMIN — OXYCODONE HYDROCHLORIDE 15 MG: 5 TABLET ORAL at 15:33

## 2019-03-04 RX ADMIN — LACTULOSE 45 ML: 20 SOLUTION ORAL at 10:59

## 2019-03-04 RX ADMIN — ENOXAPARIN SODIUM 30 MG: 100 INJECTION SUBCUTANEOUS at 06:03

## 2019-03-04 RX ADMIN — OXYCODONE HYDROCHLORIDE 15 MG: 5 TABLET ORAL at 10:58

## 2019-03-04 RX ADMIN — DIAZEPAM 5 MG: 2 TABLET ORAL at 01:55

## 2019-03-04 RX ADMIN — MAGNESIUM HYDROXIDE 30 ML: 400 SUSPENSION ORAL at 05:58

## 2019-03-04 RX ADMIN — DOCUSATE SODIUM 100 MG: 100 CAPSULE, LIQUID FILLED ORAL at 05:58

## 2019-03-04 RX ADMIN — POLYETHYLENE GLYCOL 3350 1 PACKET: 17 POWDER, FOR SOLUTION ORAL at 17:53

## 2019-03-04 RX ADMIN — GABAPENTIN 300 MG: 300 CAPSULE ORAL at 05:58

## 2019-03-04 RX ADMIN — DIAZEPAM 5 MG: 2 TABLET ORAL at 12:41

## 2019-03-04 RX ADMIN — MORPHINE SULFATE 30 MG: 15 TABLET, EXTENDED RELEASE ORAL at 17:53

## 2019-03-04 RX ADMIN — MORPHINE SULFATE 30 MG: 15 TABLET, EXTENDED RELEASE ORAL at 05:58

## 2019-03-04 RX ADMIN — SENNOSIDES AND DOCUSATE SODIUM 1 TABLET: 8.6; 5 TABLET ORAL at 21:00

## 2019-03-04 RX ADMIN — CELECOXIB 200 MG: 200 CAPSULE ORAL at 17:53

## 2019-03-04 RX ADMIN — ENOXAPARIN SODIUM 30 MG: 100 INJECTION SUBCUTANEOUS at 17:54

## 2019-03-04 RX ADMIN — LIDOCAINE 2 PATCH: 50 PATCH TOPICAL at 18:32

## 2019-03-04 RX ADMIN — GABAPENTIN 300 MG: 300 CAPSULE ORAL at 12:37

## 2019-03-04 RX ADMIN — POLYETHYLENE GLYCOL 3350 1 PACKET: 17 POWDER, FOR SOLUTION ORAL at 05:58

## 2019-03-04 RX ADMIN — GABAPENTIN 300 MG: 300 CAPSULE ORAL at 17:53

## 2019-03-04 RX ADMIN — OXYCODONE HYDROCHLORIDE 15 MG: 5 TABLET ORAL at 01:57

## 2019-03-04 RX ADMIN — OXYCODONE HYDROCHLORIDE 15 MG: 5 TABLET ORAL at 21:00

## 2019-03-04 RX ADMIN — HYDROMORPHONE HYDROCHLORIDE 1 MG: 2 INJECTION INTRAMUSCULAR; INTRAVENOUS; SUBCUTANEOUS at 07:54

## 2019-03-04 RX ADMIN — CELECOXIB 200 MG: 200 CAPSULE ORAL at 05:57

## 2019-03-04 RX ADMIN — HYDROMORPHONE HYDROCHLORIDE 1 MG: 2 INJECTION INTRAMUSCULAR; INTRAVENOUS; SUBCUTANEOUS at 18:00

## 2019-03-04 RX ADMIN — ACETAMINOPHEN 1000 MG: 500 TABLET ORAL at 12:37

## 2019-03-04 RX ADMIN — ACETAMINOPHEN 1000 MG: 500 TABLET ORAL at 05:57

## 2019-03-04 RX ADMIN — OXYCODONE HYDROCHLORIDE 15 MG: 5 TABLET ORAL at 06:40

## 2019-03-04 ASSESSMENT — ENCOUNTER SYMPTOMS
PSYCHIATRIC NEGATIVE: 1
ROS GI COMMENTS: BM PRIOR TO ARRIVAL
NEUROLOGICAL NEGATIVE: 1
SHORTNESS OF BREATH: 1
MYALGIAS: 1

## 2019-03-04 NOTE — PROGRESS NOTES
Assumed care of patient who is AOx4. Pt stated 8 out of 10 pain level. Interventions include: linen change, and repositioning, .   Pt educated on PRN medications available throughout the shift.  The patient denies SOB, nausea, and dizziness.  The patient is voiding adequately in the urinal. Last BM was PTA.  The pt is on a regular diet at this time and is tolerating it adequately.   Pt has a left chest tube place that is to -20 cm suction. The chest tube is patent and no signs of an air leak present.  Pt has no signs of subcutaneous emphysema, flail chest, or increased demand for oxygen.    The patients chest tube dressing is dry and intact, but has old drainage.   Pt educated on pulmonary hygiene and using the IS more frequently  The patient ambulates with standby assistance due to increased pain with ambulation.  Bed in lowest position and locked.  RN Reviewed plan of care with patient, bed in lowest position and locked, pt resting comfortably now, call light within reach, all needs met at this time

## 2019-03-04 NOTE — PROGRESS NOTES
Trauma / Surgical Daily Progress Note    Date of Service  3/4/2019    Chief Complaint  58 y.o. male admitted 2/27/2019 as a trauma red after fall from roof - flail chest  HD # 5  POD # 1 - VATS    Interval Events  CT atrium 450cc - dressing saturated as well   No air leak - continue suction  Ambulated today  Re educated on use of IS   Constipation addressed with Nurse Tita and patient - milk and molasses enema today  Discussed plan, pain control and requirements for discharge including management of CT and drainage.     Review of Systems  Review of Systems   Constitutional: Positive for malaise/fatigue.   HENT: Negative.    Respiratory: Positive for shortness of breath.    Gastrointestinal:        BM prior to arrival    Musculoskeletal: Positive for myalgias.        History of chronic pain    Neurological: Negative.    Psychiatric/Behavioral: Negative.    All other systems reviewed and are negative.       Vital Signs  Temp:  [36.1 °C (97 °F)-37.6 °C (99.6 °F)] 36.4 °C (97.6 °F)  Pulse:  [] 83  Resp:  [13-20] 17  BP: (111-128)/(69-85) 119/74  SpO2:  [90 %-100 %] 98 %    Physical Exam  Physical Exam   Constitutional: He is oriented to person, place, and time. He appears well-developed and well-nourished. He is active and cooperative. No distress.   HENT:   Head: Normocephalic.   Eyes: Conjunctivae are normal.   Neck: No JVD present.   Pulmonary/Chest: Effort normal. He has decreased breath sounds (left ). He exhibits tenderness.   Left chest tube - suction - no air leak - sanguinous    Musculoskeletal: Normal range of motion.   Neurological: He is alert and oriented to person, place, and time. GCS eye subscore is 4. GCS verbal subscore is 5. GCS motor subscore is 6.   Skin: Skin is warm and dry.   Psychiatric: He has a normal mood and affect.   Nursing note and vitals reviewed.      Laboratory  No results found for this or any previous visit (from the past 24 hour(s)).    Fluids    Intake/Output Summary (Last  24 hours) at 03/04/19 1357  Last data filed at 03/04/19 1152   Gross per 24 hour   Intake             1180 ml   Output             1210 ml   Net              -30 ml       Core Measures & Quality Metrics  Labs reviewed, Medications reviewed and Radiology images reviewed  Robbins catheter: No Robbins      DVT Prophylaxis: Enoxaparin (Lovenox)  DVT prophylaxis - mechanical: SCDs  Ulcer prophylaxis: Not indicated    Assessed for rehab: Patient returned to prior level of function, rehabilitation not indicated at this time    Total Score: 5    ETOH Screening  CAGE Score: 1  Intervention complete date: 3/4/2019  Patient response to intervention: Denies substance abuse. Intervention not indicated .         Assessment/Plan  Left pulmonary contusion- (present on admission)   Assessment & Plan    O2 to keep sat > 93%  Aggressive multimodal pain management and pulmonary hygiene.   Serial chest radiographs.       Traumatic pneumothorax and hemothorax- (present on admission)   Assessment & Plan    Left hemopneumothroax on CT  CT placed on arrival to ICU  2/28 CXR with mild interval enlargement of small left apical pneumothorax  3/1 CXR with resolution of left apical pneumothorax.  3/2 CT atrium 1450 cc / 90 cc x 24 hours / no air leak / place to water seal    - CT chest shows retained hemothorax  3/3 Thoracoscopy complete   3/4 CT atrium 450cc / no air leak / continue suction   Serial chest radiographs.     Closed fracture of multiple ribs with flail chest- (present on admission)   Assessment & Plan    Anterior lateral left 1, 2, 3, 4, 5, 6, 7 rib fractures.   Left posterior 6th - 12th  rib fractures.   Disruption of the costochondral cartilage involving the left second, third, fourth, fifth, sixth ribs  Flail anterior lateral chest  Aggressive multimodal pain management and pulmonary hygiene.  Serial chest radiographs.      Closed fracture of sternum- (present on admission)   Assessment & Plan    Nondisplaced inferior sternal  fracture  No retrosternal hematoma  Aggressive multimodal pain management and pulmonary hygiene.   Serial chest radiographs.       Chronic pain- (present on admission)   Assessment & Plan    Daily meds for pain.  MS Contin 15mg BID  Oxy IR 15 mg TID  Resumed as part of the multi-modal regimen  3/2 Increase MS Contin to 30 BID     Discharge planning issues- (present on admission)   Assessment & Plan    Admission date  2/27/2019  Transfer from SICU  3/1/2019  Not discharged: Not medically cleared   Discharged Date      No contraindication to anticoagulation therapy- (present on admission)   Assessment & Plan    2/27 Pharmacological DVT prophylaxis, Lovenox initiated.      H/O clavicle fracture- (present on admission)   Assessment & Plan    Old Left clavicle seen on CT chest.      Trauma- (present on admission)   Assessment & Plan    15 foot fall from roof onto ice chunks.  Trauma Red Activation.    Mike Barragan MD. Trauma Surgery.      Discussed patient condition with RN, Patient and trauma surgery. Dr. Barragan.    Patient seen, data reviewed and discussed.  Agree with assessment and plan.  SERA

## 2019-03-04 NOTE — PROGRESS NOTES
Patient arrived on unit from PACU.  Assumed care of patient.  Patient A&Ox4.  C/O pain 9/10.  No complaints of nausea or vomiting.  Left chest tube to suction, with serosanguinous output.  Serosanguinous drainage on dressing at chest tube site noted.  Plan of care discussed.  Patient educated to call for assistance before getting out of bed.  Call light within reach.

## 2019-03-04 NOTE — CARE PLAN
Problem: Safety  Goal: Will remain free from injury    Intervention: Provide assistance with mobility   03/03/19 2030   OTHER   Assistance / Tolerance Standby Assist

## 2019-03-04 NOTE — PROGRESS NOTES
Report received from night RN, assumed Care.   Patient is AOx4, responds appropriately.      Pain controlled at this time with oral medication and piv breakthrough per MAR.  Up to chair for breakfast with standby assist.  Remains in chair for most of morning.  Patient is tolerating regular diet, denies nausea/vomiting. Denies flatus, no BM since PTA.   Given bowel protocol meds per night RN, will continue to advance.  Abdomen is semifirm. Hyperactive bowel sounds   L CT to suction in place, significant leaking through dressing, dressing changed due to saturation, serosang drainage on sheets.   No air leak noted upon assessment.   Bruising to CT site and sutures in place.  Up standby with steady gait.  IS max pull 3000, strong cough.  2L silicone NC,  in use.     Plan of care discussed, all questions answered.    Explained importance of calling before getting OOB and pt verbalizes understanding.       Call light and belongings within reach, treaded slipper socks on, SCD refused at this time, bed in lowest locked position.  Hourly rounding in place, all needs met at this time

## 2019-03-05 ENCOUNTER — APPOINTMENT (OUTPATIENT)
Dept: RADIOLOGY | Facility: MEDICAL CENTER | Age: 59
DRG: 166 | End: 2019-03-05
Attending: SURGERY
Payer: COMMERCIAL

## 2019-03-05 LAB
BASOPHILS # BLD AUTO: 0.7 % (ref 0–1.8)
BASOPHILS # BLD: 0.05 K/UL (ref 0–0.12)
EOSINOPHIL # BLD AUTO: 0.39 K/UL (ref 0–0.51)
EOSINOPHIL NFR BLD: 5.2 % (ref 0–6.9)
ERYTHROCYTE [DISTWIDTH] IN BLOOD BY AUTOMATED COUNT: 46.2 FL (ref 35.9–50)
HCT VFR BLD AUTO: 31.4 % (ref 42–52)
HGB BLD-MCNC: 10.6 G/DL (ref 14–18)
IMM GRANULOCYTES # BLD AUTO: 0.03 K/UL (ref 0–0.11)
IMM GRANULOCYTES NFR BLD AUTO: 0.4 % (ref 0–0.9)
LYMPHOCYTES # BLD AUTO: 1.35 K/UL (ref 1–4.8)
LYMPHOCYTES NFR BLD: 17.9 % (ref 22–41)
MCH RBC QN AUTO: 32.5 PG (ref 27–33)
MCHC RBC AUTO-ENTMCNC: 33.8 G/DL (ref 33.7–35.3)
MCV RBC AUTO: 96.3 FL (ref 81.4–97.8)
MONOCYTES # BLD AUTO: 0.7 K/UL (ref 0–0.85)
MONOCYTES NFR BLD AUTO: 9.3 % (ref 0–13.4)
NEUTROPHILS # BLD AUTO: 5.04 K/UL (ref 1.82–7.42)
NEUTROPHILS NFR BLD: 66.5 % (ref 44–72)
NRBC # BLD AUTO: 0 K/UL
NRBC BLD-RTO: 0 /100 WBC
PLATELET # BLD AUTO: 180 K/UL (ref 164–446)
PMV BLD AUTO: 9.1 FL (ref 9–12.9)
RBC # BLD AUTO: 3.26 M/UL (ref 4.7–6.1)
WBC # BLD AUTO: 7.6 K/UL (ref 4.8–10.8)

## 2019-03-05 PROCEDURE — A9270 NON-COVERED ITEM OR SERVICE: HCPCS | Performed by: SURGERY

## 2019-03-05 PROCEDURE — 700111 HCHG RX REV CODE 636 W/ 250 OVERRIDE (IP): Performed by: SURGERY

## 2019-03-05 PROCEDURE — 700102 HCHG RX REV CODE 250 W/ 637 OVERRIDE(OP): Performed by: NURSE PRACTITIONER

## 2019-03-05 PROCEDURE — 71045 X-RAY EXAM CHEST 1 VIEW: CPT

## 2019-03-05 PROCEDURE — 700101 HCHG RX REV CODE 250: Performed by: SURGERY

## 2019-03-05 PROCEDURE — 36415 COLL VENOUS BLD VENIPUNCTURE: CPT

## 2019-03-05 PROCEDURE — 770001 HCHG ROOM/CARE - MED/SURG/GYN PRIV*

## 2019-03-05 PROCEDURE — 85025 COMPLETE CBC W/AUTO DIFF WBC: CPT

## 2019-03-05 PROCEDURE — 700102 HCHG RX REV CODE 250 W/ 637 OVERRIDE(OP): Performed by: SURGERY

## 2019-03-05 PROCEDURE — 700112 HCHG RX REV CODE 229: Performed by: SURGERY

## 2019-03-05 PROCEDURE — A9270 NON-COVERED ITEM OR SERVICE: HCPCS | Performed by: NURSE PRACTITIONER

## 2019-03-05 RX ORDER — MORPHINE SULFATE 15 MG/1
45 TABLET, FILM COATED, EXTENDED RELEASE ORAL EVERY 12 HOURS
Status: DISCONTINUED | OUTPATIENT
Start: 2019-03-05 | End: 2019-03-12 | Stop reason: HOSPADM

## 2019-03-05 RX ADMIN — POLYETHYLENE GLYCOL 3350 1 PACKET: 17 POWDER, FOR SOLUTION ORAL at 06:00

## 2019-03-05 RX ADMIN — HYDROMORPHONE HYDROCHLORIDE 1 MG: 2 INJECTION INTRAMUSCULAR; INTRAVENOUS; SUBCUTANEOUS at 03:31

## 2019-03-05 RX ADMIN — GABAPENTIN 300 MG: 300 CAPSULE ORAL at 05:46

## 2019-03-05 RX ADMIN — DOCUSATE SODIUM 100 MG: 100 CAPSULE, LIQUID FILLED ORAL at 17:34

## 2019-03-05 RX ADMIN — DIAZEPAM 5 MG: 2 TABLET ORAL at 11:50

## 2019-03-05 RX ADMIN — OXYCODONE HYDROCHLORIDE 15 MG: 5 TABLET ORAL at 02:21

## 2019-03-05 RX ADMIN — HYDROMORPHONE HYDROCHLORIDE 1 MG: 2 INJECTION INTRAMUSCULAR; INTRAVENOUS; SUBCUTANEOUS at 08:36

## 2019-03-05 RX ADMIN — HYDROMORPHONE HYDROCHLORIDE 1 MG: 2 INJECTION INTRAMUSCULAR; INTRAVENOUS; SUBCUTANEOUS at 17:33

## 2019-03-05 RX ADMIN — LIDOCAINE 2 PATCH: 50 PATCH TOPICAL at 18:37

## 2019-03-05 RX ADMIN — SENNOSIDES AND DOCUSATE SODIUM 1 TABLET: 8.6; 5 TABLET ORAL at 20:49

## 2019-03-05 RX ADMIN — OXYCODONE HYDROCHLORIDE 15 MG: 5 TABLET ORAL at 14:47

## 2019-03-05 RX ADMIN — CELECOXIB 200 MG: 200 CAPSULE ORAL at 05:46

## 2019-03-05 RX ADMIN — GABAPENTIN 300 MG: 300 CAPSULE ORAL at 17:34

## 2019-03-05 RX ADMIN — GABAPENTIN 300 MG: 300 CAPSULE ORAL at 11:50

## 2019-03-05 RX ADMIN — ENOXAPARIN SODIUM 30 MG: 100 INJECTION SUBCUTANEOUS at 05:53

## 2019-03-05 RX ADMIN — ENOXAPARIN SODIUM 30 MG: 100 INJECTION SUBCUTANEOUS at 17:35

## 2019-03-05 RX ADMIN — DIAZEPAM 5 MG: 2 TABLET ORAL at 22:49

## 2019-03-05 RX ADMIN — MORPHINE SULFATE 30 MG: 15 TABLET, EXTENDED RELEASE ORAL at 05:46

## 2019-03-05 RX ADMIN — OXYCODONE HYDROCHLORIDE 15 MG: 5 TABLET ORAL at 20:49

## 2019-03-05 RX ADMIN — OXYCODONE HYDROCHLORIDE 15 MG: 5 TABLET ORAL at 10:00

## 2019-03-05 RX ADMIN — MORPHINE SULFATE 45 MG: 15 TABLET, EXTENDED RELEASE ORAL at 17:34

## 2019-03-05 RX ADMIN — DOCUSATE SODIUM 100 MG: 100 CAPSULE, LIQUID FILLED ORAL at 05:46

## 2019-03-05 RX ADMIN — CELECOXIB 200 MG: 200 CAPSULE ORAL at 17:34

## 2019-03-05 RX ADMIN — DIAZEPAM 5 MG: 2 TABLET ORAL at 18:37

## 2019-03-05 RX ADMIN — DIAZEPAM 5 MG: 2 TABLET ORAL at 06:36

## 2019-03-05 RX ADMIN — OXYCODONE HYDROCHLORIDE 15 MG: 5 TABLET ORAL at 05:46

## 2019-03-05 RX ADMIN — POLYETHYLENE GLYCOL 3350 1 PACKET: 17 POWDER, FOR SOLUTION ORAL at 17:34

## 2019-03-05 ASSESSMENT — ENCOUNTER SYMPTOMS
SHORTNESS OF BREATH: 1
MYALGIAS: 1
NEUROLOGICAL NEGATIVE: 1
PSYCHIATRIC NEGATIVE: 1

## 2019-03-05 NOTE — PROGRESS NOTES
Trauma / Surgical Daily Progress Note    Date of Service  3/5/2019    Chief Complaint  58 y.o. male admitted 2/27/2019 as a trauma red after fall from roof - flail chest  HD # 6  POD # 2 - VATS    Interval Events  CT output 90 cc - dressings continues to saturate per nursing - continue suction  Hb 10.6  Marginal pain control - MS contin increased to 45 mg q 12  Constipation resolved  Mobilizing     Review of Systems  Review of Systems   Constitutional: Positive for malaise/fatigue.   HENT: Negative.    Respiratory: Positive for shortness of breath.    Gastrointestinal:        BM 3/4   Musculoskeletal: Positive for myalgias.        History of chronic pain    Neurological: Negative.    Psychiatric/Behavioral: Negative.    All other systems reviewed and are negative.       Vital Signs  Temp:  [36.2 °C (97.2 °F)-36.7 °C (98.1 °F)] 36.7 °C (98.1 °F)  Pulse:  [59-89] 59  Resp:  [16-18] 18  BP: (101-130)/(62-75) 130/62  SpO2:  [94 %-98 %] 96 %    Physical Exam  Physical Exam   Constitutional: He is oriented to person, place, and time. He appears well-developed and well-nourished. He is active and cooperative. No distress.   HENT:   Head: Normocephalic.   Eyes: Conjunctivae are normal.   Neck: No JVD present.   Pulmonary/Chest: Effort normal. He has decreased breath sounds (left ). He exhibits tenderness.   Left chest tube - suction - no air leak - sanguinous    Musculoskeletal: Normal range of motion.   Neurological: He is alert and oriented to person, place, and time. GCS eye subscore is 4. GCS verbal subscore is 5. GCS motor subscore is 6.   Skin: Skin is warm and dry.   Psychiatric: He has a normal mood and affect.   Nursing note and vitals reviewed.      Laboratory  Recent Results (from the past 24 hour(s))   CBC WITH DIFFERENTIAL    Collection Time: 03/05/19  3:30 AM   Result Value Ref Range    WBC 7.6 4.8 - 10.8 K/uL    RBC 3.26 (L) 4.70 - 6.10 M/uL    Hemoglobin 10.6 (L) 14.0 - 18.0 g/dL    Hematocrit 31.4 (L) 42.0  - 52.0 %    MCV 96.3 81.4 - 97.8 fL    MCH 32.5 27.0 - 33.0 pg    MCHC 33.8 33.7 - 35.3 g/dL    RDW 46.2 35.9 - 50.0 fL    Platelet Count 180 164 - 446 K/uL    MPV 9.1 9.0 - 12.9 fL    Neutrophils-Polys 66.50 44.00 - 72.00 %    Lymphocytes 17.90 (L) 22.00 - 41.00 %    Monocytes 9.30 0.00 - 13.40 %    Eosinophils 5.20 0.00 - 6.90 %    Basophils 0.70 0.00 - 1.80 %    Immature Granulocytes 0.40 0.00 - 0.90 %    Nucleated RBC 0.00 /100 WBC    Neutrophils (Absolute) 5.04 1.82 - 7.42 K/uL    Lymphs (Absolute) 1.35 1.00 - 4.80 K/uL    Monos (Absolute) 0.70 0.00 - 0.85 K/uL    Eos (Absolute) 0.39 0.00 - 0.51 K/uL    Baso (Absolute) 0.05 0.00 - 0.12 K/uL    Immature Granulocytes (abs) 0.03 0.00 - 0.11 K/uL    NRBC (Absolute) 0.00 K/uL       Fluids    Intake/Output Summary (Last 24 hours) at 03/05/19 1342  Last data filed at 03/05/19 1300   Gross per 24 hour   Intake              840 ml   Output             1555 ml   Net             -715 ml       Core Measures & Quality Metrics  Labs reviewed, Medications reviewed and Radiology images reviewed  Robbins catheter: No Robbins      DVT Prophylaxis: Enoxaparin (Lovenox)  DVT prophylaxis - mechanical: SCDs  Ulcer prophylaxis: Not indicated    Assessed for rehab: Patient returned to prior level of function, rehabilitation not indicated at this time    Total Score: 5    ETOH Screening  CAGE Score: 1  Intervention complete date: 3/4/2019  Patient response to intervention: Denies substance abuse. Intervention not indicated .         Assessment/Plan  Left pulmonary contusion- (present on admission)   Assessment & Plan    O2 to keep sat > 93%  Aggressive multimodal pain management and pulmonary hygiene.   Serial chest radiographs.       Traumatic pneumothorax and hemothorax- (present on admission)   Assessment & Plan    Left hemopneumothroax on CT  CT placed on arrival to ICU  2/28 CXR with mild interval enlargement of small left apical pneumothorax  3/1 CXR with resolution of left apical  pneumothorax.  3/2 CT atrium 1450 cc / 90 cc x 24 hours / no air leak / place to water seal    - CT chest shows retained hemothorax  3/3 Thoracoscopy complete   3/5 CT at[facility] 540 cc / 90 cc 24 hours / no air leak / continue suction    - dressing saturating   Serial chest radiographs.     Closed fracture of multiple ribs with flail chest- (present on admission)   Assessment & Plan    Anterior lateral left 1, 2, 3, 4, 5, 6, 7 rib fractures.   Left posterior 6th - 12th  rib fractures.   Disruption of the costochondral cartilage involving the left second, third, fourth, fifth, sixth ribs  Flail anterior lateral chest  Aggressive multimodal pain management and pulmonary hygiene.  Serial chest radiographs.      Closed fracture of sternum- (present on admission)   Assessment & Plan    Nondisplaced inferior sternal fracture  No retrosternal hematoma  Aggressive multimodal pain management and pulmonary hygiene.   Serial chest radiographs.       Chronic pain- (present on admission)   Assessment & Plan    Daily meds for pain.  MS Contin 15mg BID  Oxy IR 15 mg TID  Resumed as part of the multi-modal regimen  3/2 Increase MS Contin to 30 BID  3/5 Increased MS Contin to 45 BID     Discharge planning issues- (present on admission)   Assessment & Plan    Admission date  2/27/2019  Transfer from SICU  3/1/2019  Not discharged: Not medically cleared   Discharged Date      No contraindication to anticoagulation therapy- (present on admission)   Assessment & Plan    2/27 Pharmacological DVT prophylaxis, Lovenox initiated.      H/O clavicle fracture- (present on admission)   Assessment & Plan    Old Left clavicle seen on CT chest.      Trauma- (present on admission)   Assessment & Plan    15 foot fall from roof onto ice chunks.  Trauma Red Activation.    Mike Barragan MD. Trauma Surgery.      Discussed patient condition with RN, Patient and trauma surgery. Dr. Barragan.    Patient seen, data reviewed and discussed.  Agree with  assessment and plan.  SERA

## 2019-03-05 NOTE — DISCHARGE PLANNING
Follow up chart review for rehab. Current documentation does not support therapy need for IRF level of care.

## 2019-03-05 NOTE — PROGRESS NOTES
Pt aox 4. No visible signs of distress. VSS.  Tolerating medication regimen without any signs or symptoms of adverse reactions.  Pt reports 8-9/10 pain, medicated per MAR.  Tolerating diet without any n/v. +BS, +BM  Ambulatory standby assist. Calls appropriately.  Chest tube dressing is CDI. Patent and draining to continuous wall suction.  On 2L via NC, no complaints of SOB.  Bed locked and in low position.  Call light within reach and able to make all needs be known.  Will continue to monitor.

## 2019-03-06 ENCOUNTER — APPOINTMENT (OUTPATIENT)
Dept: RADIOLOGY | Facility: MEDICAL CENTER | Age: 59
DRG: 166 | End: 2019-03-06
Attending: SURGERY
Payer: COMMERCIAL

## 2019-03-06 LAB
BASOPHILS # BLD AUTO: 0.7 % (ref 0–1.8)
BASOPHILS # BLD: 0.04 K/UL (ref 0–0.12)
EOSINOPHIL # BLD AUTO: 0.32 K/UL (ref 0–0.51)
EOSINOPHIL NFR BLD: 5.5 % (ref 0–6.9)
ERYTHROCYTE [DISTWIDTH] IN BLOOD BY AUTOMATED COUNT: 45.7 FL (ref 35.9–50)
HCT VFR BLD AUTO: 32.8 % (ref 42–52)
HGB BLD-MCNC: 11 G/DL (ref 14–18)
IMM GRANULOCYTES # BLD AUTO: 0.05 K/UL (ref 0–0.11)
IMM GRANULOCYTES NFR BLD AUTO: 0.9 % (ref 0–0.9)
LYMPHOCYTES # BLD AUTO: 1.28 K/UL (ref 1–4.8)
LYMPHOCYTES NFR BLD: 22.1 % (ref 22–41)
MCH RBC QN AUTO: 32.3 PG (ref 27–33)
MCHC RBC AUTO-ENTMCNC: 33.5 G/DL (ref 33.7–35.3)
MCV RBC AUTO: 96.2 FL (ref 81.4–97.8)
MONOCYTES # BLD AUTO: 0.72 K/UL (ref 0–0.85)
MONOCYTES NFR BLD AUTO: 12.4 % (ref 0–13.4)
NEUTROPHILS # BLD AUTO: 3.39 K/UL (ref 1.82–7.42)
NEUTROPHILS NFR BLD: 58.4 % (ref 44–72)
NRBC # BLD AUTO: 0 K/UL
NRBC BLD-RTO: 0 /100 WBC
PLATELET # BLD AUTO: 166 K/UL (ref 164–446)
PMV BLD AUTO: 8.9 FL (ref 9–12.9)
RBC # BLD AUTO: 3.41 M/UL (ref 4.7–6.1)
WBC # BLD AUTO: 5.8 K/UL (ref 4.8–10.8)

## 2019-03-06 PROCEDURE — A9270 NON-COVERED ITEM OR SERVICE: HCPCS | Performed by: SURGERY

## 2019-03-06 PROCEDURE — 71045 X-RAY EXAM CHEST 1 VIEW: CPT

## 2019-03-06 PROCEDURE — 700102 HCHG RX REV CODE 250 W/ 637 OVERRIDE(OP): Performed by: SURGERY

## 2019-03-06 PROCEDURE — 36415 COLL VENOUS BLD VENIPUNCTURE: CPT

## 2019-03-06 PROCEDURE — 700111 HCHG RX REV CODE 636 W/ 250 OVERRIDE (IP): Performed by: SURGERY

## 2019-03-06 PROCEDURE — 770001 HCHG ROOM/CARE - MED/SURG/GYN PRIV*

## 2019-03-06 PROCEDURE — 700112 HCHG RX REV CODE 229: Performed by: SURGERY

## 2019-03-06 PROCEDURE — 85025 COMPLETE CBC W/AUTO DIFF WBC: CPT

## 2019-03-06 PROCEDURE — 700101 HCHG RX REV CODE 250: Performed by: NURSE PRACTITIONER

## 2019-03-06 RX ORDER — LIDOCAINE 50 MG/G
3 PATCH TOPICAL EVERY 24 HOURS
Status: DISCONTINUED | OUTPATIENT
Start: 2019-03-06 | End: 2019-03-12 | Stop reason: HOSPADM

## 2019-03-06 RX ORDER — HYDROMORPHONE HYDROCHLORIDE 2 MG/ML
.5-1 INJECTION, SOLUTION INTRAMUSCULAR; INTRAVENOUS; SUBCUTANEOUS EVERY 6 HOURS PRN
Status: DISCONTINUED | OUTPATIENT
Start: 2019-03-06 | End: 2019-03-08

## 2019-03-06 RX ADMIN — MAGNESIUM HYDROXIDE 30 ML: 400 SUSPENSION ORAL at 04:51

## 2019-03-06 RX ADMIN — OXYCODONE HYDROCHLORIDE 15 MG: 5 TABLET ORAL at 07:18

## 2019-03-06 RX ADMIN — POLYETHYLENE GLYCOL 3350 1 PACKET: 17 POWDER, FOR SOLUTION ORAL at 04:51

## 2019-03-06 RX ADMIN — OXYCODONE HYDROCHLORIDE 15 MG: 5 TABLET ORAL at 11:52

## 2019-03-06 RX ADMIN — OXYCODONE HYDROCHLORIDE 15 MG: 5 TABLET ORAL at 03:11

## 2019-03-06 RX ADMIN — DIAZEPAM 5 MG: 2 TABLET ORAL at 04:51

## 2019-03-06 RX ADMIN — OXYCODONE HYDROCHLORIDE 15 MG: 5 TABLET ORAL at 15:07

## 2019-03-06 RX ADMIN — DOCUSATE SODIUM 100 MG: 100 CAPSULE, LIQUID FILLED ORAL at 04:51

## 2019-03-06 RX ADMIN — MORPHINE SULFATE 45 MG: 15 TABLET, EXTENDED RELEASE ORAL at 16:54

## 2019-03-06 RX ADMIN — LIDOCAINE 3 PATCH: 50 PATCH TOPICAL at 18:52

## 2019-03-06 RX ADMIN — ENOXAPARIN SODIUM 30 MG: 100 INJECTION SUBCUTANEOUS at 16:54

## 2019-03-06 RX ADMIN — ENOXAPARIN SODIUM 30 MG: 100 INJECTION SUBCUTANEOUS at 04:52

## 2019-03-06 RX ADMIN — OXYCODONE HYDROCHLORIDE 15 MG: 5 TABLET ORAL at 22:19

## 2019-03-06 RX ADMIN — CELECOXIB 200 MG: 200 CAPSULE ORAL at 04:52

## 2019-03-06 RX ADMIN — DOCUSATE SODIUM 100 MG: 100 CAPSULE, LIQUID FILLED ORAL at 16:54

## 2019-03-06 RX ADMIN — GABAPENTIN 300 MG: 300 CAPSULE ORAL at 11:50

## 2019-03-06 RX ADMIN — GABAPENTIN 300 MG: 300 CAPSULE ORAL at 04:51

## 2019-03-06 RX ADMIN — OXYCODONE HYDROCHLORIDE 15 MG: 5 TABLET ORAL at 00:04

## 2019-03-06 RX ADMIN — HYDROMORPHONE HYDROCHLORIDE 1 MG: 2 INJECTION INTRAMUSCULAR; INTRAVENOUS; SUBCUTANEOUS at 09:17

## 2019-03-06 RX ADMIN — GABAPENTIN 300 MG: 300 CAPSULE ORAL at 16:54

## 2019-03-06 RX ADMIN — CELECOXIB 200 MG: 200 CAPSULE ORAL at 16:54

## 2019-03-06 RX ADMIN — MORPHINE SULFATE 45 MG: 15 TABLET, EXTENDED RELEASE ORAL at 04:50

## 2019-03-06 RX ADMIN — OXYCODONE HYDROCHLORIDE 15 MG: 5 TABLET ORAL at 18:37

## 2019-03-06 RX ADMIN — SENNOSIDES AND DOCUSATE SODIUM 1 TABLET: 8.6; 5 TABLET ORAL at 20:13

## 2019-03-06 RX ADMIN — HYDROMORPHONE HYDROCHLORIDE 1 MG: 2 INJECTION INTRAMUSCULAR; INTRAVENOUS; SUBCUTANEOUS at 05:59

## 2019-03-06 RX ADMIN — POLYETHYLENE GLYCOL 3350 1 PACKET: 17 POWDER, FOR SOLUTION ORAL at 16:54

## 2019-03-06 ASSESSMENT — ENCOUNTER SYMPTOMS
DIZZINESS: 0
SPEECH CHANGE: 0
MYALGIAS: 1
NAUSEA: 0
SHORTNESS OF BREATH: 1
CHILLS: 0
VOMITING: 0
ABDOMINAL PAIN: 0
CONSTIPATION: 0
FEVER: 0

## 2019-03-06 NOTE — PROGRESS NOTES
Assumed care of patient from night shift RN.   58 year old male  CODE: full  ALLERGIES: NKA  ADMITTED: 2/27 d/t fall  PAIN: 8/10 will medicate per MAR  A&O: 4  RESP: 2 L nc  CARDIAC: WNL, occ tachy  LBM: 3/4  DIET: reg diet  : voiding  PIV: 18 g R AC, 18 g L AC- SL  SKIN: CT to R chest, bruising, scabbing  MOBILITY: standby  FALL RISK: low  PLAN: CT to water seal, ambulate  All questions answered and all needs met a this time. Bed in low, locked position. Call light within reach. Patient educated on importance of calling before getting OOB. RN will implement hourly rounding and answer call lights appropriately.

## 2019-03-06 NOTE — PROGRESS NOTES
Pt aox 4. No visible signs of distress. VSS.  Tolerating medication regimen without any signs or symptoms of adverse reactions.  Pt reports 8-9/10 pain, medicated per MAR.  Tolerating diet without any n/v. +BS, +BM  Ambulatory standby assist. Calls appropriately.  Chest tube dressing changed x2 this shift. Patent and draining to continuous wall suction.  On 2L via NC, no complaints of SOB.  Bed locked and in low position.  Call light within reach and able to make all needs be known.  Will continue to monitor.

## 2019-03-06 NOTE — PROGRESS NOTES
Trauma / Surgical Daily Progress Note    Date of Service  3/6/2019    Chief Complaint  58 y.o. male admitted 2/27/2019 with Trauma  POD # Thoracoscopy with evacuation of retained hemothorax    Interval Events  Feeling a little better today, anxious about pain management appointment scheduled for Friday  CXR stable  Minimal chest tube output, no air leak    - Chest tube to water seal  - Mobilize    Review of Systems  Review of Systems   Constitutional: Positive for malaise/fatigue. Negative for chills and fever.   Respiratory: Positive for shortness of breath.    Cardiovascular: Negative for chest pain.   Gastrointestinal: Negative for abdominal pain, constipation, nausea and vomiting.        BM 3/4   Genitourinary: Negative for dysuria.        Voiding   Musculoskeletal: Positive for myalgias (left chest wall).        History of chronic pain   Neurological: Negative for dizziness and speech change.        Vital Signs  Temp:  [36.2 °C (97.1 °F)-36.7 °C (98.1 °F)] 36.6 °C (97.9 °F)  Pulse:  [59-88] 87  Resp:  [16-18] 16  BP: (106-130)/(62-79) 112/70  SpO2:  [90 %-96 %] 92 %    Physical Exam  Physical Exam   Constitutional: He is oriented to person, place, and time. He appears well-developed. He is active and cooperative. No distress.   HENT:   Head: Normocephalic.   Eyes: Conjunctivae are normal.   Neck: Neck supple.   Cardiovascular: Normal rate.    Pulmonary/Chest: Effort normal. No respiratory distress. He exhibits tenderness (left chest wall).   Left chest tube in place, minimal serosang output, no air leak   Musculoskeletal: Normal range of motion.   Moves all extremities   Neurological: He is alert and oriented to person, place, and time.   Skin: Skin is warm and dry.   Psychiatric: He has a normal mood and affect. His behavior is normal.   Nursing note and vitals reviewed.      Laboratory  Recent Results (from the past 24 hour(s))   CBC WITH DIFFERENTIAL    Collection Time: 03/06/19  3:39 AM   Result Value Ref  Range    WBC 5.8 4.8 - 10.8 K/uL    RBC 3.41 (L) 4.70 - 6.10 M/uL    Hemoglobin 11.0 (L) 14.0 - 18.0 g/dL    Hematocrit 32.8 (L) 42.0 - 52.0 %    MCV 96.2 81.4 - 97.8 fL    MCH 32.3 27.0 - 33.0 pg    MCHC 33.5 (L) 33.7 - 35.3 g/dL    RDW 45.7 35.9 - 50.0 fL    Platelet Count 166 164 - 446 K/uL    MPV 8.9 (L) 9.0 - 12.9 fL    Neutrophils-Polys 58.40 44.00 - 72.00 %    Lymphocytes 22.10 22.00 - 41.00 %    Monocytes 12.40 0.00 - 13.40 %    Eosinophils 5.50 0.00 - 6.90 %    Basophils 0.70 0.00 - 1.80 %    Immature Granulocytes 0.90 0.00 - 0.90 %    Nucleated RBC 0.00 /100 WBC    Neutrophils (Absolute) 3.39 1.82 - 7.42 K/uL    Lymphs (Absolute) 1.28 1.00 - 4.80 K/uL    Monos (Absolute) 0.72 0.00 - 0.85 K/uL    Eos (Absolute) 0.32 0.00 - 0.51 K/uL    Baso (Absolute) 0.04 0.00 - 0.12 K/uL    Immature Granulocytes (abs) 0.05 0.00 - 0.11 K/uL    NRBC (Absolute) 0.00 K/uL       Fluids    Intake/Output Summary (Last 24 hours) at 03/06/19 1022  Last data filed at 03/06/19 0839   Gross per 24 hour   Intake              720 ml   Output             3265 ml   Net            -2545 ml       Core Measures & Quality Metrics  Labs reviewed, Medications reviewed and Radiology images reviewed  Robbins catheter: No Robbins      DVT Prophylaxis: Enoxaparin (Lovenox)  DVT prophylaxis - mechanical: SCDs  Ulcer prophylaxis: Not indicated    Assessed for rehab: Patient returned to prior level of function, rehabilitation not indicated at this time    Total Score: 5    ETOH Screening  CAGE Score: 1  Intervention complete date: 3/4/2019  Patient response to intervention: Denies substance abuse.  Intervention not indicated .   Patient demonstrats understanding of intervention.Plan of care: No further acute intervention.         Assessment/Plan  Left pulmonary contusion- (present on admission)   Assessment & Plan    Supplemental oxygen to maintain SaO2 greater than 93%.  Aggressive pulmonary hygiene and serial chest radiography.     Traumatic  pneumothorax and hemothorax- (present on admission)   Assessment & Plan    Left hemopneumothroax on CT.  Chest tube placed on arrival to ICU.  3/2 CT chest with retained hemothorax.  3/3 Thoracoscopy with evacuation of retained hemothorax.  3/6 CXR stable.  - [facility] 24 hr output 40 ml / total output 590 ml / no air leak / to water seal.     Closed fracture of multiple ribs with flail chest- (present on admission)   Assessment & Plan    Anterior lateral left 1, 2, 3, 4, 5, 6, 7 rib fractures. Left posterior 6th - 12th  rib fractures. Disruption of the costochondral cartilage involving the left second, third, fourth, fifth, sixth ribs. Flail anterior lateral chest.  Aggressive pulmonary hygiene and multimodal pain management.     Closed fracture of sternum- (present on admission)   Assessment & Plan    Nondisplaced inferior sternal fracture. No retrosternal hematoma.  Aggressive pulmonary hygiene and multimodal pain management.     Chronic pain- (present on admission)   Assessment & Plan    Premorbid condition treated with MS Contin 15 mg BID and Oxycodone 15 mg TID.  Home meds resumed.  3/2 Increase MS Contin to 30 BID.  3/5 Increased MS Contin to 45 BID.  Has pain management appointment 3/8.     Discharge planning issues- (present on admission)   Assessment & Plan    Admission date  2/27/2019  Transfer from SICU  3/1/2019  Rehab/SNF consult NA  Medically cleared for discharge NA - chest tube in place  Not discharged: NA  Reasons:  NA  Discharged Date NA     No contraindication to deep vein thrombosis (DVT) prophylaxis- (present on admission)   Assessment & Plan    Chemical DVT prophylaxis (Lovenox) initiated upon admission.  RAP score 5.  Ambulate TID.     H/O clavicle fracture- (present on admission)   Assessment & Plan    Old left clavicle fracture seen on CT chest.     Trauma- (present on admission)   Assessment & Plan    15 foot fall from roof onto ice chunks.  Trauma Red Activation.  Mike Barragan MD.  Trauma Surgery.       Discussed patient condition with RN, , Patient and trauma surgery. Dr. MILKA Barragan    Patient seen, data reviewed and discussed.  Agree with assessment and plan.  SERA

## 2019-03-07 ENCOUNTER — APPOINTMENT (OUTPATIENT)
Dept: RADIOLOGY | Facility: MEDICAL CENTER | Age: 59
DRG: 166 | End: 2019-03-07
Attending: SURGERY
Payer: COMMERCIAL

## 2019-03-07 ENCOUNTER — APPOINTMENT (OUTPATIENT)
Dept: RADIOLOGY | Facility: MEDICAL CENTER | Age: 59
DRG: 166 | End: 2019-03-07
Attending: NURSE PRACTITIONER
Payer: COMMERCIAL

## 2019-03-07 PROBLEM — I48.91 A-FIB (HCC): Status: ACTIVE | Noted: 2019-03-07

## 2019-03-07 LAB
ALBUMIN SERPL BCP-MCNC: 3.5 G/DL (ref 3.2–4.9)
ALBUMIN/GLOB SERPL: 1.6 G/DL
ALP SERPL-CCNC: 73 U/L (ref 30–99)
ALT SERPL-CCNC: 28 U/L (ref 2–50)
ANION GAP SERPL CALC-SCNC: 7 MMOL/L (ref 0–11.9)
AST SERPL-CCNC: 22 U/L (ref 12–45)
BILIRUB SERPL-MCNC: 0.7 MG/DL (ref 0.1–1.5)
BUN SERPL-MCNC: 13 MG/DL (ref 8–22)
CALCIUM SERPL-MCNC: 8.7 MG/DL (ref 8.5–10.5)
CHLORIDE SERPL-SCNC: 104 MMOL/L (ref 96–112)
CO2 SERPL-SCNC: 23 MMOL/L (ref 20–33)
CREAT SERPL-MCNC: 0.85 MG/DL (ref 0.5–1.4)
GLOBULIN SER CALC-MCNC: 2.2 G/DL (ref 1.9–3.5)
GLUCOSE SERPL-MCNC: 126 MG/DL (ref 65–99)
MAGNESIUM SERPL-MCNC: 2.2 MG/DL (ref 1.5–2.5)
POTASSIUM SERPL-SCNC: 3.9 MMOL/L (ref 3.6–5.5)
PROT SERPL-MCNC: 5.7 G/DL (ref 6–8.2)
SODIUM SERPL-SCNC: 134 MMOL/L (ref 135–145)

## 2019-03-07 PROCEDURE — 71250 CT THORAX DX C-: CPT

## 2019-03-07 PROCEDURE — 93005 ELECTROCARDIOGRAM TRACING: CPT | Performed by: NURSE PRACTITIONER

## 2019-03-07 PROCEDURE — A9270 NON-COVERED ITEM OR SERVICE: HCPCS | Performed by: SURGERY

## 2019-03-07 PROCEDURE — 700102 HCHG RX REV CODE 250 W/ 637 OVERRIDE(OP): Performed by: SURGERY

## 2019-03-07 PROCEDURE — 71045 X-RAY EXAM CHEST 1 VIEW: CPT

## 2019-03-07 PROCEDURE — 83735 ASSAY OF MAGNESIUM: CPT

## 2019-03-07 PROCEDURE — 770022 HCHG ROOM/CARE - ICU (200)

## 2019-03-07 PROCEDURE — 700112 HCHG RX REV CODE 229: Performed by: SURGERY

## 2019-03-07 PROCEDURE — 700111 HCHG RX REV CODE 636 W/ 250 OVERRIDE (IP): Performed by: SURGERY

## 2019-03-07 PROCEDURE — 700105 HCHG RX REV CODE 258: Performed by: NURSE PRACTITIONER

## 2019-03-07 PROCEDURE — 97535 SELF CARE MNGMENT TRAINING: CPT

## 2019-03-07 PROCEDURE — 700101 HCHG RX REV CODE 250: Performed by: NURSE PRACTITIONER

## 2019-03-07 PROCEDURE — 700111 HCHG RX REV CODE 636 W/ 250 OVERRIDE (IP): Performed by: NURSE PRACTITIONER

## 2019-03-07 PROCEDURE — 80053 COMPREHEN METABOLIC PANEL: CPT

## 2019-03-07 PROCEDURE — 97530 THERAPEUTIC ACTIVITIES: CPT

## 2019-03-07 PROCEDURE — C1729 CATH, DRAINAGE: HCPCS | Performed by: INTERNAL MEDICINE

## 2019-03-07 RX ORDER — DEXTROSE MONOHYDRATE 50 MG/ML
INJECTION, SOLUTION INTRAVENOUS CONTINUOUS
Status: DISCONTINUED | OUTPATIENT
Start: 2019-03-07 | End: 2019-03-09

## 2019-03-07 RX ADMIN — HYDROMORPHONE HYDROCHLORIDE 0.5 MG: 2 INJECTION INTRAMUSCULAR; INTRAVENOUS; SUBCUTANEOUS at 23:16

## 2019-03-07 RX ADMIN — GABAPENTIN 300 MG: 300 CAPSULE ORAL at 05:42

## 2019-03-07 RX ADMIN — LIDOCAINE 3 PATCH: 50 PATCH TOPICAL at 18:44

## 2019-03-07 RX ADMIN — GABAPENTIN 300 MG: 300 CAPSULE ORAL at 17:57

## 2019-03-07 RX ADMIN — GABAPENTIN 300 MG: 300 CAPSULE ORAL at 11:53

## 2019-03-07 RX ADMIN — OXYCODONE HYDROCHLORIDE 15 MG: 5 TABLET ORAL at 11:53

## 2019-03-07 RX ADMIN — DEXTROSE MONOHYDRATE: 50 INJECTION, SOLUTION INTRAVENOUS at 23:09

## 2019-03-07 RX ADMIN — CELECOXIB 200 MG: 200 CAPSULE ORAL at 17:57

## 2019-03-07 RX ADMIN — MORPHINE SULFATE 45 MG: 15 TABLET, EXTENDED RELEASE ORAL at 05:42

## 2019-03-07 RX ADMIN — OXYCODONE HYDROCHLORIDE 15 MG: 5 TABLET ORAL at 07:30

## 2019-03-07 RX ADMIN — DIAZEPAM 5 MG: 2 TABLET ORAL at 15:57

## 2019-03-07 RX ADMIN — OXYCODONE HYDROCHLORIDE 15 MG: 5 TABLET ORAL at 02:28

## 2019-03-07 RX ADMIN — AMIODARONE HYDROCHLORIDE 1 MG/MIN: 50 INJECTION, SOLUTION INTRAVENOUS at 23:20

## 2019-03-07 RX ADMIN — POLYETHYLENE GLYCOL 3350 1 PACKET: 17 POWDER, FOR SOLUTION ORAL at 17:57

## 2019-03-07 RX ADMIN — DOCUSATE SODIUM 100 MG: 100 CAPSULE, LIQUID FILLED ORAL at 05:42

## 2019-03-07 RX ADMIN — CELECOXIB 200 MG: 200 CAPSULE ORAL at 05:42

## 2019-03-07 RX ADMIN — AMIODARONE HYDROCHLORIDE 150 MG: 1.5 INJECTION, SOLUTION INTRAVENOUS at 23:03

## 2019-03-07 RX ADMIN — OXYCODONE HYDROCHLORIDE 15 MG: 5 TABLET ORAL at 21:18

## 2019-03-07 RX ADMIN — MORPHINE SULFATE 45 MG: 15 TABLET, EXTENDED RELEASE ORAL at 17:57

## 2019-03-07 RX ADMIN — OXYCODONE HYDROCHLORIDE 15 MG: 5 TABLET ORAL at 17:57

## 2019-03-07 RX ADMIN — DOCUSATE SODIUM 100 MG: 100 CAPSULE, LIQUID FILLED ORAL at 17:57

## 2019-03-07 RX ADMIN — POLYETHYLENE GLYCOL 3350 1 PACKET: 17 POWDER, FOR SOLUTION ORAL at 05:42

## 2019-03-07 RX ADMIN — ENOXAPARIN SODIUM 30 MG: 100 INJECTION SUBCUTANEOUS at 05:41

## 2019-03-07 RX ADMIN — OXYCODONE HYDROCHLORIDE 15 MG: 5 TABLET ORAL at 14:56

## 2019-03-07 RX ADMIN — MAGNESIUM HYDROXIDE 30 ML: 400 SUSPENSION ORAL at 05:42

## 2019-03-07 RX ADMIN — ENOXAPARIN SODIUM 30 MG: 100 INJECTION SUBCUTANEOUS at 17:57

## 2019-03-07 RX ADMIN — SENNOSIDES AND DOCUSATE SODIUM 1 TABLET: 8.6; 5 TABLET ORAL at 21:18

## 2019-03-07 ASSESSMENT — ENCOUNTER SYMPTOMS
NAUSEA: 0
SHORTNESS OF BREATH: 0
MYALGIAS: 1
VOMITING: 0
SPEECH CHANGE: 0
ABDOMINAL PAIN: 0
CONSTIPATION: 0
DIZZINESS: 0
FEVER: 0
CHILLS: 0

## 2019-03-07 ASSESSMENT — COGNITIVE AND FUNCTIONAL STATUS - GENERAL
TURNING FROM BACK TO SIDE WHILE IN FLAT BAD: A LITTLE
MOVING TO AND FROM BED TO CHAIR: A LITTLE
MOBILITY SCORE: 22
SUGGESTED CMS G CODE MODIFIER MOBILITY: CJ

## 2019-03-07 ASSESSMENT — GAIT ASSESSMENTS
DEVIATION: ANTALGIC
GAIT LEVEL OF ASSIST: MODIFIED INDEPENDENT
DISTANCE (FEET): 2000

## 2019-03-07 NOTE — PROGRESS NOTES
Bedside report received.  Assessment complete.  A&O x 4. Patient calls appropriately.  Patient  In bed with no assist. Bed alarm n/a.   Patient has 5/10 pain. Denies pain interventions.  Denies N&V. Tolerating regular diet.  CT to the L, to water seal, no air leak noted.  On 2L NC saturating at 92% during assessment.  + void, - flatus, - BM.  Patient denies SOB.  SCD's refused.  Patient has family at bedside.  Review plan with of care with patient. Call light and personal belongings with in reach. Hourly rounding in place. All needs met at this time.

## 2019-03-07 NOTE — CARE PLAN
Problem: Pain Management  Goal: Pain level will decrease to patient's comfort goal  Outcome: PROGRESSING AS EXPECTED  Using oxycodone 10mg to treat pain as a medication option. Repositioning and ambulation also being used as techniques.    Problem: Communication  Goal: The ability to communicate needs accurately and effectively will improve  Outcome: PROGRESSING AS EXPECTED  Patient communicating effectively with staff about pain, questions, and other concerns with treatment. All questions answered.

## 2019-03-07 NOTE — NON-PROVIDER
Internal Medicine Medical Student Note  Note Author: Harmeet Owens, Student    Name Rasta Saunders     1960   Age/Sex 58 y.o. male   MRN [id number]   Code Status Full     Reason for interval visit  (Principal Problem)   57 y/o male admitted on 19 with trauma due to 15 foot fall from rooftop. Day 8 of chest tube for left hemopneumothorax and left flail chest. Pt was found to have rib fractures with flail chest and a non-displaced sternum fracture.    Interval Problem Daily Status Update  (problem status, last 24 hours, new history, new data )   No acute overnight events  -Pain is currently an 8/10 while using IS but pt states that he feels that it is adequately controlled  -Patient is ambulating   -Urination and voiding 3/07/19  -Pt is tolerating full diet  -IS is 2500 at bedside  -chest tube to water seal and no air leak  -Xray shows possible fluid accumulation   -Follow up CT without contrast at 1430 19    ROS: Const-negative for f/c   Resp-Pt states he has localized pain while breathing in but denies SOB or worsening pain. Cardio-denies chest pain or palpitations   GI-denies pain, constipation, nausea or vomiting   -denies dysuria or hemoturia  Msk- Chronic multiple joint pain unrelated to trauma. Trauma related left sided chest wall pain  Neuro- negative for dizziness, neuro impairments, or sensory deficits      Physical Exam       Vitals:    19 1711 19 2010 19 0100 19 0545   BP: 127/77 116/70 112/72 121/80   Pulse: 87 91 91 94   Resp: 16 17 16 17   Temp: 36.4 °C (97.5 °F) 36.4 °C (97.6 °F) 36.4 °C (97.5 °F) 36.6 °C (97.8 °F)   TempSrc: Temporal Temporal Temporal Temporal   SpO2: 93% 92% 94% 92%   Weight:       Height:         Body mass index is 29.44 kg/m².    Oxygen Therapy:  Pulse Oximetry: 92 %, O2 (LPM): 2, O2 Delivery: Nasal Cannula    I/O: In +340 PO/ Out - 320 Urine  Total +20    Physical Exam  Gen:AxO4, NAD, pt is very active  HENT: Normocephalic,  "JOJO, no LAD  Cardio: RRR S1 S2 no m/r/g  Resp: normal effort with diminished lung sounds on Left side. Slight crackling in the LLL. Chest tube in place with no air leak. Serosanguinous output. Significant bruising and several small lacerations on left side of chest  Abd:No distention or TTP  MSK: Multiple scarring from previous surgeries.  Neuro: No sensorineural deficits      Labs:   Recent Labs      03/05/19   0330  03/06/19   0339   WBC  7.6  5.8   RBC  3.26*  3.41*   HEMOGLOBIN  10.6*  11.0*   HEMATOCRIT  31.4*  32.8*   MCV  96.3  96.2   MCH  32.5  32.3   RDW  46.2  45.7   PLATELETCT  180  166   MPV  9.1  8.9*   NEUTSPOLYS  66.50  58.40   LYMPHOCYTES  17.90*  22.10   MONOCYTES  9.30  12.40   EOSINOPHILS  5.20  5.50   BASOPHILS  0.70  0.70     Imaging:  Dx Chest Xray 03/07/19: \"Pulmonary infiltrates and layering left pleural effusion, overall stable since prior study. Left rib fractures\"       Assessment/Plan     57 y/o male admitted on 02/27/19 for trauma. Day 8 of chest tube for hemopneumothorax and flail chest. Pt is recovering well and pending chest tube removal and discharge.    Rib Fracture:  -Pt found to have left anterior 1-7 rib fractures. Left posterior 6-12 rib fractures. Disruption of costochondral cartilage on the left 2-6th ribs and a flail left sided anterior lateral chest.  -Aggressive pulmonary hygiene and multimodal pain management.    Hemopneumothorax:  -Left hemopneumothorax on CT 02/27/19  -CXR stable on 03/06/19  -Chest tube has had 40ml 24 output/ 590ml total output/ no air leak / water seal  -Remove chest tube on 03/07/18    Sternum Fracture:  -Nondisplaced inferior sternal fracture on 02/27/19  -Aggressive pulmonary hygeine and multimodal pain management    Left pulmonary contusion  -Maintain O2 >93%  -Aggressive pulmonary hygeine    Chronic Pain  -Chronic pain treated with MS Contin 15mg BID and Oxycodne 15 mg TID  -MS Contin increased to 45 BID on 3/05/19    DVT Prophylaxis:  -Pt is " ambulating > 3x daily    Disposition: Inpatient for chest tube

## 2019-03-07 NOTE — PROGRESS NOTES
Trauma / Surgical Daily Progress Note    Date of Service  3/7/2019    Chief Complaint  58 y.o. male admitted 2/27/2019 with Trauma  POD # 4 Thoracoscopy with evacuation of retained hemothorax    Interval Events  Pt spoke with pain management physician today, concerns addressed  No issues overnight, adequate pain control, denies dyspnea  CXR with worsening left effusion / hemothorax  Chest tube output small, no air leak    - Continue water seal  - CT chest without contrast today    Review of Systems  Review of Systems   Constitutional: Positive for malaise/fatigue. Negative for chills and fever.   Respiratory: Negative for shortness of breath.    Cardiovascular: Negative for chest pain.   Gastrointestinal: Negative for abdominal pain, constipation, nausea and vomiting.        BM 3/4   Genitourinary: Negative for dysuria.        Voiding   Musculoskeletal: Positive for myalgias (left chest wall).        History of chronic pain   Neurological: Negative for dizziness and speech change.        Vital Signs  Temp:  [36.4 °C (97.5 °F)-37.1 °C (98.7 °F)] 37.1 °C (98.7 °F)  Pulse:  [] 102  Resp:  [16-17] 17  BP: (112-141)/(70-92) 141/92  SpO2:  [90 %-94 %] 90 %    Physical Exam  Physical Exam   Constitutional: He is oriented to person, place, and time. He appears well-developed. He is active and cooperative. No distress.   HENT:   Head: Normocephalic.   Neck: Neck supple.   Cardiovascular: Normal rate.    Pulmonary/Chest: Effort normal. No respiratory distress. He exhibits tenderness (left chest wall).   Left chest tube in place, small serosang output, no air leak   Musculoskeletal: Normal range of motion.   Moves all extremities   Neurological: He is alert and oriented to person, place, and time.   Skin: Skin is warm and dry.   Psychiatric: He has a normal mood and affect. His behavior is normal.   Nursing note and vitals reviewed.      Laboratory  No results found for this or any previous visit (from the past 24  hour(s)).    Fluids    Intake/Output Summary (Last 24 hours) at 03/07/19 1015  Last data filed at 03/07/19 0835   Gross per 24 hour   Intake              840 ml   Output             2480 ml   Net            -1640 ml       Core Measures & Quality Metrics  Labs reviewed, Medications reviewed and Radiology images reviewed  Robbins catheter: No Robbins      DVT Prophylaxis: Enoxaparin (Lovenox)  DVT prophylaxis - mechanical: SCDs  Ulcer prophylaxis: Not indicated    Assessed for rehab: Patient returned to prior level of function, rehabilitation not indicated at this time    Total Score: 5    ETOH Screening  CAGE Score: 1  Intervention complete date: 3/4/2019  Patient response to intervention: Denies substance abuse.  Intervention not indicated .   Patient demonstrats understanding of intervention.Plan of care: No further acute intervention.         Assessment/Plan  Left pulmonary contusion- (present on admission)   Assessment & Plan    Supplemental oxygen to maintain SaO2 greater than 93%.  Aggressive pulmonary hygiene and serial chest radiography.     Traumatic pneumothorax and hemothorax- (present on admission)   Assessment & Plan    Left hemopneumothroax on CT.  Chest tube placed on arrival to ICU.  3/2 CT chest with retained hemothorax.  3/3 Thoracoscopy with evacuation of retained hemothorax.  3/6 To water seal.  3/7 CXR with worsening effusion (likely hemothorax).  - [facility] total output 710 ml / 24 hr output 120 ml / no air leak / continue water seal.  - CT chest pending.     Closed fracture of multiple ribs with flail chest- (present on admission)   Assessment & Plan    Anterior lateral left 1, 2, 3, 4, 5, 6, 7 rib fractures. Left posterior 6th - 12th  rib fractures. Disruption of the costochondral cartilage involving the left second, third, fourth, fifth, sixth ribs. Flail anterior lateral chest.  Aggressive pulmonary hygiene and multimodal pain management.     Closed fracture of sternum- (present on admission)    Assessment & Plan    Nondisplaced inferior sternal fracture. No retrosternal hematoma.  Aggressive pulmonary hygiene and multimodal pain management.     Chronic pain- (present on admission)   Assessment & Plan    Premorbid condition treated with MS Contin 15 mg BID and Oxycodone 15 mg TID.  Home meds resumed.  3/2 Increase MS Contin to 30 BID.  3/5 Increased MS Contin to 45 BID.     Discharge planning issues- (present on admission)   Assessment & Plan    Admission date  2/27/2019  Transfer from SICU  3/1/2019  Rehab/SNF consult NA  Medically cleared for discharge NA - chest tube in place  Not discharged: NA  Reasons:  NA  Discharged Date NA     No contraindication to deep vein thrombosis (DVT) prophylaxis- (present on admission)   Assessment & Plan    Chemical DVT prophylaxis (Lovenox) initiated upon admission.  RAP score 5.  Ambulate TID.     H/O clavicle fracture- (present on admission)   Assessment & Plan    Old left clavicle fracture seen on CT chest.     Trauma- (present on admission)   Assessment & Plan    15 foot fall from roof onto ice chunks.  Trauma Red Activation.  Mike Barragan MD. Trauma Surgery.       Discussed patient condition with RN, , Patient and trauma surgery. Dr. MILKA Barragan    Patient seen, data reviewed and discussed.  Agree with assessment and plan.  SERA

## 2019-03-07 NOTE — THERAPY
"Pt demonstrating good mobility and increased michelle while ambulating. Pt required extra time for reviewing logrolling for bed mobility for less exertion and strrain on chest tube site and sternum. Pt appears at baseline. Anticipate no further isses and pt to d/c home when medically cleared w/family support.    Physical Therapy Treatment completed.   Bed Mobility:  Supine to Sit: Contact Guard Assist (to SBA w/repetition)  Transfers: Sit to Stand: Modified Independent  Gait: Level Of Assist: Modified Independent with No Equipment Needed       Plan of Care: Will benefit from Physical Therapy 2 times per week    See \"Rehab Therapy-Acute\" Patient Summary Report for complete documentation.       "

## 2019-03-07 NOTE — PROGRESS NOTES
"Patient re-educated on IS reports he has not used since yesterday. Can pull 2,000 ml on O2. Continuing to need supplement O2 2Lo2nc, 84%on RA. Left CT to water seal, minimal output, is leaking around insertion site. No air leak. MD orders reviewed, all questions answered. LBM 3/4/2019, Oral PRNs given this am will take a suppository tonight if no BM. /80   Pulse 94   Temp 36.6 °C (97.8 °F) (Temporal)   Resp 17   Ht 1.854 m (6' 1\")   Wt 101.2 kg (223 lb 1.7 oz)   SpO2 92%   BMI 29.44 kg/m²     "

## 2019-03-08 ENCOUNTER — APPOINTMENT (OUTPATIENT)
Dept: RADIOLOGY | Facility: MEDICAL CENTER | Age: 59
DRG: 166 | End: 2019-03-08
Attending: SURGERY
Payer: COMMERCIAL

## 2019-03-08 LAB
ALBUMIN SERPL BCP-MCNC: 3.2 G/DL (ref 3.2–4.9)
ALBUMIN/GLOB SERPL: 1.3 G/DL
ALP SERPL-CCNC: 75 U/L (ref 30–99)
ALT SERPL-CCNC: 25 U/L (ref 2–50)
ANION GAP SERPL CALC-SCNC: 5 MMOL/L (ref 0–11.9)
AST SERPL-CCNC: 20 U/L (ref 12–45)
BASOPHILS # BLD AUTO: 1 % (ref 0–1.8)
BASOPHILS # BLD: 0.06 K/UL (ref 0–0.12)
BILIRUB SERPL-MCNC: 0.7 MG/DL (ref 0.1–1.5)
BUN SERPL-MCNC: 12 MG/DL (ref 8–22)
CALCIUM SERPL-MCNC: 8.6 MG/DL (ref 8.5–10.5)
CHLORIDE SERPL-SCNC: 106 MMOL/L (ref 96–112)
CO2 SERPL-SCNC: 24 MMOL/L (ref 20–33)
CREAT SERPL-MCNC: 0.76 MG/DL (ref 0.5–1.4)
EKG IMPRESSION: NORMAL
EOSINOPHIL # BLD AUTO: 0.32 K/UL (ref 0–0.51)
EOSINOPHIL NFR BLD: 5.4 % (ref 0–6.9)
ERYTHROCYTE [DISTWIDTH] IN BLOOD BY AUTOMATED COUNT: 45.5 FL (ref 35.9–50)
GLOBULIN SER CALC-MCNC: 2.4 G/DL (ref 1.9–3.5)
GLUCOSE SERPL-MCNC: 103 MG/DL (ref 65–99)
HCT VFR BLD AUTO: 36.9 % (ref 42–52)
HGB BLD-MCNC: 12.3 G/DL (ref 14–18)
IMM GRANULOCYTES # BLD AUTO: 0.1 K/UL (ref 0–0.11)
IMM GRANULOCYTES NFR BLD AUTO: 1.7 % (ref 0–0.9)
LYMPHOCYTES # BLD AUTO: 1.18 K/UL (ref 1–4.8)
LYMPHOCYTES NFR BLD: 19.9 % (ref 22–41)
MCH RBC QN AUTO: 31.9 PG (ref 27–33)
MCHC RBC AUTO-ENTMCNC: 33.3 G/DL (ref 33.7–35.3)
MCV RBC AUTO: 95.6 FL (ref 81.4–97.8)
MONOCYTES # BLD AUTO: 0.71 K/UL (ref 0–0.85)
MONOCYTES NFR BLD AUTO: 12 % (ref 0–13.4)
NEUTROPHILS # BLD AUTO: 3.57 K/UL (ref 1.82–7.42)
NEUTROPHILS NFR BLD: 60 % (ref 44–72)
NRBC # BLD AUTO: 0 K/UL
NRBC BLD-RTO: 0 /100 WBC
PLATELET # BLD AUTO: 189 K/UL (ref 164–446)
PMV BLD AUTO: 8.9 FL (ref 9–12.9)
POTASSIUM SERPL-SCNC: 4.1 MMOL/L (ref 3.6–5.5)
PROT SERPL-MCNC: 5.6 G/DL (ref 6–8.2)
RBC # BLD AUTO: 3.86 M/UL (ref 4.7–6.1)
SODIUM SERPL-SCNC: 135 MMOL/L (ref 135–145)
WBC # BLD AUTO: 5.9 K/UL (ref 4.8–10.8)

## 2019-03-08 PROCEDURE — 93010 ELECTROCARDIOGRAM REPORT: CPT | Performed by: INTERNAL MEDICINE

## 2019-03-08 PROCEDURE — 700111 HCHG RX REV CODE 636 W/ 250 OVERRIDE (IP): Performed by: NURSE PRACTITIONER

## 2019-03-08 PROCEDURE — 85025 COMPLETE CBC W/AUTO DIFF WBC: CPT

## 2019-03-08 PROCEDURE — 700102 HCHG RX REV CODE 250 W/ 637 OVERRIDE(OP): Performed by: SURGERY

## 2019-03-08 PROCEDURE — A9270 NON-COVERED ITEM OR SERVICE: HCPCS | Performed by: SURGERY

## 2019-03-08 PROCEDURE — 700105 HCHG RX REV CODE 258: Performed by: NURSE PRACTITIONER

## 2019-03-08 PROCEDURE — 770022 HCHG ROOM/CARE - ICU (200)

## 2019-03-08 PROCEDURE — 700111 HCHG RX REV CODE 636 W/ 250 OVERRIDE (IP): Performed by: SURGERY

## 2019-03-08 PROCEDURE — 99291 CRITICAL CARE FIRST HOUR: CPT | Performed by: SURGERY

## 2019-03-08 PROCEDURE — 80053 COMPREHEN METABOLIC PANEL: CPT

## 2019-03-08 PROCEDURE — 700112 HCHG RX REV CODE 229: Performed by: SURGERY

## 2019-03-08 PROCEDURE — 71045 X-RAY EXAM CHEST 1 VIEW: CPT

## 2019-03-08 PROCEDURE — 700101 HCHG RX REV CODE 250: Performed by: NURSE PRACTITIONER

## 2019-03-08 RX ORDER — ACETAMINOPHEN 500 MG
1000 TABLET ORAL EVERY 6 HOURS
Status: DISCONTINUED | OUTPATIENT
Start: 2019-03-08 | End: 2019-03-12 | Stop reason: HOSPADM

## 2019-03-08 RX ORDER — HYDROMORPHONE HYDROCHLORIDE 2 MG/ML
.5-1 INJECTION, SOLUTION INTRAMUSCULAR; INTRAVENOUS; SUBCUTANEOUS
Status: DISCONTINUED | OUTPATIENT
Start: 2019-03-08 | End: 2019-03-12 | Stop reason: HOSPADM

## 2019-03-08 RX ADMIN — MORPHINE SULFATE 45 MG: 15 TABLET, EXTENDED RELEASE ORAL at 17:07

## 2019-03-08 RX ADMIN — DOCUSATE SODIUM 100 MG: 100 CAPSULE, LIQUID FILLED ORAL at 06:23

## 2019-03-08 RX ADMIN — MORPHINE SULFATE 45 MG: 15 TABLET, EXTENDED RELEASE ORAL at 06:23

## 2019-03-08 RX ADMIN — AMIODARONE HYDROCHLORIDE 0.5 MG/MIN: 50 INJECTION, SOLUTION INTRAVENOUS at 18:47

## 2019-03-08 RX ADMIN — ENOXAPARIN SODIUM 30 MG: 100 INJECTION SUBCUTANEOUS at 06:24

## 2019-03-08 RX ADMIN — LIDOCAINE 3 PATCH: 50 PATCH TOPICAL at 17:08

## 2019-03-08 RX ADMIN — GABAPENTIN 300 MG: 300 CAPSULE ORAL at 06:24

## 2019-03-08 RX ADMIN — DOCUSATE SODIUM 100 MG: 100 CAPSULE, LIQUID FILLED ORAL at 17:07

## 2019-03-08 RX ADMIN — CELECOXIB 200 MG: 200 CAPSULE ORAL at 06:24

## 2019-03-08 RX ADMIN — GABAPENTIN 300 MG: 300 CAPSULE ORAL at 17:07

## 2019-03-08 RX ADMIN — OXYCODONE HYDROCHLORIDE 15 MG: 5 TABLET ORAL at 00:36

## 2019-03-08 RX ADMIN — SENNOSIDES AND DOCUSATE SODIUM 1 TABLET: 8.6; 5 TABLET ORAL at 21:00

## 2019-03-08 RX ADMIN — GABAPENTIN 300 MG: 300 CAPSULE ORAL at 12:23

## 2019-03-08 RX ADMIN — CELECOXIB 200 MG: 200 CAPSULE ORAL at 17:07

## 2019-03-08 RX ADMIN — OXYCODONE HYDROCHLORIDE 15 MG: 5 TABLET ORAL at 21:00

## 2019-03-08 RX ADMIN — OXYCODONE HYDROCHLORIDE 15 MG: 5 TABLET ORAL at 15:27

## 2019-03-08 RX ADMIN — ENOXAPARIN SODIUM 30 MG: 100 INJECTION SUBCUTANEOUS at 17:07

## 2019-03-08 RX ADMIN — HYDROMORPHONE HYDROCHLORIDE 1 MG: 2 INJECTION INTRAMUSCULAR; INTRAVENOUS; SUBCUTANEOUS at 21:23

## 2019-03-08 RX ADMIN — DEXTROSE MONOHYDRATE: 50 INJECTION, SOLUTION INTRAVENOUS at 15:28

## 2019-03-08 RX ADMIN — HYDROMORPHONE HYDROCHLORIDE 1 MG: 2 INJECTION INTRAMUSCULAR; INTRAVENOUS; SUBCUTANEOUS at 16:56

## 2019-03-08 RX ADMIN — OXYCODONE HYDROCHLORIDE 15 MG: 5 TABLET ORAL at 12:21

## 2019-03-08 RX ADMIN — DIAZEPAM 5 MG: 2 TABLET ORAL at 16:06

## 2019-03-08 RX ADMIN — ACETAMINOPHEN 1000 MG: 500 TABLET ORAL at 17:07

## 2019-03-08 RX ADMIN — POLYETHYLENE GLYCOL 3350 1 PACKET: 17 POWDER, FOR SOLUTION ORAL at 17:07

## 2019-03-08 RX ADMIN — HYDROMORPHONE HYDROCHLORIDE 1 MG: 2 INJECTION INTRAMUSCULAR; INTRAVENOUS; SUBCUTANEOUS at 12:51

## 2019-03-08 RX ADMIN — AMIODARONE HYDROCHLORIDE 150 MG: 1.5 INJECTION, SOLUTION INTRAVENOUS at 10:55

## 2019-03-08 RX ADMIN — OXYCODONE HYDROCHLORIDE 15 MG: 5 TABLET ORAL at 04:46

## 2019-03-08 NOTE — NON-PROVIDER
Internal Medicine Medical Student Note  Note Author: Harmeet Owens, Student    Name Rasta Saunders     1960   Age/Sex 58 y.o. male   MRN [id number]   Code Status Full     Reason for interval visit  (Principal Problem)   57 y/o male admitted on 19 with trauma due to 15 foot fall from rooftop. Day 9 of chest tube for left hemopneumothorax and left flail chest. Pt was found to have rib fractures with flail chest and a non-displaced sternum fracture.    Interval Problem Daily Status Update  (problem status, last 24 hours, new history, new data )   Transferred to SICU for new onset Afib treated with Amiodarone IV  -Pt currently still in afib  -Pt denied having any symptoms of SOB, dizziness, palpitations  -Pain is stable at an 8 and pt feels that this is tolerable  -Pt ambulated yesterday and has plans to walk today  -Urination and voiding 19  -Pt is tolerating full diet  -IS at 2750 at bedside  -chest tube to water seal and no air leak serosanguinous output       ROS: Const-negative for f/c   Resp-Pt states he has localized pain while breathing in but denies SOB or worsening pain.  Cardio-denies chest pain, palpitations, racing heart, or SOB  GI-denies pain, constipation, nausea or vomiting   -denies dysuria or hemoturia  Msk- Chronic multiple joint pain unrelated to trauma. Pain related to trauma of left sided chest wall   Neuro- negative for dizziness, neuro impairments, or sensory deficits      Physical Exam       Vitals:    19 0300 19 0400 19 0500 19 0600   BP:       Pulse: 92 64 74 (!) 106   Resp: (!) 11 12 15 (!) 11   Temp:  37 °C (98.6 °F)  36.9 °C (98.4 °F)   TempSrc:    Temporal   SpO2: 97% 98%     Weight:  99.7 kg (219 lb 12.8 oz)     Height:         Body mass index is 29 kg/m². Weight: 99.7 kg (219 lb 12.8 oz)  Oxygen Therapy:  Pulse Oximetry: 98 %, O2 (LPM): 3, O2 Delivery: Nasal Cannula       Intake/Output Summary (Last 24 hours) at 19  "0816  Last data filed at 03/08/19 0600   Gross per 24 hour   Intake            785.5 ml   Output             1548 ml   Net           -762.5 ml       Physical Exam  Gen:AxO4, NAD, pt is very active  HENT: Normocephalic, JOJO, no LAD  Cardio: Pt is tachycardic with irregular rate and rhythm S1 S2 no m/r/g  Resp: normal effort with diminished lung sounds on Left side. Slight crackling in the LLL. Chest tube in place with no air leak. Serosanguinous output. Significant bruising and several small lacerations on left side of chest  Abd:No distention or TTP. Bruising in midline of abdomen in epigastric area related to trauma  MSK: Multiple scarring from previous surgeries.  Neuro: No sensorineural deficits    Labs:  Recent Labs      03/06/19   0339 03/08/19   0455   WBC  5.8  5.9   RBC  3.41*  3.86*   HEMOGLOBIN  11.0*  12.3*   HEMATOCRIT  32.8*  36.9*   MCV  96.2  95.6   MCH  32.3  31.9   RDW  45.7  45.5   PLATELETCT  166  189   MPV  8.9*  8.9*   NEUTSPOLYS  58.40  60.00   LYMPHOCYTES  22.10  19.90*   MONOCYTES  12.40  12.00   EOSINOPHILS  5.50  5.40   BASOPHILS  0.70  1.00     Recent Labs      03/07/19   2221 03/08/19   0455   SODIUM  134*  135   POTASSIUM  3.9  4.1   CHLORIDE  104  106   CO2  23  24   GLUCOSE  126*  103*   BUN  13  12     Recent Labs      03/07/19   2221 03/08/19   0455   ALBUMIN  3.5  3.2   TBILIRUBIN  0.7  0.7   ALKPHOSPHAT  73  75   TOTPROTEIN  5.7*  5.6*   ALTSGPT  28  25   ASTSGOT  22  20   CREATININE  0.85  0.76     Results for ASHWIN ONEIL (MRN [id number]) as of 3/8/2019 08:17   Ref. Range 3/7/2019 22:21   Magnesium Latest Ref Range: 1.5 - 2.5 mg/dL 2.2     Imaging:  CT Chest: \"Interval decrease in size of the left pleural effusion. Small amount of residual pleural fluid is noted particularly along the left heart border.Lingula and left lower lobe atelectasis. Right lower lobe and right middle lobe atelectasis is seen. No pneumothorax. Small amount of fluid and soft tissue air is seen " "along the deep aspect of the left pectoralis.There are displaced fractures of the left costochondral junctions involving the third and fourth ribs with an adjacent evolving small hematoma. There is likely a fracture of the costochondral junction of the fifth rib as well which is relatively   Nondisplaced. Multiple left-sided rib fractures with prominent displacement of the fourth and fifth ribs is again noted.Fracture of the inferior sternum. Small evolving underlying hematoma. Sequelae of prior granulomatous exposure. Trace right pleural effusion.\"    Chest Xray: \"Pulmonary infiltrates and layering left pleural effusion, overall stable since prior study.  Left rib fractures\"    EKG: \"ATRIAL FIBRILLATION. ABNORMAL R-WAVE PROGRESSION, EARLY TRANSITION   No previous ECG available for comparison\"        Assessment/Plan     57 y/o male admitted on 02/27/19 for trauma found to have hemopneumothorax and flail chest. Post op day 5 of thoracoscopy . Pt is recovering well and pending chest tube removal and correction of new onset afib.     Rib Fracture:  -Pt found to have left anterior 1-7 rib fractures. Left posterior 6-12 rib fractures. Disruption of costochondral cartilage on the left 2-6th ribs and a flail left sided anterior lateral chest.  -Aggressive pulmonary hygiene and multimodal pain management.     Hemopneumothorax:  -Chest CT left pleural effusion decrease with some retained pleural fluid along the left heart border  03/07/19  -CXR stable on 03/08/19  -Chest tube has had no leaks and put out 130ml in the last 24 hours  -Consider tube removal     Sternum Fracture:  -Nondisplaced inferior sternal fracture on CT 03/07/19  -Aggressive pulmonary hygeine and multimodal pain management     Left pulmonary contusion  -Maintain O2 >93%  -Aggressive pulmonary hygeine     Chronic Pain  -Chronic pain treated with MS Contin 15mg BID and Oxycodne 15 mg TID  -MS Contin increased to 45 BID on 3/05/19    New onset AFIB  -pt is " asymptomatic  -Hr has been around 100-120 with one reported episode of 150 per nurse  -Currently on amiodarone 17ml/hr IV  -Plan to re bolus and start amiodarone IV at 1 mg/min  -Still currently in Afib     DVT Prophylaxis:  -Pt is ambulating > 3x daily  -SCD in place  -Lovenox 30 mg BID     Disposition: Inpatient for chest tube and new onset Afib

## 2019-03-08 NOTE — CARE PLAN
Problem: Pain Management  Goal: Pain level will decrease to patient's comfort goal  Outcome: PROGRESSING AS EXPECTED  Using oxycodone 15mg to treat pain as a medication option. Repositioning and ambulation also being used as techniques.    Problem: Communication  Goal: The ability to communicate needs accurately and effectively will improve  Outcome: PROGRESSING AS EXPECTED  Patient communicating effectively with staff about pain, questions, and other concerns with treatment. All questions answered.

## 2019-03-08 NOTE — PROGRESS NOTES
Pt arrived to s116 via hosp bed, 3L NC, pt in afib, HR at 140 bpm. Awaiting amio bolus and gtt. Pt AO4.     2 Rn skin check complete. Bruising to left flank, spreading to the right. All interventions in place.

## 2019-03-08 NOTE — PROGRESS NOTES
Trauma / Surgical Daily Progress Note    Date of Service  3/8/2019    Chief Complaint  58 y.o. male admitted 2/27/2019 with Trauma    Interval Events  Blunt chest trauma severe  Return to ICU for afib  Amiodarone drip  Afib rate 115  Asymptomatic  lovenox  Latoyaolus amiodarone for rate control          Review of Systems  Review of Systems     Vital Signs for last 24 hours  Temp:  [36.6 °C (97.8 °F)-37.3 °C (99.2 °F)] 37.2 °C (98.9 °F)  Pulse:  [] 79  Resp:  [9-42] 21  BP: (130)/(84) 130/84  SpO2:  [86 %-98 %] 98 %    Hemodynamic parameters for last 24 hours       Respiratory Data     Respiration: (!) 21, Pulse Oximetry: 98 %     Work Of Breathing / Effort: Mild  RUL Breath Sounds: Clear, RML Breath Sounds: Diminished, RLL Breath Sounds: Diminished, FATIMAH Breath Sounds: Diminished, LLL Breath Sounds: Diminished    Physical Exam  Physical Exam   Constitutional: He is oriented to person, place, and time. He is active and cooperative. No distress.   HENT:   Head: Normocephalic.   Eyes: No scleral icterus.   Neck: Neck supple. No JVD present.   Cardiovascular: Normal rate.    Pulmonary/Chest: Effort normal. No respiratory distress. He exhibits tenderness (left chest wall).   Left chest tube in place, small serosang output, no air leak   Musculoskeletal: Normal range of motion.   Moves all extremities   Neurological: He is alert and oriented to person, place, and time.   Skin: Skin is warm and dry.   Psychiatric: He has a normal mood and affect. His behavior is normal.   Nursing note and vitals reviewed.      Laboratory  Recent Results (from the past 24 hour(s))   Comp Metabolic Panel    Collection Time: 03/08/19  4:55 AM   Result Value Ref Range    Sodium 135 135 - 145 mmol/L    Potassium 4.1 3.6 - 5.5 mmol/L    Chloride 106 96 - 112 mmol/L    Co2 24 20 - 33 mmol/L    Anion Gap 5.0 0.0 - 11.9    Glucose 103 (H) 65 - 99 mg/dL    Bun 12 8 - 22 mg/dL    Creatinine 0.76 0.50 - 1.40 mg/dL    Calcium 8.6 8.5 - 10.5 mg/dL     AST(SGOT) 20 12 - 45 U/L    ALT(SGPT) 25 2 - 50 U/L    Alkaline Phosphatase 75 30 - 99 U/L    Total Bilirubin 0.7 0.1 - 1.5 mg/dL    Albumin 3.2 3.2 - 4.9 g/dL    Total Protein 5.6 (L) 6.0 - 8.2 g/dL    Globulin 2.4 1.9 - 3.5 g/dL    A-G Ratio 1.3 g/dL   CBC WITH DIFFERENTIAL    Collection Time: 03/08/19  4:55 AM   Result Value Ref Range    WBC 5.9 4.8 - 10.8 K/uL    RBC 3.86 (L) 4.70 - 6.10 M/uL    Hemoglobin 12.3 (L) 14.0 - 18.0 g/dL    Hematocrit 36.9 (L) 42.0 - 52.0 %    MCV 95.6 81.4 - 97.8 fL    MCH 31.9 27.0 - 33.0 pg    MCHC 33.3 (L) 33.7 - 35.3 g/dL    RDW 45.5 35.9 - 50.0 fL    Platelet Count 189 164 - 446 K/uL    MPV 8.9 (L) 9.0 - 12.9 fL    Neutrophils-Polys 60.00 44.00 - 72.00 %    Lymphocytes 19.90 (L) 22.00 - 41.00 %    Monocytes 12.00 0.00 - 13.40 %    Eosinophils 5.40 0.00 - 6.90 %    Basophils 1.00 0.00 - 1.80 %    Immature Granulocytes 1.70 (H) 0.00 - 0.90 %    Nucleated RBC 0.00 /100 WBC    Neutrophils (Absolute) 3.57 1.82 - 7.42 K/uL    Lymphs (Absolute) 1.18 1.00 - 4.80 K/uL    Monos (Absolute) 0.71 0.00 - 0.85 K/uL    Eos (Absolute) 0.32 0.00 - 0.51 K/uL    Baso (Absolute) 0.06 0.00 - 0.12 K/uL    Immature Granulocytes (abs) 0.10 0.00 - 0.11 K/uL    NRBC (Absolute) 0.00 K/uL   ESTIMATED GFR    Collection Time: 03/08/19  4:55 AM   Result Value Ref Range    GFR If African American >60 >60 mL/min/1.73 m 2    GFR If Non African American >60 >60 mL/min/1.73 m 2       Fluids    Intake/Output Summary (Last 24 hours) at 03/08/19 2249  Last data filed at 03/08/19 2200   Gross per 24 hour   Intake          1359.51 ml   Output             4205 ml   Net         -2845.49 ml       Core Measures & Quality Metrics  Core Measures & Quality Metrics  MAMI Score  ETOH Screening    Assessment/Plan  A-fib (HCC)   Assessment & Plan    Irregular rhythm noted on assessment  EKG confirmes afib   Transfer to ICU for amio gtt/bolus  EKG for rythem changes  Check mag and CMP     Left pulmonary contusion- (present on  admission)   Assessment & Plan    Supplemental oxygen to maintain SaO2 greater than 93%.  Aggressive pulmonary hygiene and serial chest radiography.     Traumatic pneumothorax and hemothorax- (present on admission)   Assessment & Plan    Left hemopneumothroax on CT.  Chest tube placed on arrival to ICU.  3/2 CT chest with retained hemothorax.  3/3 Thoracoscopy with evacuation of retained hemothorax.  3/6 To water seal.  3/7 CXR with worsening effusion (likely hemothorax).  - [facility] total output 710 ml / 24 hr output 120 ml / no air leak / continue water seal.  - CT chest pending.     Closed fracture of multiple ribs with flail chest- (present on admission)   Assessment & Plan    Anterior lateral left 1, 2, 3, 4, 5, 6, 7 rib fractures. Left posterior 6th - 12th  rib fractures. Disruption of the costochondral cartilage involving the left second, third, fourth, fifth, sixth ribs. Flail anterior lateral chest.  Aggressive pulmonary hygiene and multimodal pain management.     Closed fracture of sternum- (present on admission)   Assessment & Plan    Nondisplaced inferior sternal fracture. No retrosternal hematoma.  Aggressive pulmonary hygiene and multimodal pain management.     Chronic pain- (present on admission)   Assessment & Plan    Premorbid condition treated with MS Contin 15 mg BID and Oxycodone 15 mg TID.  Home meds resumed.  3/2 Increase MS Contin to 30 BID.  3/5 Increased MS Contin to 45 BID.     Discharge planning issues- (present on admission)   Assessment & Plan    Admission date  2/27/2019  Transfer from SICU  3/1/2019  Rehab/SNF consult NA  Medically cleared for discharge NA - chest tube in place  Not discharged: NA  Reasons:  NA  Discharged Date NA     No contraindication to deep vein thrombosis (DVT) prophylaxis- (present on admission)   Assessment & Plan    Chemical DVT prophylaxis (Lovenox) initiated upon admission.  RAP score 5.  Ambulate TID.     H/O clavicle fracture- (present on admission)    Assessment & Plan    Old left clavicle fracture seen on CT chest.     Trauma- (present on admission)   Assessment & Plan    15 foot fall from roof onto ice chunks.  Trauma Red Activation.  Mike Barragan MD. Trauma Surgery.         Discussed patient multidisciplinary team  CRITICAL CARE TIME EXCLUDING PROCEDURES: 35   Minutes amiodarone drip with rebolus for afib, multiple trauma

## 2019-03-08 NOTE — PROGRESS NOTES
"Blood pressure 113/75, pulse 90, temperature 37.3 °C (99.1 °F), temperature source Temporal, resp. rate 20, height 1.854 m (6' 1\"), weight 101.2 kg (223 lb 1.7 oz), SpO2 95 %.    EKG confirms new onset afib  Patient in no acute distress, no new chest pain, no sob  STAT CMP and mag  Transfer to ICU for amio gtt and bolus  Discussed with Dr Barragan  "

## 2019-03-08 NOTE — PROGRESS NOTES
Patient left floor with SICU RN Yoon and Bernadine. All patient belongings sent with patient. Patient declined updating any family members. CT on the L still to water seal no air leaks.

## 2019-03-08 NOTE — PROGRESS NOTES
Bedside report received.  Assessment complete.  A&O x 4. Patient calls appropriately.  Patient in bed with no assist. Bed alarm n/a.   Patient has 8/10 pain. Denies pain interventions.  Denies N&V. Tolerating regular diet.  Chest tube on the L, leaking at the site. CT to water seal, no air leak present. Bruising on the L side.  + void, + flatus, + BM.  Patient denies SOB.  SCD's refused, patient ambulatory.  Review plan with of care with patient. Call light and personal belongings with in reach. Hourly rounding in place. All needs met at this time.

## 2019-03-08 NOTE — ASSESSMENT & PLAN NOTE
3/7 New onset afib - amiodarone drip initiated  3/8 Rebolused, converted to sinus  3/9 Transitioned to Amiodarone 400mg TID  - late episode of a-fib requiring iv bolus and drip  3/10 Echocardiogram completed  3/11 Transition to oral Amiodarone- 400mg X 10 doses for full 10g load  3/16- Transition to Amiodarone 200mg daily  EKG for rhythm changes

## 2019-03-08 NOTE — CARE PLAN
Problem: Pain Management  Goal: Pain level will decrease to patient's comfort goal  Outcome: PROGRESSING AS EXPECTED    Intervention: Educate and implement non-pharmacologic comfort measures. Examples: relaxation, distration, play therapy, activity therapy, massage, etc.  Pains meds administered prior to ambulation      Problem: Respiratory:  Goal: Respiratory status will improve  Outcome: PROGRESSING AS EXPECTED    Intervention: Assess and monitor pulmonary status  Diminished breath sounds, encourage IS

## 2019-03-09 ENCOUNTER — APPOINTMENT (OUTPATIENT)
Dept: RADIOLOGY | Facility: MEDICAL CENTER | Age: 59
DRG: 166 | End: 2019-03-09
Attending: SURGERY
Payer: COMMERCIAL

## 2019-03-09 PROBLEM — I10 ESSENTIAL HYPERTENSION: Status: ACTIVE | Noted: 2019-03-09

## 2019-03-09 PROBLEM — F41.9 ANXIETY: Status: ACTIVE | Noted: 2019-03-09

## 2019-03-09 LAB
ALBUMIN SERPL BCP-MCNC: 3.3 G/DL (ref 3.2–4.9)
ALBUMIN/GLOB SERPL: 1.4 G/DL
ALP SERPL-CCNC: 86 U/L (ref 30–99)
ALT SERPL-CCNC: 21 U/L (ref 2–50)
ANION GAP SERPL CALC-SCNC: 5 MMOL/L (ref 0–11.9)
AST SERPL-CCNC: 15 U/L (ref 12–45)
BASOPHILS # BLD AUTO: 1 % (ref 0–1.8)
BASOPHILS # BLD: 0.07 K/UL (ref 0–0.12)
BILIRUB SERPL-MCNC: 0.7 MG/DL (ref 0.1–1.5)
BUN SERPL-MCNC: 14 MG/DL (ref 8–22)
CALCIUM SERPL-MCNC: 8.6 MG/DL (ref 8.5–10.5)
CHLORIDE SERPL-SCNC: 104 MMOL/L (ref 96–112)
CO2 SERPL-SCNC: 26 MMOL/L (ref 20–33)
CREAT SERPL-MCNC: 0.89 MG/DL (ref 0.5–1.4)
EKG IMPRESSION: NORMAL
EKG IMPRESSION: NORMAL
EOSINOPHIL # BLD AUTO: 0.34 K/UL (ref 0–0.51)
EOSINOPHIL NFR BLD: 5 % (ref 0–6.9)
ERYTHROCYTE [DISTWIDTH] IN BLOOD BY AUTOMATED COUNT: 45.1 FL (ref 35.9–50)
GLOBULIN SER CALC-MCNC: 2.3 G/DL (ref 1.9–3.5)
GLUCOSE SERPL-MCNC: 124 MG/DL (ref 65–99)
HCT VFR BLD AUTO: 34.6 % (ref 42–52)
HGB BLD-MCNC: 11.8 G/DL (ref 14–18)
IMM GRANULOCYTES # BLD AUTO: 0.22 K/UL (ref 0–0.11)
IMM GRANULOCYTES NFR BLD AUTO: 3.2 % (ref 0–0.9)
LYMPHOCYTES # BLD AUTO: 1.01 K/UL (ref 1–4.8)
LYMPHOCYTES NFR BLD: 14.8 % (ref 22–41)
MAGNESIUM SERPL-MCNC: 2.1 MG/DL (ref 1.5–2.5)
MCH RBC QN AUTO: 32.6 PG (ref 27–33)
MCHC RBC AUTO-ENTMCNC: 34.1 G/DL (ref 33.7–35.3)
MCV RBC AUTO: 95.6 FL (ref 81.4–97.8)
MONOCYTES # BLD AUTO: 0.8 K/UL (ref 0–0.85)
MONOCYTES NFR BLD AUTO: 11.7 % (ref 0–13.4)
NEUTROPHILS # BLD AUTO: 4.38 K/UL (ref 1.82–7.42)
NEUTROPHILS NFR BLD: 64.3 % (ref 44–72)
NRBC # BLD AUTO: 0 K/UL
NRBC BLD-RTO: 0 /100 WBC
PLATELET # BLD AUTO: 201 K/UL (ref 164–446)
PMV BLD AUTO: 9.1 FL (ref 9–12.9)
POTASSIUM SERPL-SCNC: 4.1 MMOL/L (ref 3.6–5.5)
PROT SERPL-MCNC: 5.6 G/DL (ref 6–8.2)
RBC # BLD AUTO: 3.62 M/UL (ref 4.7–6.1)
SODIUM SERPL-SCNC: 135 MMOL/L (ref 135–145)
WBC # BLD AUTO: 6.8 K/UL (ref 4.8–10.8)

## 2019-03-09 PROCEDURE — A9270 NON-COVERED ITEM OR SERVICE: HCPCS | Performed by: SURGERY

## 2019-03-09 PROCEDURE — 700105 HCHG RX REV CODE 258: Performed by: NURSE PRACTITIONER

## 2019-03-09 PROCEDURE — 85025 COMPLETE CBC W/AUTO DIFF WBC: CPT

## 2019-03-09 PROCEDURE — 71045 X-RAY EXAM CHEST 1 VIEW: CPT

## 2019-03-09 PROCEDURE — A9270 NON-COVERED ITEM OR SERVICE: HCPCS | Performed by: NURSE PRACTITIONER

## 2019-03-09 PROCEDURE — 700102 HCHG RX REV CODE 250 W/ 637 OVERRIDE(OP): Performed by: SURGERY

## 2019-03-09 PROCEDURE — 700111 HCHG RX REV CODE 636 W/ 250 OVERRIDE (IP): Performed by: SURGERY

## 2019-03-09 PROCEDURE — 3E0234Z INTRODUCTION OF SERUM, TOXOID AND VACCINE INTO MUSCLE, PERCUTANEOUS APPROACH: ICD-10-PCS | Performed by: SURGERY

## 2019-03-09 PROCEDURE — 80053 COMPREHEN METABOLIC PANEL: CPT

## 2019-03-09 PROCEDURE — 99233 SBSQ HOSP IP/OBS HIGH 50: CPT | Performed by: SURGERY

## 2019-03-09 PROCEDURE — 90471 IMMUNIZATION ADMIN: CPT

## 2019-03-09 PROCEDURE — 770001 HCHG ROOM/CARE - MED/SURG/GYN PRIV*

## 2019-03-09 PROCEDURE — 83735 ASSAY OF MAGNESIUM: CPT

## 2019-03-09 PROCEDURE — 93010 ELECTROCARDIOGRAM REPORT: CPT | Mod: 76 | Performed by: INTERNAL MEDICINE

## 2019-03-09 PROCEDURE — 700102 HCHG RX REV CODE 250 W/ 637 OVERRIDE(OP): Performed by: NURSE PRACTITIONER

## 2019-03-09 PROCEDURE — 90732 PPSV23 VACC 2 YRS+ SUBQ/IM: CPT | Performed by: SURGERY

## 2019-03-09 PROCEDURE — 700112 HCHG RX REV CODE 229: Performed by: SURGERY

## 2019-03-09 PROCEDURE — 700101 HCHG RX REV CODE 250: Performed by: NURSE PRACTITIONER

## 2019-03-09 PROCEDURE — 700111 HCHG RX REV CODE 636 W/ 250 OVERRIDE (IP): Performed by: NURSE PRACTITIONER

## 2019-03-09 PROCEDURE — 93005 ELECTROCARDIOGRAM TRACING: CPT | Performed by: NURSE PRACTITIONER

## 2019-03-09 RX ORDER — AMIODARONE HYDROCHLORIDE 200 MG/1
400 TABLET ORAL 3 TIMES DAILY
Status: DISCONTINUED | OUTPATIENT
Start: 2019-03-10 | End: 2019-03-09

## 2019-03-09 RX ORDER — AMIODARONE HYDROCHLORIDE 200 MG/1
400 TABLET ORAL 3 TIMES DAILY
Status: DISCONTINUED | OUTPATIENT
Start: 2019-03-10 | End: 2019-03-10

## 2019-03-09 RX ORDER — PAROXETINE HYDROCHLORIDE 20 MG/1
10 TABLET, FILM COATED ORAL EVERY MORNING
Status: CANCELLED | OUTPATIENT
Start: 2019-03-10

## 2019-03-09 RX ORDER — AMIODARONE HYDROCHLORIDE 200 MG/1
200 TABLET ORAL 3 TIMES DAILY
Status: DISCONTINUED | OUTPATIENT
Start: 2019-03-10 | End: 2019-03-09

## 2019-03-09 RX ORDER — AMIODARONE HYDROCHLORIDE 200 MG/1
200 TABLET ORAL
Status: DISCONTINUED | OUTPATIENT
Start: 2019-03-17 | End: 2019-03-10

## 2019-03-09 RX ORDER — AMIODARONE HYDROCHLORIDE 200 MG/1
400 TABLET ORAL TWICE DAILY
Status: DISCONTINUED | OUTPATIENT
Start: 2019-03-09 | End: 2019-03-09

## 2019-03-09 RX ORDER — AMIODARONE HYDROCHLORIDE 200 MG/1
200 TABLET ORAL
Status: DISCONTINUED | OUTPATIENT
Start: 2019-03-21 | End: 2019-03-09

## 2019-03-09 RX ORDER — METAXALONE 800 MG/1
800 TABLET ORAL 3 TIMES DAILY
Status: DISCONTINUED | OUTPATIENT
Start: 2019-03-09 | End: 2019-03-12 | Stop reason: HOSPADM

## 2019-03-09 RX ORDER — AMIODARONE HYDROCHLORIDE 200 MG/1
200 TABLET ORAL ONCE
Status: COMPLETED | OUTPATIENT
Start: 2019-03-09 | End: 2019-03-09

## 2019-03-09 RX ORDER — HYDROMORPHONE HYDROCHLORIDE 2 MG/ML
.5-1 INJECTION, SOLUTION INTRAMUSCULAR; INTRAVENOUS; SUBCUTANEOUS
Status: CANCELLED | OUTPATIENT
Start: 2019-03-09

## 2019-03-09 RX ADMIN — GABAPENTIN 300 MG: 300 CAPSULE ORAL at 05:48

## 2019-03-09 RX ADMIN — ACETAMINOPHEN 1000 MG: 500 TABLET ORAL at 10:43

## 2019-03-09 RX ADMIN — OXYCODONE HYDROCHLORIDE 15 MG: 5 TABLET ORAL at 00:22

## 2019-03-09 RX ADMIN — HYDROMORPHONE HYDROCHLORIDE 0.5 MG: 2 INJECTION INTRAMUSCULAR; INTRAVENOUS; SUBCUTANEOUS at 11:15

## 2019-03-09 RX ADMIN — HYDROMORPHONE HYDROCHLORIDE 1 MG: 2 INJECTION INTRAMUSCULAR; INTRAVENOUS; SUBCUTANEOUS at 18:42

## 2019-03-09 RX ADMIN — POLYETHYLENE GLYCOL 3350 1 PACKET: 17 POWDER, FOR SOLUTION ORAL at 18:10

## 2019-03-09 RX ADMIN — AMIODARONE HYDROCHLORIDE 0.5 MG/MIN: 50 INJECTION, SOLUTION INTRAVENOUS at 09:48

## 2019-03-09 RX ADMIN — MORPHINE SULFATE 45 MG: 15 TABLET, EXTENDED RELEASE ORAL at 05:48

## 2019-03-09 RX ADMIN — HYDROMORPHONE HYDROCHLORIDE 1 MG: 2 INJECTION INTRAMUSCULAR; INTRAVENOUS; SUBCUTANEOUS at 00:39

## 2019-03-09 RX ADMIN — OXYCODONE HYDROCHLORIDE 15 MG: 5 TABLET ORAL at 14:10

## 2019-03-09 RX ADMIN — POLYETHYLENE GLYCOL 3350 1 PACKET: 17 POWDER, FOR SOLUTION ORAL at 05:49

## 2019-03-09 RX ADMIN — DOCUSATE SODIUM 100 MG: 100 CAPSULE, LIQUID FILLED ORAL at 05:48

## 2019-03-09 RX ADMIN — OXYCODONE HYDROCHLORIDE 15 MG: 5 TABLET ORAL at 10:43

## 2019-03-09 RX ADMIN — LIDOCAINE 3 PATCH: 50 PATCH TOPICAL at 18:11

## 2019-03-09 RX ADMIN — GABAPENTIN 300 MG: 300 CAPSULE ORAL at 18:09

## 2019-03-09 RX ADMIN — DOCUSATE SODIUM 100 MG: 100 CAPSULE, LIQUID FILLED ORAL at 18:09

## 2019-03-09 RX ADMIN — CELECOXIB 200 MG: 200 CAPSULE ORAL at 18:09

## 2019-03-09 RX ADMIN — CELECOXIB 200 MG: 200 CAPSULE ORAL at 05:48

## 2019-03-09 RX ADMIN — HYDROMORPHONE HYDROCHLORIDE 1 MG: 2 INJECTION INTRAMUSCULAR; INTRAVENOUS; SUBCUTANEOUS at 06:02

## 2019-03-09 RX ADMIN — MORPHINE SULFATE 45 MG: 15 TABLET, EXTENDED RELEASE ORAL at 18:09

## 2019-03-09 RX ADMIN — GABAPENTIN 300 MG: 300 CAPSULE ORAL at 10:43

## 2019-03-09 RX ADMIN — PNEUMOCOCCAL VACCINE POLYVALENT 25 MCG
25; 25; 25; 25; 25; 25; 25; 25; 25; 25; 25; 25; 25; 25; 25; 25; 25; 25; 25; 25; 25; 25; 25 INJECTION, SOLUTION INTRAMUSCULAR; SUBCUTANEOUS at 22:23

## 2019-03-09 RX ADMIN — ACETAMINOPHEN 1000 MG: 500 TABLET ORAL at 18:09

## 2019-03-09 RX ADMIN — AMIODARONE HYDROCHLORIDE 400 MG: 200 TABLET ORAL at 19:49

## 2019-03-09 RX ADMIN — ACETAMINOPHEN 1000 MG: 500 TABLET ORAL at 00:23

## 2019-03-09 RX ADMIN — AMIODARONE HYDROCHLORIDE 200 MG: 200 TABLET ORAL at 12:03

## 2019-03-09 RX ADMIN — ACETAMINOPHEN 1000 MG: 500 TABLET ORAL at 05:48

## 2019-03-09 RX ADMIN — ENOXAPARIN SODIUM 30 MG: 100 INJECTION SUBCUTANEOUS at 18:09

## 2019-03-09 RX ADMIN — METAXALONE 800 MG: 800 TABLET ORAL at 20:41

## 2019-03-09 RX ADMIN — HYDROMORPHONE HYDROCHLORIDE 1 MG: 2 INJECTION INTRAMUSCULAR; INTRAVENOUS; SUBCUTANEOUS at 02:47

## 2019-03-09 RX ADMIN — OXYCODONE HYDROCHLORIDE 15 MG: 5 TABLET ORAL at 19:47

## 2019-03-09 RX ADMIN — ENOXAPARIN SODIUM 30 MG: 100 INJECTION SUBCUTANEOUS at 05:49

## 2019-03-09 RX ADMIN — HYDROMORPHONE HYDROCHLORIDE 0.5 MG: 2 INJECTION INTRAMUSCULAR; INTRAVENOUS; SUBCUTANEOUS at 16:00

## 2019-03-09 ASSESSMENT — ENCOUNTER SYMPTOMS
MYALGIAS: 1
SHORTNESS OF BREATH: 0

## 2019-03-09 NOTE — PROGRESS NOTES
2 RN skin assessment complete.    Areas to note:  -left rib cage bruising and abrasions  -scab to left ear lobe    Standard interventions in place to prevent skin breakdown.

## 2019-03-09 NOTE — CARE PLAN
Problem: Pain Management  Goal: Pain level will decrease to patient's comfort goal    Intervention: Follow pain managment plan developed in collaboration with patient and Interdisciplinary Team  Pain assessed during each pt encounter. Pt educated on pharmacological pain options as well as non-pharmacological interventions to reduce and manage pain.      Problem: Safety  Goal: Will remain free from falls    Intervention: Implement fall precautions  Treaded socks when pt out of bed, pt educated on call button use and call button placed in reach during each pt encounter.

## 2019-03-09 NOTE — PROGRESS NOTES
Trauma / Surgical Daily Progress Note    Date of Service  3/9/2019    Chief Complaint  58 y.o. male admitted 2/27/2019 s/p fall from roof while working. Has injuries including flail chest, pneumothorax, pulmonary contusion and sternal fracture.  Interval Events  Hospital day # 11  Pt currently requires ICU care  Seen on rounds and discussed with multidisciplinary team  Physiologic derangements preclude floor transfer  Events and interventions include:  Transitioning to oral amiodarone  Chest tube removed  Aggressive pulmonary toilet-at high risk for decompensation    Review of Systems  Review of Systems   Respiratory: Negative for shortness of breath.    Musculoskeletal: Positive for myalgias.   All other systems reviewed and are negative.       Vital Signs for last 24 hours  Temp:  [36.6 °C (97.8 °F)-37.3 °C (99.2 °F)] 36.7 °C (98 °F)  Pulse:  [66-85] 78  Resp:  [10-42] 12  SpO2:  [90 %-99 %] 93 %    Hemodynamic parameters for last 24 hours       Respiratory Data     Respiration: 12, Pulse Oximetry: 93 %     Work Of Breathing / Effort: Mild  RUL Breath Sounds: Clear, RML Breath Sounds: Diminished, RLL Breath Sounds: Diminished, FATIMAH Breath Sounds: Diminished, LLL Breath Sounds: Diminished    Physical Exam  Physical Exam   Constitutional: He is oriented to person, place, and time. He appears well-developed and well-nourished. He appears distressed.   HENT:   Head: Normocephalic and atraumatic.   Eyes: Pupils are equal, round, and reactive to light. EOM are normal. No scleral icterus.   Neck: Normal range of motion. Neck supple. No JVD present.   Cardiovascular: Normal rate, regular rhythm and normal heart sounds.    Pulmonary/Chest: Effort normal. No respiratory distress.   Abdominal: Soft. Bowel sounds are normal. He exhibits no distension. There is no tenderness.   Musculoskeletal: Normal range of motion. He exhibits no edema or deformity.   Neurological: He is alert and oriented to person, place, and time. No  cranial nerve deficit.   Skin: Skin is warm and dry. No erythema.   Psychiatric: He has a normal mood and affect. His behavior is normal. Thought content normal.       Laboratory  Recent Results (from the past 24 hour(s))   Comp Metabolic Panel    Collection Time: 03/09/19  3:49 AM   Result Value Ref Range    Sodium 135 135 - 145 mmol/L    Potassium 4.1 3.6 - 5.5 mmol/L    Chloride 104 96 - 112 mmol/L    Co2 26 20 - 33 mmol/L    Anion Gap 5.0 0.0 - 11.9    Glucose 124 (H) 65 - 99 mg/dL    Bun 14 8 - 22 mg/dL    Creatinine 0.89 0.50 - 1.40 mg/dL    Calcium 8.6 8.5 - 10.5 mg/dL    AST(SGOT) 15 12 - 45 U/L    ALT(SGPT) 21 2 - 50 U/L    Alkaline Phosphatase 86 30 - 99 U/L    Total Bilirubin 0.7 0.1 - 1.5 mg/dL    Albumin 3.3 3.2 - 4.9 g/dL    Total Protein 5.6 (L) 6.0 - 8.2 g/dL    Globulin 2.3 1.9 - 3.5 g/dL    A-G Ratio 1.4 g/dL   CBC WITH DIFFERENTIAL    Collection Time: 03/09/19  3:49 AM   Result Value Ref Range    WBC 6.8 4.8 - 10.8 K/uL    RBC 3.62 (L) 4.70 - 6.10 M/uL    Hemoglobin 11.8 (L) 14.0 - 18.0 g/dL    Hematocrit 34.6 (L) 42.0 - 52.0 %    MCV 95.6 81.4 - 97.8 fL    MCH 32.6 27.0 - 33.0 pg    MCHC 34.1 33.7 - 35.3 g/dL    RDW 45.1 35.9 - 50.0 fL    Platelet Count 201 164 - 446 K/uL    MPV 9.1 9.0 - 12.9 fL    Neutrophils-Polys 64.30 44.00 - 72.00 %    Lymphocytes 14.80 (L) 22.00 - 41.00 %    Monocytes 11.70 0.00 - 13.40 %    Eosinophils 5.00 0.00 - 6.90 %    Basophils 1.00 0.00 - 1.80 %    Immature Granulocytes 3.20 (H) 0.00 - 0.90 %    Nucleated RBC 0.00 /100 WBC    Neutrophils (Absolute) 4.38 1.82 - 7.42 K/uL    Lymphs (Absolute) 1.01 1.00 - 4.80 K/uL    Monos (Absolute) 0.80 0.00 - 0.85 K/uL    Eos (Absolute) 0.34 0.00 - 0.51 K/uL    Baso (Absolute) 0.07 0.00 - 0.12 K/uL    Immature Granulocytes (abs) 0.22 (H) 0.00 - 0.11 K/uL    NRBC (Absolute) 0.00 K/uL   MAGNESIUM    Collection Time: 03/09/19  3:49 AM   Result Value Ref Range    Magnesium 2.1 1.5 - 2.5 mg/dL   ESTIMATED GFR    Collection Time:  03/09/19  3:49 AM   Result Value Ref Range    GFR If African American >60 >60 mL/min/1.73 m 2    GFR If Non African American >60 >60 mL/min/1.73 m 2       Fluids    Intake/Output Summary (Last 24 hours) at 03/09/19 1343  Last data filed at 03/09/19 1300   Gross per 24 hour   Intake          1141.01 ml   Output             2960 ml   Net         -1818.99 ml       Core Measures & Quality Metrics  Labs reviewed, Medications reviewed and Radiology images reviewed  Robbins catheter: No Robbins      DVT Prophylaxis: Enoxaparin (Lovenox)  DVT prophylaxis - mechanical: SCDs  Ulcer prophylaxis: Not indicated        MAMI Score  ETOH Screening    Assessment/Plan  A-fib (HCC)   Assessment & Plan    Irregular rhythm noted on assessment  EKG confirmes afib   Transfer to ICU for amio gtt/bolus  EKG for rythem changes  Check mag and CMP  3-9 transitioning to oral amiodarone     Left pulmonary contusion- (present on admission)   Assessment & Plan    Supplemental oxygen to maintain SaO2 greater than 93%.  Aggressive pulmonary hygiene and serial chest radiography.     Traumatic pneumothorax and hemothorax- (present on admission)   Assessment & Plan    Left hemopneumothroax on CT.  Chest tube placed on arrival to ICU.  3/2 CT chest with retained hemothorax.  3/3 Thoracoscopy with evacuation of retained hemothorax.  3/6 To water seal.  3/7 CT-minimal effuson  3/9 Ct output decreased-removed.     Closed fracture of multiple ribs with flail chest- (present on admission)   Assessment & Plan    Anterior lateral left 1, 2, 3, 4, 5, 6, 7 rib fractures. Left posterior 6th - 12th  rib fractures. Disruption of the costochondral cartilage involving the left second, third, fourth, fifth, sixth ribs. Flail anterior lateral chest  Aggressive pulmonary hygiene and multimodal pain management.     Closed fracture of sternum- (present on admission)   Assessment & Plan    Nondisplaced inferior sternal fracture. No retrosternal hematoma.  Aggressive pulmonary  hygiene and multimodal pain management.     Chronic pain- (present on admission)   Assessment & Plan    Premorbid condition treated with MS Contin 15 mg BID and Oxycodone 15 mg TID.  Home meds resumed.  3/2 Increase MS Contin to 30 BID.  3/5 Increased MS Contin to 45 BID.     Discharge planning issues- (present on admission)   Assessment & Plan    Admission date  2/27/2019  Transfer from SICU  3/1/2019  Rehab/SNF consult NA  Medically cleared for discharge NA - chest tube in place  Not discharged: NA  Reasons:  NA  Discharged Date NA     No contraindication to deep vein thrombosis (DVT) prophylaxis- (present on admission)   Assessment & Plan    Chemical DVT prophylaxis (Lovenox) initiated upon admission.  RAP score 5.  Ambulate TID.     H/O clavicle fracture- (present on admission)   Assessment & Plan    Old left clavicle fracture seen on CT chest.     Trauma- (present on admission)   Assessment & Plan    15 foot fall from roof onto ice chunks.  Trauma Red Activation.  Mike Barragan MD. Trauma Surgery.         Discussed patient condition with RN, RT and Pharmacy.

## 2019-03-09 NOTE — CARE PLAN
Problem: Pain Management  Goal: Pain level will decrease to patient's comfort goal  Outcome: PROGRESSING SLOWER THAN EXPECTED  Assess patient's pain level Q2H and provide interventions as indicated    Problem: Safety  Goal: Will remain free from falls  Outcome: PROGRESSING AS EXPECTED  Instruct patient to notify staff of any needs by utilizing the call light system

## 2019-03-09 NOTE — PROGRESS NOTES
2 RN skin check completed. Bruising on left side from original injury, skin tears underneath chest tube dressing, scab on left ear lobe.

## 2019-03-10 ENCOUNTER — APPOINTMENT (OUTPATIENT)
Dept: RADIOLOGY | Facility: MEDICAL CENTER | Age: 59
DRG: 166 | End: 2019-03-10
Attending: SURGERY
Payer: COMMERCIAL

## 2019-03-10 ENCOUNTER — APPOINTMENT (OUTPATIENT)
Dept: CARDIOLOGY | Facility: MEDICAL CENTER | Age: 59
DRG: 166 | End: 2019-03-10
Attending: NURSE PRACTITIONER
Payer: COMMERCIAL

## 2019-03-10 LAB
ALBUMIN SERPL BCP-MCNC: 3.3 G/DL (ref 3.2–4.9)
ALBUMIN/GLOB SERPL: 1.2 G/DL
ALP SERPL-CCNC: 94 U/L (ref 30–99)
ALT SERPL-CCNC: 16 U/L (ref 2–50)
ANION GAP SERPL CALC-SCNC: 9 MMOL/L (ref 0–11.9)
AST SERPL-CCNC: 16 U/L (ref 12–45)
BASOPHILS # BLD AUTO: 3.5 % (ref 0–1.8)
BASOPHILS # BLD: 0.22 K/UL (ref 0–0.12)
BILIRUB SERPL-MCNC: 0.7 MG/DL (ref 0.1–1.5)
BUN SERPL-MCNC: 11 MG/DL (ref 8–22)
CALCIUM SERPL-MCNC: 8.4 MG/DL (ref 8.5–10.5)
CHLORIDE SERPL-SCNC: 104 MMOL/L (ref 96–112)
CO2 SERPL-SCNC: 26 MMOL/L (ref 20–33)
CREAT SERPL-MCNC: 0.89 MG/DL (ref 0.5–1.4)
EKG IMPRESSION: NORMAL
EOSINOPHIL # BLD AUTO: 0.22 K/UL (ref 0–0.51)
EOSINOPHIL NFR BLD: 3.5 % (ref 0–6.9)
ERYTHROCYTE [DISTWIDTH] IN BLOOD BY AUTOMATED COUNT: 44.9 FL (ref 35.9–50)
GLOBULIN SER CALC-MCNC: 2.7 G/DL (ref 1.9–3.5)
GLUCOSE SERPL-MCNC: 110 MG/DL (ref 65–99)
HCT VFR BLD AUTO: 35.2 % (ref 42–52)
HGB BLD-MCNC: 11.9 G/DL (ref 14–18)
LV EJECT FRACT MOD 2C 99903: 43.83
LV EJECT FRACT MOD 4C 99902: 71.04
LV EJECT FRACT MOD BP 99901: 61.18
LYMPHOCYTES # BLD AUTO: 1.04 K/UL (ref 1–4.8)
LYMPHOCYTES NFR BLD: 16.5 % (ref 22–41)
MANUAL DIFF BLD: NORMAL
MCH RBC QN AUTO: 32.4 PG (ref 27–33)
MCHC RBC AUTO-ENTMCNC: 33.8 G/DL (ref 33.7–35.3)
MCV RBC AUTO: 95.9 FL (ref 81.4–97.8)
MONOCYTES # BLD AUTO: 0.6 K/UL (ref 0–0.85)
MONOCYTES NFR BLD AUTO: 9.6 % (ref 0–13.4)
MORPHOLOGY BLD-IMP: NORMAL
MYELOCYTES NFR BLD MANUAL: 1.7 %
NEUTROPHILS # BLD AUTO: 4.05 K/UL (ref 1.82–7.42)
NEUTROPHILS NFR BLD: 64.3 % (ref 44–72)
NRBC # BLD AUTO: 0 K/UL
NRBC BLD-RTO: 0 /100 WBC
PLATELET # BLD AUTO: 232 K/UL (ref 164–446)
PLATELET BLD QL SMEAR: NORMAL
PMV BLD AUTO: 9.4 FL (ref 9–12.9)
POTASSIUM SERPL-SCNC: 4.2 MMOL/L (ref 3.6–5.5)
PROMYELOCYTES NFR BLD MANUAL: 0.9 %
PROT SERPL-MCNC: 6 G/DL (ref 6–8.2)
RBC # BLD AUTO: 3.67 M/UL (ref 4.7–6.1)
RBC BLD AUTO: NORMAL
SODIUM SERPL-SCNC: 139 MMOL/L (ref 135–145)
WBC # BLD AUTO: 6.3 K/UL (ref 4.8–10.8)

## 2019-03-10 PROCEDURE — A9270 NON-COVERED ITEM OR SERVICE: HCPCS | Performed by: SURGERY

## 2019-03-10 PROCEDURE — 99233 SBSQ HOSP IP/OBS HIGH 50: CPT | Performed by: SURGERY

## 2019-03-10 PROCEDURE — 700102 HCHG RX REV CODE 250 W/ 637 OVERRIDE(OP): Performed by: SURGERY

## 2019-03-10 PROCEDURE — 700105 HCHG RX REV CODE 258: Performed by: NURSE PRACTITIONER

## 2019-03-10 PROCEDURE — 700111 HCHG RX REV CODE 636 W/ 250 OVERRIDE (IP): Performed by: SURGERY

## 2019-03-10 PROCEDURE — 85027 COMPLETE CBC AUTOMATED: CPT

## 2019-03-10 PROCEDURE — A9270 NON-COVERED ITEM OR SERVICE: HCPCS | Performed by: NURSE PRACTITIONER

## 2019-03-10 PROCEDURE — 71045 X-RAY EXAM CHEST 1 VIEW: CPT

## 2019-03-10 PROCEDURE — 700112 HCHG RX REV CODE 229: Performed by: SURGERY

## 2019-03-10 PROCEDURE — 93010 ELECTROCARDIOGRAM REPORT: CPT | Performed by: INTERNAL MEDICINE

## 2019-03-10 PROCEDURE — 93306 TTE W/DOPPLER COMPLETE: CPT

## 2019-03-10 PROCEDURE — 80053 COMPREHEN METABOLIC PANEL: CPT

## 2019-03-10 PROCEDURE — 700102 HCHG RX REV CODE 250 W/ 637 OVERRIDE(OP): Performed by: NURSE PRACTITIONER

## 2019-03-10 PROCEDURE — 770022 HCHG ROOM/CARE - ICU (200)

## 2019-03-10 PROCEDURE — 85007 BL SMEAR W/DIFF WBC COUNT: CPT

## 2019-03-10 PROCEDURE — 93005 ELECTROCARDIOGRAM TRACING: CPT | Performed by: SURGERY

## 2019-03-10 PROCEDURE — 700111 HCHG RX REV CODE 636 W/ 250 OVERRIDE (IP): Performed by: NURSE PRACTITIONER

## 2019-03-10 PROCEDURE — 93306 TTE W/DOPPLER COMPLETE: CPT | Mod: 26 | Performed by: INTERNAL MEDICINE

## 2019-03-10 PROCEDURE — 700101 HCHG RX REV CODE 250: Performed by: NURSE PRACTITIONER

## 2019-03-10 RX ORDER — DEXTROSE MONOHYDRATE 50 MG/ML
INJECTION, SOLUTION INTRAVENOUS CONTINUOUS
Status: DISCONTINUED | OUTPATIENT
Start: 2019-03-10 | End: 2019-03-11

## 2019-03-10 RX ADMIN — HYDROMORPHONE HYDROCHLORIDE 1 MG: 2 INJECTION INTRAMUSCULAR; INTRAVENOUS; SUBCUTANEOUS at 11:30

## 2019-03-10 RX ADMIN — HYDROMORPHONE HYDROCHLORIDE 1 MG: 2 INJECTION INTRAMUSCULAR; INTRAVENOUS; SUBCUTANEOUS at 23:11

## 2019-03-10 RX ADMIN — ACETAMINOPHEN 1000 MG: 500 TABLET ORAL at 00:35

## 2019-03-10 RX ADMIN — DOCUSATE SODIUM 100 MG: 100 CAPSULE, LIQUID FILLED ORAL at 18:26

## 2019-03-10 RX ADMIN — HYDROMORPHONE HYDROCHLORIDE 1 MG: 2 INJECTION INTRAMUSCULAR; INTRAVENOUS; SUBCUTANEOUS at 06:08

## 2019-03-10 RX ADMIN — METAXALONE 800 MG: 800 TABLET ORAL at 18:27

## 2019-03-10 RX ADMIN — GABAPENTIN 300 MG: 300 CAPSULE ORAL at 18:26

## 2019-03-10 RX ADMIN — HYDROMORPHONE HYDROCHLORIDE 0.5 MG: 2 INJECTION INTRAMUSCULAR; INTRAVENOUS; SUBCUTANEOUS at 01:05

## 2019-03-10 RX ADMIN — GABAPENTIN 300 MG: 300 CAPSULE ORAL at 10:54

## 2019-03-10 RX ADMIN — LIDOCAINE 3 PATCH: 50 PATCH TOPICAL at 18:27

## 2019-03-10 RX ADMIN — DOCUSATE SODIUM 100 MG: 100 CAPSULE, LIQUID FILLED ORAL at 05:12

## 2019-03-10 RX ADMIN — METAXALONE 800 MG: 800 TABLET ORAL at 05:13

## 2019-03-10 RX ADMIN — ACETAMINOPHEN 1000 MG: 500 TABLET ORAL at 05:12

## 2019-03-10 RX ADMIN — AMIODARONE HYDROCHLORIDE 150 MG: 1.5 INJECTION, SOLUTION INTRAVENOUS at 03:37

## 2019-03-10 RX ADMIN — AMIODARONE HYDROCHLORIDE 0.5 MG/MIN: 50 INJECTION, SOLUTION INTRAVENOUS at 13:40

## 2019-03-10 RX ADMIN — MORPHINE SULFATE 45 MG: 15 TABLET, EXTENDED RELEASE ORAL at 18:27

## 2019-03-10 RX ADMIN — OXYCODONE HYDROCHLORIDE 15 MG: 5 TABLET ORAL at 15:40

## 2019-03-10 RX ADMIN — MORPHINE SULFATE 45 MG: 15 TABLET, EXTENDED RELEASE ORAL at 05:13

## 2019-03-10 RX ADMIN — OXYCODONE HYDROCHLORIDE 15 MG: 5 TABLET ORAL at 19:19

## 2019-03-10 RX ADMIN — AMIODARONE HYDROCHLORIDE 1 MG/MIN: 50 INJECTION, SOLUTION INTRAVENOUS at 04:47

## 2019-03-10 RX ADMIN — METAXALONE 800 MG: 800 TABLET ORAL at 10:54

## 2019-03-10 RX ADMIN — ACETAMINOPHEN 1000 MG: 500 TABLET ORAL at 18:26

## 2019-03-10 RX ADMIN — HYDROMORPHONE HYDROCHLORIDE 1 MG: 2 INJECTION INTRAMUSCULAR; INTRAVENOUS; SUBCUTANEOUS at 18:26

## 2019-03-10 RX ADMIN — ENOXAPARIN SODIUM 30 MG: 100 INJECTION SUBCUTANEOUS at 05:13

## 2019-03-10 RX ADMIN — ACETAMINOPHEN 1000 MG: 500 TABLET ORAL at 10:54

## 2019-03-10 RX ADMIN — OXYCODONE HYDROCHLORIDE 15 MG: 5 TABLET ORAL at 10:54

## 2019-03-10 RX ADMIN — DEXTROSE MONOHYDRATE: 50 INJECTION, SOLUTION INTRAVENOUS at 01:51

## 2019-03-10 RX ADMIN — GABAPENTIN 300 MG: 300 CAPSULE ORAL at 05:13

## 2019-03-10 RX ADMIN — CELECOXIB 200 MG: 200 CAPSULE ORAL at 18:27

## 2019-03-10 RX ADMIN — CELECOXIB 200 MG: 200 CAPSULE ORAL at 05:12

## 2019-03-10 RX ADMIN — HYDROMORPHONE HYDROCHLORIDE 1 MG: 2 INJECTION INTRAMUSCULAR; INTRAVENOUS; SUBCUTANEOUS at 16:27

## 2019-03-10 RX ADMIN — ENOXAPARIN SODIUM 30 MG: 100 INJECTION SUBCUTANEOUS at 18:26

## 2019-03-10 ASSESSMENT — ENCOUNTER SYMPTOMS
MYALGIAS: 1
SHORTNESS OF BREATH: 0

## 2019-03-10 NOTE — PROGRESS NOTES
Trauma / Surgical Daily Progress Note    Date of Service  3/10/2019    Chief Complaint  58 y.o. male admitted 2/27/2019 s/p fall from roof while working. Has injuries including flail chest, pneumothorax, pulmonary contusion and sternal fracture.  Interval Events  Hospital day # 12  Pt currently requires ICU care  Seen on rounds and discussed with multidisciplinary team  Physiologic derangements preclude floor transfer  Events and interventions include:  Transitioning to oral amiodarone-had recurrent a-fib last night-weaning ggt again  Aggressive pulmonary toilet-at high risk for decompensation    Review of Systems  Review of Systems   Respiratory: Negative for shortness of breath.    Musculoskeletal: Positive for myalgias.   All other systems reviewed and are negative.       Vital Signs for last 24 hours  Temp:  [36.6 °C (97.8 °F)-37 °C (98.6 °F)] 36.7 °C (98 °F)  Pulse:  [] 78  Resp:  [9-60] 24  SpO2:  [90 %-98 %] 95 %    Hemodynamic parameters for last 24 hours       Respiratory Data     Respiration: (!) 24, Pulse Oximetry: 95 %     Work Of Breathing / Effort: Mild  RUL Breath Sounds: Clear, RML Breath Sounds: Diminished, RLL Breath Sounds: Diminished, FATIMAH Breath Sounds: Diminished, LLL Breath Sounds: Diminished    Physical Exam  Physical Exam   Constitutional: He is oriented to person, place, and time. He appears well-developed and well-nourished. He appears distressed.   HENT:   Head: Normocephalic and atraumatic.   Eyes: Pupils are equal, round, and reactive to light. EOM are normal. Right eye exhibits no discharge. Left eye exhibits no discharge. No scleral icterus.   Neck: Normal range of motion. Neck supple. No JVD present. No tracheal deviation present.   Cardiovascular: Normal rate, regular rhythm, normal heart sounds and intact distal pulses.    Pulmonary/Chest: Effort normal. No respiratory distress. He has no wheezes.   Abdominal: Soft. Bowel sounds are normal. He exhibits no distension. There is  no tenderness. There is no rebound.   Musculoskeletal: Normal range of motion. He exhibits no edema or deformity.   Neurological: He is alert and oriented to person, place, and time. No cranial nerve deficit.   Skin: Skin is warm and dry. No erythema.   Psychiatric: He has a normal mood and affect. His behavior is normal. Thought content normal.       Laboratory  Recent Results (from the past 24 hour(s))   EKG    Collection Time: 19  7:30 PM   Result Value Ref Range    Report       [facility]    Test Date:  2019  Pt Name:    ASHWIN ONEIL              Department: 19  MRN:        [id number]                      Room:       [facility]  Gender:     Male                         Technician: SYDNEY  :        1960                   Requested By:JAIME TOWNSEND  Order #:    [id number]                    Shruti MD: Toy Eason MD    Measurements  Intervals                                Axis  Rate:       95                           P:          0  NC:         132                          QRS:        8  QRSD:       94                           T:          11  QT:         372  QTc:        468    Interpretive Statements  WANDERING PACEMAKER  CONSIDER RIGHT ATRIAL ABNORMALITY  EARLY PRECORDIAL R/S TRANSITION  ARTIFACT IN LEAD(S) I,II,III,aVR,aVL,V1,V2,V3,V4,V5,V6  Compared to ECG 2019 21:42:18  Wandering atrial pacemaker now present  Atrial fibrillation no longer present    Electronically Signed On 3-9-2019 20:25:15 PST by Toy Eason MD     EKG    Collection Time: 19  7:31 PM   Result Value Ref Range    Report       [facility]    Test Date:  2019  [facility]              [facility]  MRN:        [id number]                      Room:       [facility]  Gender:     Male                         Technician: EA  :        1960                   Requested By:  Order #:    [id number]                    Reading MD: Toy Eason  MD    Measurements  Intervals                                Axis  Rate:       86                           P:          16  IN:         160                          QRS:        7  QRSD:       98                           T:          11  QT:         368  QTc:        440    Interpretive Statements  SINUS RHYTHM  EARLY PRECORDIAL R/S TRANSITION  Compared to ECG 2019 19:30:35  Wandering atrial pacemaker no longer present    Electronically Signed On 3-9-2019 20:25:17 PST by Toy Eason MD     EKG    Collection Time: 03/10/19  1:19 AM   Result Value Ref Range    Report       [facility]    Test Date:  2019-03-10  Pt Name:    ASHWIN ONEIL              Department: 19  MRN:        [id number]                      Room:       [facility]  Gender:     Male                         Technician: SYDNEY  :        1960                   Requested By:GITA KELLY  Order #:    [id number]                    Reading MD: Gaurav La MD    Measurements  Intervals                                Axis  Rate:       109                          P:  IN:                                      QRS:        34  QRSD:       96                           T:          33  QT:         348  QTc:        469    Interpretive Statements  ATRIAL FIBRILLATION  EARLY PRECORDIAL R/S TRANSITION  Compared to ECG 2019 19:31:30  Sinus rhythm no longer present    Electronically Signed On 3- 13:59:01 PDT by Gaurav La MD     Comp Metabolic Panel    Collection Time: 03/10/19  5:05 AM   Result Value Ref Range    Sodium 139 135 - 145 mmol/L    Potassium 4.2 3.6 - 5.5 mmol/L    Chloride 104 96 - 112 mmol/L    Co2 26 20 - 33 mmol/L    Anion Gap 9.0 0.0 - 11.9    Glucose 110 (H) 65 - 99 mg/dL    Bun 11 8 - 22 mg/dL    Creatinine 0.89 0.50 - 1.40 mg/dL    Calcium 8.4 (L) 8.5 - 10.5 mg/dL    AST(SGOT) 16 12 - 45 U/L    ALT(SGPT) 16 2 - 50 U/L    Alkaline Phosphatase 94 30 - 99 U/L    Total Bilirubin 0.7 0.1 - 1.5 mg/dL    Albumin  3.3 3.2 - 4.9 g/dL    Total Protein 6.0 6.0 - 8.2 g/dL    Globulin 2.7 1.9 - 3.5 g/dL    A-G Ratio 1.2 g/dL   CBC WITH DIFFERENTIAL    Collection Time: 03/10/19  5:05 AM   Result Value Ref Range    WBC 6.3 4.8 - 10.8 K/uL    RBC 3.67 (L) 4.70 - 6.10 M/uL    Hemoglobin 11.9 (L) 14.0 - 18.0 g/dL    Hematocrit 35.2 (L) 42.0 - 52.0 %    MCV 95.9 81.4 - 97.8 fL    MCH 32.4 27.0 - 33.0 pg    MCHC 33.8 33.7 - 35.3 g/dL    RDW 44.9 35.9 - 50.0 fL    Platelet Count 232 164 - 446 K/uL    MPV 9.4 9.0 - 12.9 fL    Neutrophils-Polys 64.30 44.00 - 72.00 %    Lymphocytes 16.50 (L) 22.00 - 41.00 %    Monocytes 9.60 0.00 - 13.40 %    Eosinophils 3.50 0.00 - 6.90 %    Basophils 3.50 (H) 0.00 - 1.80 %    Nucleated RBC 0.00 /100 WBC    Neutrophils (Absolute) 4.05 1.82 - 7.42 K/uL    Lymphs (Absolute) 1.04 1.00 - 4.80 K/uL    Monos (Absolute) 0.60 0.00 - 0.85 K/uL    Eos (Absolute) 0.22 0.00 - 0.51 K/uL    Baso (Absolute) 0.22 (H) 0.00 - 0.12 K/uL    NRBC (Absolute) 0.00 K/uL   ESTIMATED GFR    Collection Time: 03/10/19  5:05 AM   Result Value Ref Range    GFR If African American >60 >60 mL/min/1.73 m 2    GFR If Non African American >60 >60 mL/min/1.73 m 2   DIFFERENTIAL MANUAL    Collection Time: 03/10/19  5:05 AM   Result Value Ref Range    Myelocytes 1.70 %    Progranulocytes 0.90 %    Manual Diff Status PERFORMED    PERIPHERAL SMEAR REVIEW    Collection Time: 03/10/19  5:05 AM   Result Value Ref Range    Peripheral Smear Review see below    PLATELET ESTIMATE    Collection Time: 03/10/19  5:05 AM   Result Value Ref Range    Plt Estimation Normal    MORPHOLOGY    Collection Time: 03/10/19  5:05 AM   Result Value Ref Range    RBC Morphology Normal        Fluids    Intake/Output Summary (Last 24 hours) at 03/10/19 1421  Last data filed at 03/10/19 1400   Gross per 24 hour   Intake          2677.18 ml   Output             2425 ml   Net           252.18 ml       Core Measures & Quality Metrics  Labs reviewed, Medications reviewed and  Radiology images reviewed  Robbins catheter: No Robbins      DVT Prophylaxis: Enoxaparin (Lovenox)  DVT prophylaxis - mechanical: SCDs  Ulcer prophylaxis: Not indicated        MAMI Score    ETOH Screening      Assessment/Plan  A-fib (HCC)- (present on admission)   Assessment & Plan    3/7 New onset afib- amio gtt initiated  3/8 Rebolused- converted to sinus  3/9 Transition to Amiodarone 400mg TID for 19 doses for 10g load-3/9 late episode of a-fib requiring iv bolus  3/17 Transitions to Amiodarone 200mg daily X 1 month if no further afib noted  Echo pending         Left pulmonary contusion- (present on admission)   Assessment & Plan    Supplemental oxygen to maintain SaO2 greater than 93%.  Aggressive pulmonary hygiene and serial chest radiography.     Traumatic pneumothorax and hemothorax- (present on admission)   Assessment & Plan    Left hemopneumothroax on CT.  Chest tube placed on arrival to ICU.  3/2 CT chest with retained hemothorax.  3/3 Thoracoscopy with evacuation of retained hemothorax.  3/6 To water seal.  3/7 CT-minimal effuson  3/9 Ct removed     Closed fracture of multiple ribs with flail chest- (present on admission)   Assessment & Plan    Anterior lateral left 1, 2, 3, 4, 5, 6, 7 rib fractures. Left posterior 6th - 12th  rib fractures. Disruption of the costochondral cartilage involving the left second, third, fourth, fifth, sixth ribs. Flail anterior lateral chest.  Aggressive pulmonary hygiene and multimodal pain management.     Anxiety   Assessment & Plan    Chronic condition treated with Paxil  Resumed maintenance medication.     Essential hypertension   Assessment & Plan    Chronic condition treated with Lisinopril  3/9 SBP <110 consistently  Hold Lisinopril at this time.     Closed fracture of sternum- (present on admission)   Assessment & Plan    Nondisplaced inferior sternal fracture. No retrosternal hematoma.  Aggressive pulmonary hygiene and multimodal pain management.     Chronic pain-  (present on admission)   Assessment & Plan    Premorbid condition treated with MS Contin 15 mg BID and Oxycodone 15 mg TID.  Home meds resumed.  3/2 Increase MS Contin to 30 BID.  3/5 Increased MS Contin to 45 BID.  3/9 Add skelaxin      Discharge planning issues- (present on admission)   Assessment & Plan    Admission date  2/27/2019  Transfer from SICU  3/1/2019  Transfer to SICU 3/7/2019  Transfer from SICU 3/9/2019  Rehab/SNF consult NA  Medically cleared for discharge NA - chest tube in place  Not discharged: NA  Reasons:  NA  Discharged Date NA     No contraindication to deep vein thrombosis (DVT) prophylaxis- (present on admission)   Assessment & Plan    Chemical DVT prophylaxis (Lovenox) initiated upon admission.  RAP score 5.  Ambulate TID.     H/O clavicle fracture- (present on admission)   Assessment & Plan    Old left clavicle fracture seen on CT chest.     Trauma- (present on admission)   Assessment & Plan    15 foot fall from roof onto ice chunks.  Trauma Red Activation.  Mike Barragan MD. Trauma Surgery.         Discussed patient condition with RN, RT and Pharmacy.

## 2019-03-10 NOTE — PROGRESS NOTES
0112- called for stat EKG due to rhythm changes-EKG results show A. Fib    0125- APRN called regarding results and hear rate in the 90-120s. Orders received to administer 1x 150 bolus dose of amiodarone IV and to keep in ICU overnight.     0145- pharmacy called, per pharmacist mixing bag now

## 2019-03-10 NOTE — ASSESSMENT & PLAN NOTE
Chronic condition treated with Lisinopril  3/9 SBP <110 consistently  Hold Lisinopril at this time.

## 2019-03-10 NOTE — PROGRESS NOTES
"Blood pressure 130/84, pulse 88, temperature 36.7 °C (98 °F), temperature source Temporal, resp. rate 17, height 1.854 m (6' 1\"), weight 102.5 kg (225 lb 15.5 oz), SpO2 95 %.    Patient cleared for transfer back to [facility] with cardiac monitor  Tertiary completed with no further findings    C/o rib pain  -CT removed today  -add skelaxin for multimodal therapy    Converted back to NSR  -EKG requested  -transition to Amio 400mg to complete load dose of 10g then transition to amio 200 daily    Ambulate    OT consult     Resume home Paxil  Hold home lisinopril  "

## 2019-03-10 NOTE — PROGRESS NOTES
APRN updated  at 0410 on pt remaining in A.Fib post-amiodarone bolus dose infusion. Orders received to restart amiodarone gtt without bolus and discontinue PO amiodarone at this time. Per APRN, okay to call for A.Fib updates.

## 2019-03-10 NOTE — CARE PLAN
Problem: Pain Management  Goal: Pain level will decrease to patient's comfort goal    Intervention: Follow pain managment plan developed in collaboration with patient and Interdisciplinary Team  Pain assessed during each patient encounter utilizing the numeric pain rating scale. Pt educated and updated on multimodal pain management regimen, updated on PRN medications available and appropriate indications, and assisted in non-pharmacological interventions as well.      Problem: Safety  Goal: Will remain free from falls    Intervention: Implement fall precautions  Pt educated to call for assistance prior to getting out of bed. Call button in reach, treaded slipper socks on pt at all times.

## 2019-03-11 ENCOUNTER — APPOINTMENT (OUTPATIENT)
Dept: RADIOLOGY | Facility: MEDICAL CENTER | Age: 59
DRG: 166 | End: 2019-03-11
Attending: SURGERY
Payer: COMMERCIAL

## 2019-03-11 LAB
ALBUMIN SERPL BCP-MCNC: 3.3 G/DL (ref 3.2–4.9)
ALBUMIN/GLOB SERPL: 1.4 G/DL
ALP SERPL-CCNC: 104 U/L (ref 30–99)
ALT SERPL-CCNC: 13 U/L (ref 2–50)
ANION GAP SERPL CALC-SCNC: 7 MMOL/L (ref 0–11.9)
AST SERPL-CCNC: 15 U/L (ref 12–45)
BASOPHILS # BLD AUTO: 1.1 % (ref 0–1.8)
BASOPHILS # BLD: 0.06 K/UL (ref 0–0.12)
BILIRUB SERPL-MCNC: 0.5 MG/DL (ref 0.1–1.5)
BUN SERPL-MCNC: 10 MG/DL (ref 8–22)
CALCIUM SERPL-MCNC: 8.4 MG/DL (ref 8.5–10.5)
CHLORIDE SERPL-SCNC: 105 MMOL/L (ref 96–112)
CO2 SERPL-SCNC: 27 MMOL/L (ref 20–33)
CREAT SERPL-MCNC: 0.92 MG/DL (ref 0.5–1.4)
EKG IMPRESSION: NORMAL
EOSINOPHIL # BLD AUTO: 0.27 K/UL (ref 0–0.51)
EOSINOPHIL NFR BLD: 5.1 % (ref 0–6.9)
ERYTHROCYTE [DISTWIDTH] IN BLOOD BY AUTOMATED COUNT: 45 FL (ref 35.9–50)
GLOBULIN SER CALC-MCNC: 2.3 G/DL (ref 1.9–3.5)
GLUCOSE SERPL-MCNC: 112 MG/DL (ref 65–99)
HCT VFR BLD AUTO: 31.9 % (ref 42–52)
HGB BLD-MCNC: 10.8 G/DL (ref 14–18)
IMM GRANULOCYTES # BLD AUTO: 0.11 K/UL (ref 0–0.11)
IMM GRANULOCYTES NFR BLD AUTO: 2.1 % (ref 0–0.9)
LYMPHOCYTES # BLD AUTO: 1.05 K/UL (ref 1–4.8)
LYMPHOCYTES NFR BLD: 19.7 % (ref 22–41)
MCH RBC QN AUTO: 32.5 PG (ref 27–33)
MCHC RBC AUTO-ENTMCNC: 33.9 G/DL (ref 33.7–35.3)
MCV RBC AUTO: 96.1 FL (ref 81.4–97.8)
MONOCYTES # BLD AUTO: 0.6 K/UL (ref 0–0.85)
MONOCYTES NFR BLD AUTO: 11.2 % (ref 0–13.4)
NEUTROPHILS # BLD AUTO: 3.25 K/UL (ref 1.82–7.42)
NEUTROPHILS NFR BLD: 60.8 % (ref 44–72)
NRBC # BLD AUTO: 0 K/UL
NRBC BLD-RTO: 0 /100 WBC
PLATELET # BLD AUTO: 245 K/UL (ref 164–446)
PMV BLD AUTO: 9 FL (ref 9–12.9)
POTASSIUM SERPL-SCNC: 3.9 MMOL/L (ref 3.6–5.5)
PROT SERPL-MCNC: 5.6 G/DL (ref 6–8.2)
RBC # BLD AUTO: 3.32 M/UL (ref 4.7–6.1)
SODIUM SERPL-SCNC: 139 MMOL/L (ref 135–145)
WBC # BLD AUTO: 5.3 K/UL (ref 4.8–10.8)

## 2019-03-11 PROCEDURE — 700101 HCHG RX REV CODE 250: Performed by: NURSE PRACTITIONER

## 2019-03-11 PROCEDURE — A9270 NON-COVERED ITEM OR SERVICE: HCPCS | Performed by: SURGERY

## 2019-03-11 PROCEDURE — 93010 ELECTROCARDIOGRAM REPORT: CPT | Performed by: INTERNAL MEDICINE

## 2019-03-11 PROCEDURE — 770022 HCHG ROOM/CARE - ICU (200)

## 2019-03-11 PROCEDURE — 700102 HCHG RX REV CODE 250 W/ 637 OVERRIDE(OP): Performed by: NURSE PRACTITIONER

## 2019-03-11 PROCEDURE — 93005 ELECTROCARDIOGRAM TRACING: CPT | Performed by: NURSE PRACTITIONER

## 2019-03-11 PROCEDURE — 700111 HCHG RX REV CODE 636 W/ 250 OVERRIDE (IP): Performed by: SURGERY

## 2019-03-11 PROCEDURE — 99233 SBSQ HOSP IP/OBS HIGH 50: CPT | Performed by: SURGERY

## 2019-03-11 PROCEDURE — A9270 NON-COVERED ITEM OR SERVICE: HCPCS | Performed by: NURSE PRACTITIONER

## 2019-03-11 PROCEDURE — 85025 COMPLETE CBC W/AUTO DIFF WBC: CPT

## 2019-03-11 PROCEDURE — 80053 COMPREHEN METABOLIC PANEL: CPT

## 2019-03-11 PROCEDURE — 700102 HCHG RX REV CODE 250 W/ 637 OVERRIDE(OP): Performed by: SURGERY

## 2019-03-11 PROCEDURE — 71045 X-RAY EXAM CHEST 1 VIEW: CPT

## 2019-03-11 PROCEDURE — 700112 HCHG RX REV CODE 229: Performed by: SURGERY

## 2019-03-11 PROCEDURE — 700111 HCHG RX REV CODE 636 W/ 250 OVERRIDE (IP): Performed by: NURSE PRACTITIONER

## 2019-03-11 PROCEDURE — 700105 HCHG RX REV CODE 258: Performed by: NURSE PRACTITIONER

## 2019-03-11 RX ORDER — AMIODARONE HYDROCHLORIDE 200 MG/1
200 TABLET ORAL
Status: DISCONTINUED | OUTPATIENT
Start: 2019-03-16 | End: 2019-03-12 | Stop reason: HOSPADM

## 2019-03-11 RX ORDER — AMIODARONE HYDROCHLORIDE 200 MG/1
400 TABLET ORAL 3 TIMES DAILY
Status: DISCONTINUED | OUTPATIENT
Start: 2019-03-12 | End: 2019-03-12 | Stop reason: HOSPADM

## 2019-03-11 RX ORDER — AMIODARONE HYDROCHLORIDE 200 MG/1
400 TABLET ORAL TWICE DAILY
Status: DISCONTINUED | OUTPATIENT
Start: 2019-03-11 | End: 2019-03-11

## 2019-03-11 RX ADMIN — METAXALONE 800 MG: 800 TABLET ORAL at 16:14

## 2019-03-11 RX ADMIN — LIDOCAINE 3 PATCH: 50 PATCH TOPICAL at 16:15

## 2019-03-11 RX ADMIN — GABAPENTIN 300 MG: 300 CAPSULE ORAL at 16:14

## 2019-03-11 RX ADMIN — METAXALONE 800 MG: 800 TABLET ORAL at 05:01

## 2019-03-11 RX ADMIN — ACETAMINOPHEN 1000 MG: 500 TABLET ORAL at 16:13

## 2019-03-11 RX ADMIN — MORPHINE SULFATE 45 MG: 15 TABLET, EXTENDED RELEASE ORAL at 16:13

## 2019-03-11 RX ADMIN — ACETAMINOPHEN 1000 MG: 500 TABLET ORAL at 05:00

## 2019-03-11 RX ADMIN — METAXALONE 800 MG: 800 TABLET ORAL at 11:05

## 2019-03-11 RX ADMIN — OXYCODONE HYDROCHLORIDE 15 MG: 5 TABLET ORAL at 12:35

## 2019-03-11 RX ADMIN — OXYCODONE HYDROCHLORIDE 15 MG: 5 TABLET ORAL at 15:39

## 2019-03-11 RX ADMIN — GABAPENTIN 300 MG: 300 CAPSULE ORAL at 05:01

## 2019-03-11 RX ADMIN — CELECOXIB 200 MG: 200 CAPSULE ORAL at 05:01

## 2019-03-11 RX ADMIN — ENOXAPARIN SODIUM 30 MG: 100 INJECTION SUBCUTANEOUS at 05:01

## 2019-03-11 RX ADMIN — HYDROMORPHONE HYDROCHLORIDE 0.5 MG: 2 INJECTION INTRAMUSCULAR; INTRAVENOUS; SUBCUTANEOUS at 22:05

## 2019-03-11 RX ADMIN — ENOXAPARIN SODIUM 30 MG: 100 INJECTION SUBCUTANEOUS at 16:16

## 2019-03-11 RX ADMIN — AMIODARONE HYDROCHLORIDE 400 MG: 200 TABLET ORAL at 11:05

## 2019-03-11 RX ADMIN — AMIODARONE HYDROCHLORIDE 400 MG: 200 TABLET ORAL at 16:14

## 2019-03-11 RX ADMIN — HYDROMORPHONE HYDROCHLORIDE 1 MG: 2 INJECTION INTRAMUSCULAR; INTRAVENOUS; SUBCUTANEOUS at 09:48

## 2019-03-11 RX ADMIN — DOCUSATE SODIUM 100 MG: 100 CAPSULE, LIQUID FILLED ORAL at 16:14

## 2019-03-11 RX ADMIN — OXYCODONE HYDROCHLORIDE 15 MG: 5 TABLET ORAL at 20:34

## 2019-03-11 RX ADMIN — POLYETHYLENE GLYCOL 3350 1 PACKET: 17 POWDER, FOR SOLUTION ORAL at 16:13

## 2019-03-11 RX ADMIN — OXYCODONE HYDROCHLORIDE 15 MG: 5 TABLET ORAL at 08:29

## 2019-03-11 RX ADMIN — OXYCODONE HYDROCHLORIDE 15 MG: 5 TABLET ORAL at 02:23

## 2019-03-11 RX ADMIN — ACETAMINOPHEN 1000 MG: 500 TABLET ORAL at 11:05

## 2019-03-11 RX ADMIN — CELECOXIB 200 MG: 200 CAPSULE ORAL at 16:14

## 2019-03-11 RX ADMIN — GABAPENTIN 300 MG: 300 CAPSULE ORAL at 11:05

## 2019-03-11 RX ADMIN — MORPHINE SULFATE 45 MG: 15 TABLET, EXTENDED RELEASE ORAL at 05:01

## 2019-03-11 RX ADMIN — AMIODARONE HYDROCHLORIDE 0.5 MG/MIN: 50 INJECTION, SOLUTION INTRAVENOUS at 04:13

## 2019-03-11 RX ADMIN — ACETAMINOPHEN 1000 MG: 500 TABLET ORAL at 23:09

## 2019-03-11 ASSESSMENT — ENCOUNTER SYMPTOMS
FOCAL WEAKNESS: 0
SENSORY CHANGE: 0
COUGH: 0
TINGLING: 0
DOUBLE VISION: 0
ABDOMINAL PAIN: 0
MYALGIAS: 1
NAUSEA: 0
VOMITING: 0
FEVER: 0
CHILLS: 0
SHORTNESS OF BREATH: 0
ROS GI COMMENTS: 3/10 BM
BLURRED VISION: 0

## 2019-03-11 NOTE — CARE PLAN
Problem: Safety  Goal: Free from accidental injury  Outcome: PROGRESSING AS EXPECTED  Intervention:   Pt call light and personal belongings within reach, monitor on and patient’s cardiac rhythm being monitored.   Bed in low position, non skid socks on, bed alarm on.  Pt currently able to communicate need for assistance.   Pt near nursing station.    Problem: Knowledge Deficit  Goal:Patient/Significant other demonstrates understanding of disease process, treatment plan, medications and discharge instructions  Outcome: PROGRESSING AS EXPECTED  Interventions:  Plan of care discussed with pt.   Questions regarding importance of IS addressed with pt.   Considered cultural and educational background when teaching care plan.   Questions on plan of care encouraged throughout the shift.     Problem: Pain  Goal: Alleviation of Pain or a reduction in pain to the patient’s comfort goal  Outcome: PROGRESSING AS EXPECTED  Interventions:   0-10 pain scale used to assess pain every 4 hours and PRN.   Non-pharmacological pain measures taken such as rest, heat pack, cold pack.

## 2019-03-11 NOTE — PROGRESS NOTES
Trauma / Surgical Daily Progress Note    Date of Service  3/11/2019    Chief Complaint  58 y.o. male admitted 2/27/2019 with Trauma - fall from roof with blunt chest injuries    Interval Events  Transition to oral amiodarone   Pain control adequate    - Continue cardiac monitoring  - Clinically stable to transfer to surgical [facility] once stable in sinus rhythm on oral amiodarone    Review of Systems  Review of Systems   Constitutional: Negative for chills and fever.   Eyes: Negative for blurred vision and double vision.   Respiratory: Negative for cough and shortness of breath.    Cardiovascular: Negative for chest pain.   Gastrointestinal: Negative for abdominal pain, nausea and vomiting.        3/10 BM   Genitourinary:        Voiding   Musculoskeletal: Positive for myalgias.   Neurological: Negative for tingling, sensory change and focal weakness.        Vital Signs  Temp:  [36.4 °C (97.6 °F)-37.1 °C (98.8 °F)] 36.9 °C (98.4 °F)  Pulse:  [68-79] 73  Resp:  [11-29] 20  SpO2:  [88 %-99 %] 96 %    Physical Exam  Physical Exam   Constitutional: He is oriented to person, place, and time. He appears well-developed. No distress.   HENT:   Head: Normocephalic.   Eyes: Conjunctivae are normal.   Neck: No JVD present. No tracheal deviation present.   Cardiovascular: Normal rate and intact distal pulses.    Pulmonary/Chest: Effort normal. No respiratory distress. He has no wheezes. He exhibits tenderness.   Left chest tube removal site with gauze dressing intact  IS 2500   Abdominal: Soft. He exhibits no distension. There is no tenderness.   Musculoskeletal:   Moves all extremities   Neurological: He is alert and oriented to person, place, and time.   Skin: Skin is warm and dry.   Nursing note and vitals reviewed.      Laboratory  Recent Results (from the past 24 hour(s))   EC-ECHOCARDIOGRAM COMPLETE W/O CONT    Collection Time: 03/10/19  5:17 PM   Result Value Ref Range    Eject.Frac. MOD BP 61.18     Eject.Frac. MOD 4C 71.04      Eject.Frac. MOD 2C 43.83    Comp Metabolic Panel    Collection Time: 03/11/19  4:33 AM   Result Value Ref Range    Sodium 139 135 - 145 mmol/L    Potassium 3.9 3.6 - 5.5 mmol/L    Chloride 105 96 - 112 mmol/L    Co2 27 20 - 33 mmol/L    Anion Gap 7.0 0.0 - 11.9    Glucose 112 (H) 65 - 99 mg/dL    Bun 10 8 - 22 mg/dL    Creatinine 0.92 0.50 - 1.40 mg/dL    Calcium 8.4 (L) 8.5 - 10.5 mg/dL    AST(SGOT) 15 12 - 45 U/L    ALT(SGPT) 13 2 - 50 U/L    Alkaline Phosphatase 104 (H) 30 - 99 U/L    Total Bilirubin 0.5 0.1 - 1.5 mg/dL    Albumin 3.3 3.2 - 4.9 g/dL    Total Protein 5.6 (L) 6.0 - 8.2 g/dL    Globulin 2.3 1.9 - 3.5 g/dL    A-G Ratio 1.4 g/dL   CBC WITH DIFFERENTIAL    Collection Time: 03/11/19  4:33 AM   Result Value Ref Range    WBC 5.3 4.8 - 10.8 K/uL    RBC 3.32 (L) 4.70 - 6.10 M/uL    Hemoglobin 10.8 (L) 14.0 - 18.0 g/dL    Hematocrit 31.9 (L) 42.0 - 52.0 %    MCV 96.1 81.4 - 97.8 fL    MCH 32.5 27.0 - 33.0 pg    MCHC 33.9 33.7 - 35.3 g/dL    RDW 45.0 35.9 - 50.0 fL    Platelet Count 245 164 - 446 K/uL    MPV 9.0 9.0 - 12.9 fL    Neutrophils-Polys 60.80 44.00 - 72.00 %    Lymphocytes 19.70 (L) 22.00 - 41.00 %    Monocytes 11.20 0.00 - 13.40 %    Eosinophils 5.10 0.00 - 6.90 %    Basophils 1.10 0.00 - 1.80 %    Immature Granulocytes 2.10 (H) 0.00 - 0.90 %    Nucleated RBC 0.00 /100 WBC    Neutrophils (Absolute) 3.25 1.82 - 7.42 K/uL    Lymphs (Absolute) 1.05 1.00 - 4.80 K/uL    Monos (Absolute) 0.60 0.00 - 0.85 K/uL    Eos (Absolute) 0.27 0.00 - 0.51 K/uL    Baso (Absolute) 0.06 0.00 - 0.12 K/uL    Immature Granulocytes (abs) 0.11 0.00 - 0.11 K/uL    NRBC (Absolute) 0.00 K/uL   ESTIMATED GFR    Collection Time: 03/11/19  4:33 AM   Result Value Ref Range    GFR If African American >60 >60 mL/min/1.73 m 2    GFR If Non African American >60 >60 mL/min/1.73 m 2       Fluids    Intake/Output Summary (Last 24 hours) at 03/11/19 1153  Last data filed at 03/11/19 0800   Gross per 24 hour   Intake          2736.53 ml    Output             3000 ml   Net          -263.47 ml       Core Measures & Quality Metrics  Labs reviewed, Medications reviewed and Radiology images reviewed  Robbins catheter: No Robbins      DVT Prophylaxis: Enoxaparin (Lovenox)  DVT prophylaxis - mechanical: SCDs  Ulcer prophylaxis: Not indicated        Total Score: 5    ETOH Screening  CAGE Score: 1  Intervention complete date: 3/4/2019  Patient response to intervention: Denies substance abuse.  Intervention not indicated .   Patient demonstrats understanding of intervention.Plan of care: No further acute intervention.         Assessment/Plan  A-fib (HCC)- (present on admission)   Assessment & Plan    3/7 New onset afib - amiodarone drip initiated  3/8 Rebolused, converted to sinus  3/9 Transition to Amiodarone 400mg TID for 19 doses for 10g load  - late episode of a-fib requiring iv bolus and drip  3/10 Echocardiogram completed  3/11 Transition to oral Amiodarone     Left pulmonary contusion- (present on admission)   Assessment & Plan    Supplemental oxygen to maintain SaO2 greater than 93%.  Aggressive pulmonary hygiene and serial chest radiography.      Traumatic pneumothorax and hemothorax- (present on admission)   Assessment & Plan    Left hemopneumothroax on CT.  Chest tube placed on arrival to ICU.  3/2 CT chest with retained hemothorax.  3/3 Thoracoscopy with evacuation of retained hemothorax.  3/6 To water seal  3/9 Chest tube removed     Closed fracture of multiple ribs with flail chest- (present on admission)   Assessment & Plan    Anterior lateral left 1, 2, 3, 4, 5, 6, 7 rib fractures. Left posterior 6th - 12th  rib fractures. Disruption of the costochondral cartilage involving the left second, third, fourth, fifth, sixth ribs. Flail anterior lateral chest.  Aggressive pulmonary hygiene and multimodal pain management.      Anxiety- (present on admission)   Assessment & Plan    Chronic condition treated with Paxil  Resumed maintenance medication.       Essential hypertension- (present on admission)   Assessment & Plan    Chronic condition treated with Lisinopril  3/9 SBP <110 consistently  Hold Lisinopril at this time.      Closed fracture of sternum- (present on admission)   Assessment & Plan    Nondisplaced inferior sternal fracture. No retrosternal hematoma.  Aggressive pulmonary hygiene and multimodal pain management.      Chronic pain- (present on admission)   Assessment & Plan    Premorbid condition treated with MS Contin 15 mg BID and Oxycodone 15 mg TID.  Home meds resumed.  3/2 Increase MS Contin to 30 BID.  3/5 Increased MS Contin to 45 BID.  3/9 Add skelaxin       Discharge planning issues- (present on admission)   Assessment & Plan    Admission date  2/27/2019  Transfer from SICU  3/1/2019  Transfer to SICU 3/7/2019  Transfer from SICU 3/9/2019  Rehab/SNF consult NA  Medically cleared for discharge NA - managing a-fib  Not discharged: NA  Reasons:  NA  Discharged Date NA     No contraindication to deep vein thrombosis (DVT) prophylaxis- (present on admission)   Assessment & Plan    Chemical DVT prophylaxis (Lovenox) initiated upon admission.  RAP score 5.  Ambulate TID.      H/O clavicle fracture- (present on admission)   Assessment & Plan    Old left clavicle fracture seen on CT chest.      Trauma- (present on admission)   Assessment & Plan    15 foot fall from roof onto ice chunks.  Trauma Red Activation.  Mike Barragan MD. Trauma Surgery.          Discussed patient condition with RN, Patient and trauma surgery, Dr. Aguayo.    Seen  Stable  Weaning off amiodarone  Ok to floor when off drip  Discussed with RN, RT, pharmacy and Raquel Aguayo MD

## 2019-03-11 NOTE — CARE PLAN
Problem: Pain Management  Goal: Pain level will decrease to patient's comfort goal    Intervention: Follow pain managment plan developed in collaboration with patient and Interdisciplinary Team  Pain assessed during each pt encounter utilizing the numeric pain rating scale. Multimodal pain management system in place and pt educated on PRN pain medications options and their appropriate indications. Pt also educated in non-pharmacological interventions to decrease pain.       Problem: Safety  Goal: Will remain free from falls    Intervention: Implement fall precautions  Call button in reach, non skid socks on pt, pt educated to call prior to getting out of bed.

## 2019-03-11 NOTE — PROGRESS NOTES
2 RN skin check    - Bruising on left injured side  - no pressure injury  - additionally some abrasions that seem to be secondary to tape from previous chest tube dressings; open to air

## 2019-03-11 NOTE — DISCHARGE PLANNING
Care Transition Team Assessment    In the case of an emergency, pt's legal NOK is Diego Saunders (son) [phone]    LSW met with pt at bedside and obtained the information used in this assessment. Pt verified accuracy of facesheet. Pt lives in a one story house by himself.  Pt uses [facility] pharmacy. Prior to current hospitalization, pt was completely independent in ADLS/IADLS. Pt drives and is able to attend necessary MD appointments. Pt is self-employed and has no financial concerns. Pt has a good support system. Pt denies any hx of substance use and denies any dx of mh.         Information Source  Orientation : Oriented x 4  Information Given By: Patient  Who is responsible for making decisions for patient? : Patient    Readmission Evaluation  Is this a readmission?: No    Elopement Risk  Legal Hold: No  Ambulatory or Self Mobile in Wheelchair: Yes  Disoriented: No  Psychiatric Symptoms: None  History of Wandering: No  Elopement this Admit: No  Vocalizing Wanting to Leave: No  Displays Behaviors, Body Language Wanting to Leave: No-Not at Risk for Elopement  Elopement Risk: Not at Risk for Elopement    Interdisciplinary Discharge Planning  Lives with - Patient's Self Care Capacity: Unrelated Adult  Patient or legal guardian wants to designate a caregiver (see row info): No  Housing / Facility: 1 Story House  Prior Services: None    Discharge Preparedness  What is your plan after discharge?: Uncertain - pending medical team collaboration  What are your discharge supports?: Child  Prior Functional Level: Ambulatory, Drives Self, Independent with Activities of Daily Living, Independent with Medication Management  Difficulity with ADLs: Walking  Difficulity with IADLs: Cooking, Driving, Laundry, Shopping    Functional Assesment  Prior Functional Level: Ambulatory, Drives Self, Independent with Activities of Daily Living, Independent with Medication Management    Finances  Financial Barriers to Discharge:  No  Prescription Coverage: Yes    Vision / Hearing Impairment  Vision Impairment : Yes (reading glasses)  Hearing Impairment : No         Advance Directive  Advance Directive?: None    Domestic Abuse  Have you ever been the victim of abuse or violence?: No  Physical Abuse or Sexual Abuse: No  Verbal Abuse or Emotional Abuse: No  Possible Abuse Reported to:: Not Applicable    Psychological Assessment  History of Substance Abuse: None  History of Psychiatric Problems: No  Non-compliant with Treatment: No  Newly Diagnosed Illness: Yes    Discharge Risks or Barriers  Discharge risks or barriers?: No PCP, Post-acute placement / services, Complex medical needs  Patient risk factors: Complex medical needs    Anticipated Discharge Information  Anticipated discharge disposition: Discharge needs currently unknown, DME, HHC, Home

## 2019-03-12 ENCOUNTER — APPOINTMENT (OUTPATIENT)
Dept: RADIOLOGY | Facility: MEDICAL CENTER | Age: 59
DRG: 166 | End: 2019-03-12
Attending: SURGERY
Payer: COMMERCIAL

## 2019-03-12 VITALS
HEIGHT: 73 IN | HEART RATE: 75 BPM | DIASTOLIC BLOOD PRESSURE: 84 MMHG | TEMPERATURE: 98.5 F | WEIGHT: 216.27 LBS | BODY MASS INDEX: 28.66 KG/M2 | RESPIRATION RATE: 33 BRPM | SYSTOLIC BLOOD PRESSURE: 130 MMHG | OXYGEN SATURATION: 94 %

## 2019-03-12 LAB
ALBUMIN SERPL BCP-MCNC: 3.3 G/DL (ref 3.2–4.9)
ALBUMIN/GLOB SERPL: 1.2 G/DL
ALP SERPL-CCNC: 110 U/L (ref 30–99)
ALT SERPL-CCNC: 12 U/L (ref 2–50)
ANION GAP SERPL CALC-SCNC: 8 MMOL/L (ref 0–11.9)
AST SERPL-CCNC: 13 U/L (ref 12–45)
BASOPHILS # BLD AUTO: 1.1 % (ref 0–1.8)
BASOPHILS # BLD: 0.06 K/UL (ref 0–0.12)
BILIRUB SERPL-MCNC: 0.5 MG/DL (ref 0.1–1.5)
BUN SERPL-MCNC: 10 MG/DL (ref 8–22)
CALCIUM SERPL-MCNC: 8.6 MG/DL (ref 8.5–10.5)
CHLORIDE SERPL-SCNC: 107 MMOL/L (ref 96–112)
CO2 SERPL-SCNC: 24 MMOL/L (ref 20–33)
CREAT SERPL-MCNC: 0.93 MG/DL (ref 0.5–1.4)
EOSINOPHIL # BLD AUTO: 0.24 K/UL (ref 0–0.51)
EOSINOPHIL NFR BLD: 4.5 % (ref 0–6.9)
ERYTHROCYTE [DISTWIDTH] IN BLOOD BY AUTOMATED COUNT: 44.8 FL (ref 35.9–50)
GLOBULIN SER CALC-MCNC: 2.7 G/DL (ref 1.9–3.5)
GLUCOSE SERPL-MCNC: 90 MG/DL (ref 65–99)
HCT VFR BLD AUTO: 33.4 % (ref 42–52)
HGB BLD-MCNC: 11.5 G/DL (ref 14–18)
IMM GRANULOCYTES # BLD AUTO: 0.08 K/UL (ref 0–0.11)
IMM GRANULOCYTES NFR BLD AUTO: 1.5 % (ref 0–0.9)
LYMPHOCYTES # BLD AUTO: 1.03 K/UL (ref 1–4.8)
LYMPHOCYTES NFR BLD: 19.3 % (ref 22–41)
MCH RBC QN AUTO: 32.7 PG (ref 27–33)
MCHC RBC AUTO-ENTMCNC: 34.4 G/DL (ref 33.7–35.3)
MCV RBC AUTO: 94.9 FL (ref 81.4–97.8)
MONOCYTES # BLD AUTO: 0.53 K/UL (ref 0–0.85)
MONOCYTES NFR BLD AUTO: 9.9 % (ref 0–13.4)
NEUTROPHILS # BLD AUTO: 3.39 K/UL (ref 1.82–7.42)
NEUTROPHILS NFR BLD: 63.7 % (ref 44–72)
NRBC # BLD AUTO: 0 K/UL
NRBC BLD-RTO: 0 /100 WBC
PLATELET # BLD AUTO: 227 K/UL (ref 164–446)
PMV BLD AUTO: 9 FL (ref 9–12.9)
POTASSIUM SERPL-SCNC: 4.1 MMOL/L (ref 3.6–5.5)
PROT SERPL-MCNC: 6 G/DL (ref 6–8.2)
RBC # BLD AUTO: 3.52 M/UL (ref 4.7–6.1)
SODIUM SERPL-SCNC: 139 MMOL/L (ref 135–145)
WBC # BLD AUTO: 5.3 K/UL (ref 4.8–10.8)

## 2019-03-12 PROCEDURE — 700111 HCHG RX REV CODE 636 W/ 250 OVERRIDE (IP): Performed by: SURGERY

## 2019-03-12 PROCEDURE — A9270 NON-COVERED ITEM OR SERVICE: HCPCS | Performed by: SURGERY

## 2019-03-12 PROCEDURE — 71045 X-RAY EXAM CHEST 1 VIEW: CPT

## 2019-03-12 PROCEDURE — 85025 COMPLETE CBC W/AUTO DIFF WBC: CPT

## 2019-03-12 PROCEDURE — 700112 HCHG RX REV CODE 229: Performed by: SURGERY

## 2019-03-12 PROCEDURE — 700102 HCHG RX REV CODE 250 W/ 637 OVERRIDE(OP): Performed by: NURSE PRACTITIONER

## 2019-03-12 PROCEDURE — 80053 COMPREHEN METABOLIC PANEL: CPT

## 2019-03-12 PROCEDURE — A9270 NON-COVERED ITEM OR SERVICE: HCPCS | Performed by: NURSE PRACTITIONER

## 2019-03-12 PROCEDURE — 700102 HCHG RX REV CODE 250 W/ 637 OVERRIDE(OP): Performed by: SURGERY

## 2019-03-12 PROCEDURE — 99238 HOSP IP/OBS DSCHRG MGMT 30/<: CPT | Performed by: SURGERY

## 2019-03-12 RX ORDER — LIDOCAINE 50 MG/G
3 PATCH TOPICAL EVERY 24 HOURS
Qty: 10 PATCH | Refills: 0 | Status: SHIPPED | OUTPATIENT
Start: 2019-03-12

## 2019-03-12 RX ORDER — GABAPENTIN 300 MG/1
300 CAPSULE ORAL 3 TIMES DAILY
Qty: 90 CAP | Refills: 0 | Status: SHIPPED | OUTPATIENT
Start: 2019-03-12

## 2019-03-12 RX ORDER — AMIODARONE HYDROCHLORIDE 200 MG/1
200 TABLET ORAL DAILY
Qty: 30 TAB | Refills: 0 | Status: ON HOLD | OUTPATIENT
Start: 2019-03-16 | End: 2022-02-09

## 2019-03-12 RX ORDER — CELECOXIB 200 MG/1
200 CAPSULE ORAL 2 TIMES DAILY
Qty: 60 CAP | Refills: 0 | Status: SHIPPED | OUTPATIENT
Start: 2019-03-12 | End: 2019-04-11

## 2019-03-12 RX ORDER — OXYCODONE HYDROCHLORIDE 15 MG/1
15 TABLET ORAL EVERY 6 HOURS PRN
Qty: 16 TAB | Refills: 0 | Status: SHIPPED | OUTPATIENT
Start: 2019-03-12 | End: 2019-03-16

## 2019-03-12 RX ORDER — METAXALONE 800 MG/1
800 TABLET ORAL 3 TIMES DAILY
Qty: 90 TAB | Refills: 0 | Status: ON HOLD | OUTPATIENT
Start: 2019-03-12 | End: 2022-02-09

## 2019-03-12 RX ORDER — AMIODARONE HYDROCHLORIDE 400 MG/1
400 TABLET ORAL 3 TIMES DAILY
Qty: 10 TAB | Refills: 0 | Status: SHIPPED | OUTPATIENT
Start: 2019-03-12 | End: 2019-03-16

## 2019-03-12 RX ADMIN — OXYCODONE HYDROCHLORIDE 15 MG: 5 TABLET ORAL at 10:26

## 2019-03-12 RX ADMIN — GABAPENTIN 300 MG: 300 CAPSULE ORAL at 11:55

## 2019-03-12 RX ADMIN — ACETAMINOPHEN 1000 MG: 500 TABLET ORAL at 11:55

## 2019-03-12 RX ADMIN — DOCUSATE SODIUM 100 MG: 100 CAPSULE, LIQUID FILLED ORAL at 05:06

## 2019-03-12 RX ADMIN — CELECOXIB 200 MG: 200 CAPSULE ORAL at 05:06

## 2019-03-12 RX ADMIN — GABAPENTIN 300 MG: 300 CAPSULE ORAL at 05:05

## 2019-03-12 RX ADMIN — AMIODARONE HYDROCHLORIDE 400 MG: 200 TABLET ORAL at 11:55

## 2019-03-12 RX ADMIN — AMIODARONE HYDROCHLORIDE 400 MG: 200 TABLET ORAL at 05:06

## 2019-03-12 RX ADMIN — METAXALONE 800 MG: 800 TABLET ORAL at 11:55

## 2019-03-12 RX ADMIN — OXYCODONE HYDROCHLORIDE 15 MG: 5 TABLET ORAL at 00:32

## 2019-03-12 RX ADMIN — ENOXAPARIN SODIUM 30 MG: 100 INJECTION SUBCUTANEOUS at 05:05

## 2019-03-12 RX ADMIN — MORPHINE SULFATE 45 MG: 15 TABLET, EXTENDED RELEASE ORAL at 05:05

## 2019-03-12 RX ADMIN — ACETAMINOPHEN 1000 MG: 500 TABLET ORAL at 05:06

## 2019-03-12 RX ADMIN — METAXALONE 800 MG: 800 TABLET ORAL at 06:00

## 2019-03-12 ASSESSMENT — ENCOUNTER SYMPTOMS
RESPIRATORY NEGATIVE: 1
MYALGIAS: 1
ROS GI COMMENTS: BM 3/12
NEUROLOGICAL NEGATIVE: 1
CONSTITUTIONAL NEGATIVE: 1

## 2019-03-12 NOTE — CARE PLAN
Problem: Knowledge Deficit  Goal: Knowledge of the prescribed therapeutic regimen will improve  Outcome: MET Date Met: 03/12/19      Problem: Discharge Barriers/Planning  Goal: Patient's continuum of care needs will be met  Outcome: MET Date Met: 03/12/19  Pt to be discharged home. Friend will pick patient up at 1300.

## 2019-03-12 NOTE — PROGRESS NOTES
Trauma / Surgical Daily Progress Note    Date of Service  3/12/2019    Chief Complaint  58 y.o. male admitted 2/27/2019 was a trauma red with blunt chest trauma  POD # 9 - VATS    Interval Events  SR on oral amiodarone  Adequate pain control  Room air  Tolerating diet  Tertiary complete - no further findings  Home with RX and instructions - discussed with Dr. Barragan     ** Pain management clinic contacted, follow up appointment made for Friday,  reviewed - he should have enough MS contin to last until Friday (last fill 2/12) and he was provided a small RX for oxy IR (last fill 2/1). This was discussed in great detail with patient and the clinic. **    Review of Systems  Review of Systems   Constitutional: Negative.    Respiratory: Negative.    Gastrointestinal:        BM 3/12   Genitourinary:        Voiding   Musculoskeletal: Positive for myalgias.   Neurological: Negative.    All other systems reviewed and are negative.       Vital Signs  Temp:  [36.7 °C (98.1 °F)-37 °C (98.6 °F)] 36.9 °C (98.5 °F)  Pulse:  [64-75] 75  Resp:  [7-52] 33  SpO2:  [88 %-97 %] 94 %    Physical Exam  Physical Exam   Constitutional: He is oriented to person, place, and time. He appears well-developed and well-nourished. No distress.   Pulmonary/Chest: Effort normal and breath sounds normal. No respiratory distress. He exhibits tenderness.   Abdominal: Soft. There is no tenderness.   Musculoskeletal: Normal range of motion.   Neurological: He is alert and oriented to person, place, and time.   Skin: Skin is warm and dry.   Psychiatric: He has a normal mood and affect.   Nursing note and vitals reviewed.      Laboratory  Recent Results (from the past 24 hour(s))   EKG    Collection Time: 03/11/19  8:31 PM   Result Value Ref Range    Report       [facility]    Test Date:  2019-03-11  Pt Name:    ASHWIN ONEIL              Department: 19  MRN:        [id number]                      Room:       S102  Gender:     Male                          Technician: LINSEY  :        [id number]                   Requested By:JAIME TOWNSEND  Order #:    [id number]                    Reading MD: Roxi Jones MD    Measurements  Intervals                                Axis  Rate:       66                           P:          24  WV:         181                          QRS:        15  QRSD:       106                          T:          15  QT:         431  QTc:        452    Interpretive Statements  SINUS RHYTHM  Compared to ECG 03/10/2019 01:19:54  Atrial fibrillation no longer present    Electronically Signed On 3- 21:58:53 PDT by Roxi Jones MD     Comp Metabolic Panel    Collection Time: 19  5:00 AM   Result Value Ref Range    Sodium 139 135 - 145 mmol/L    Potassium 4.1 3.6 - 5.5 mmol/L    Chloride 107 96 - 112 mmol/L    Co2 24 20 - 33 mmol/L    Anion Gap 8.0 0.0 - 11.9    Glucose 90 65 - 99 mg/dL    Bun 10 8 - 22 mg/dL    Creatinine 0.93 0.50 - 1.40 mg/dL    Calcium 8.6 8.5 - 10.5 mg/dL    AST(SGOT) 13 12 - 45 U/L    ALT(SGPT) 12 2 - 50 U/L    Alkaline Phosphatase 110 (H) 30 - 99 U/L    Total Bilirubin 0.5 0.1 - 1.5 mg/dL    Albumin 3.3 3.2 - 4.9 g/dL    Total Protein 6.0 6.0 - 8.2 g/dL    Globulin 2.7 1.9 - 3.5 g/dL    A-G Ratio 1.2 g/dL   CBC WITH DIFFERENTIAL    Collection Time: 19  5:00 AM   Result Value Ref Range    WBC 5.3 4.8 - 10.8 K/uL    RBC 3.52 (L) 4.70 - 6.10 M/uL    Hemoglobin 11.5 (L) 14.0 - 18.0 g/dL    Hematocrit 33.4 (L) 42.0 - 52.0 %    MCV 94.9 81.4 - 97.8 fL    MCH 32.7 27.0 - 33.0 pg    MCHC 34.4 33.7 - 35.3 g/dL    RDW 44.8 35.9 - 50.0 fL    Platelet Count 227 164 - 446 K/uL    MPV 9.0 9.0 - 12.9 fL    Neutrophils-Polys 63.70 44.00 - 72.00 %    Lymphocytes 19.30 (L) 22.00 - 41.00 %    Monocytes 9.90 0.00 - 13.40 %    Eosinophils 4.50 0.00 - 6.90 %    Basophils 1.10 0.00 - 1.80 %    Immature Granulocytes 1.50 (H) 0.00 - 0.90 %    Nucleated RBC 0.00 /100 WBC    Neutrophils (Absolute) 3.39 1.82 - 7.42 K/uL    Lymphs  (Absolute) 1.03 1.00 - 4.80 K/uL    Monos (Absolute) 0.53 0.00 - 0.85 K/uL    Eos (Absolute) 0.24 0.00 - 0.51 K/uL    Baso (Absolute) 0.06 0.00 - 0.12 K/uL    Immature Granulocytes (abs) 0.08 0.00 - 0.11 K/uL    NRBC (Absolute) 0.00 K/uL   ESTIMATED GFR    Collection Time: 03/12/19  5:00 AM   Result Value Ref Range    GFR If African American >60 >60 mL/min/1.73 m 2    GFR If Non African American >60 >60 mL/min/1.73 m 2       Fluids    Intake/Output Summary (Last 24 hours) at 03/12/19 1135  Last data filed at 03/12/19 0800   Gross per 24 hour   Intake              676 ml   Output             3350 ml   Net            -2674 ml       Core Measures & Quality Metrics  Labs reviewed, Medications reviewed and Radiology images reviewed  Robbins catheter: No Robbins      DVT Prophylaxis: Enoxaparin (Lovenox)  DVT prophylaxis - mechanical: SCDs  Ulcer prophylaxis: Not indicated        Total Score: 5    ETOH Screening  CAGE Score: 1  Intervention complete date: 3/4/2019  Patient response to intervention: Denies substance abuse.  Intervention not indicated  .   Patient demonstrats understanding of intervention.Plan of care: No further acute intervention.         Assessment/Plan  A-fib (HCC)- (present on admission)   Assessment & Plan    3/7 New onset afib - amiodarone drip initiated  3/8 Rebolused, converted to sinus  3/9 Transitioned to Amiodarone 400mg TID  - late episode of a-fib requiring iv bolus and drip  3/10 Echocardiogram completed  3/11 Transition to oral Amiodarone- 400mg X 10 doses for full 10g load  3/16- Transition to Amiodarone 200mg daily  EKG for rhythm changes      Left pulmonary contusion- (present on admission)   Assessment & Plan    Supplemental oxygen to maintain SaO2 greater than 93%.  Aggressive pulmonary hygiene and serial chest radiography.      Traumatic pneumothorax and hemothorax- (present on admission)   Assessment & Plan    Left hemopneumothroax on CT.  Chest tube placed on arrival to ICU.  3/2 CT  chest with retained hemothorax.  3/3 Thoracoscopy with evacuation of retained hemothorax.  3/6 To water seal  3/9 Chest tube removed     Closed fracture of multiple ribs with flail chest- (present on admission)   Assessment & Plan    Anterior lateral left 1, 2, 3, 4, 5, 6, 7 rib fractures. Left posterior 6th - 12th  rib fractures. Disruption of the costochondral cartilage involving the left second, third, fourth, fifth, sixth ribs. Flail anterior lateral chest.  Aggressive pulmonary hygiene and multimodal pain management.      Anxiety- (present on admission)   Assessment & Plan    Chronic condition treated with Paxil  Resumed maintenance medication.      Essential hypertension- (present on admission)   Assessment & Plan    Chronic condition treated with Lisinopril  3/9 SBP <110 consistently  Hold Lisinopril at this time.      Closed fracture of sternum- (present on admission)   Assessment & Plan    Nondisplaced inferior sternal fracture. No retrosternal hematoma.  Aggressive pulmonary hygiene and multimodal pain management.      Chronic pain- (present on admission)   Assessment & Plan    Premorbid condition treated with MS Contin 15 mg BID and Oxycodone 15 mg TID.  Home meds resumed.  3/2 Increase MS Contin to 30 BID.  3/5 Increased MS Contin to 45 BID.  3/9 Add skelaxin       Discharge planning issues- (present on admission)   Assessment & Plan    Admission date  2/27/2019  Transfer from SICU  3/1/2019  [facility] S[facility]2019  [facility]  Rehab/SNF consult NA  Medically cleared for discharge NA - managing a-fib  Not discharged: NA  Reasons:  NA  Discharged Date NA     No contraindication to deep vein thrombosis (DVT) prophylaxis- (present on admission)   Assessment & Plan    Chemical DVT prophylaxis (Lovenox) initiated upon admission.  RAP score 5.  Ambulate TID.      H/O clavicle fracture- (present on admission)   Assessment & Plan    Old left clavicle fracture seen on CT chest.      Trauma-  (present on admission)   Assessment & Plan    15 foot fall from roof onto ice chunks.  Trauma Red Activation.  Mike Barragan MD. Trauma Surgery.      Discussed patient condition with RN, Patient and trauma surgery. Dr. Aguayo     Seen  Doing well  Ok to discharge home  Discussed with RN, RT, pharmacy. Pt, and Sofía Aguayo MD

## 2019-03-12 NOTE — CARE PLAN
Problem: Safety  Goal: Will remain free from falls  Outcome: PROGRESSING AS EXPECTED  Bed in low position with bed alarm on. Call light within reach. Lower bed rails in place. Treaded socks in use. Pt near nurses station.       Problem: Venous Thromboembolism (VTW)/Deep Vein Thrombosis (DVT) Prevention:  Goal: Patient will participate in Venous Thrombosis (VTE)/Deep Vein Thrombosis (DVT)Prevention Measures  Outcome: PROGRESSING AS EXPECTED  Mechanical and pharmacological interventions in place

## 2019-03-12 NOTE — CARE PLAN
Problem: Communication  Goal: The ability to communicate needs accurately and effectively will improve  Outcome: MET Date Met: 03/12/19      Problem: Safety  Goal: Will remain free from falls  Outcome: MET Date Met: 03/12/19      Problem: Infection  Goal: Will remain free from infection  Outcome: MET Date Met: 03/12/19      Problem: Venous Thromboembolism (VTW)/Deep Vein Thrombosis (DVT) Prevention:  Goal: Patient will participate in Venous Thrombosis (VTE)/Deep Vein Thrombosis (DVT)Prevention Measures  Outcome: MET Date Met: 03/12/19      Problem: Bowel/Gastric:  Goal: Normal bowel function is maintained or improved  Outcome: MET Date Met: 03/12/19      Problem: Knowledge Deficit  Goal: Knowledge of disease process/condition, treatment plan, diagnostic tests, and medications will improve  Outcome: MET Date Met: 03/12/19      Problem: Respiratory:  Goal: Respiratory status will improve  Outcome: MET Date Met: 03/12/19

## 2019-03-12 NOTE — CARE PLAN
Problem: Respiratory:  Goal: Respiratory status will improve    Intervention: Educate and encourage coughing and deep breathing  IS encouraged and pt participatory      Problem: Mobility  Goal: Risk for activity intolerance will decrease    Intervention: Encourage patient to increase activity level in collaboration with Interdisciplinary Team  Pt ambulatory and moving self around in bed

## 2019-03-12 NOTE — CARE PLAN
Problem: Pain Management  Goal: Pain level will decrease to patient's comfort goal  Outcome: MET Date Met: 03/12/19

## 2019-03-12 NOTE — CARE PLAN
Problem: Mobility  Goal: Risk for activity intolerance will decrease  Outcome: MET Date Met: 03/12/19

## 2019-03-12 NOTE — DISCHARGE INSTRUCTIONS
Discharge Instructions    Discharged to home by car with friend. Discharged via walking, hospital escort: Refused.  Special equipment needed: Not Applicable    Be sure to schedule a follow-up appointment with your primary care doctor or any specialists as instructed.     Discharge Plan:   Diet Plan: Discussed  Activity Level: Discussed  Confirmed Follow up Appointment: Appointment Scheduled (schedule appointment with Dr. Barragan)  Confirmed Symptoms Management: Discussed  Medication Reconciliation Updated: Yes  Pneumococcal Vaccine Administered/Refused: Given (See MAR)  Influenza Vaccine Indication: Not indicated: Previously immunized this influenza season and > 8 years of age    I understand that a diet low in cholesterol, fat, and sodium is recommended for good health. Unless I have been given specific instructions below for another diet, I accept this instruction as my diet prescription.   Other diet: Regular    Special Instructions: None    · Is patient discharged on Warfarin / Coumadin?   No     Depression / Suicide Risk    As you are discharged from this [facility] facility, it is important to learn how to keep safe from harming yourself.    Recognize the warning signs:  · Abrupt changes in personality, positive or negative- including increase in energy   · Giving away possessions  · Change in eating patterns- significant weight changes-  positive or negative  · Change in sleeping patterns- unable to sleep or sleeping all the time   · Unwillingness or inability to communicate  · Depression  · Unusual sadness, discouragement and loneliness  · Talk of wanting to die  · Neglect of personal appearance   · Rebelliousness- reckless behavior  · Withdrawal from people/activities they love  · Confusion- inability to concentrate     If you or a loved one observes any of these behaviors or has concerns about self-harm, here's what you can do:  · Talk about it- your feelings and reasons for harming yourself  · Remove  any means that you might use to hurt yourself (examples: pills, rope, extension cords, firearm)  · Get professional help from the community (Mental Health, Substance Abuse, psychological counseling)  · Do not be alone:Call your Safe Contact- someone whom you trust who will be there for you.  · Call your local CRISIS HOTLINE 611-3688 or 973-688-2506  · Call your local Children's Mobile Crisis Response Team [place] (459) 847-0399 or www."[employer]  · Call the toll free National Suicide Prevention Hotlines   · National Suicide Prevention Lifeline 705-607-MRIU (9475)  · National HitchedPic Line Network 800-SUICIDE (196-7423)        - Call or seek medical attention for questions or concerns   - Follow up with Dr. Carlisle at 1 pm on Friday 3/15/19   - INCREASE MS CONTIN TO 45 MG TWICE A DAY - USE CURRENT PRESCRIPTION   - Follow up with Dr. Barragan in 2 weeks with a follow up CXR - please have done 1 day prior to appointment   - Follow up with primary care provider within one weeks time   - Resume regular diet   - May take over the counter acetaminophen as needed for pain   - Continue daily over the counter stool softener while on narcotics   - No operation of machinery or motorized vehicles while under the influence of narcotics   - No alcohol, marijuana or illicit drug use while under the influence of narcotics   - No swimming, hot tubs, baths or wound submersion until cleared by outpatient provider. May shower   - No contact sports, strenuous activities, or heavy lifting until cleared by outpatient provider   - If respiratory decompensation, change in condition or worsening condition, or signs or symptoms of infection occur seek medical attention

## 2019-03-12 NOTE — PROGRESS NOTES
2 RN skin check complete    -Bruising to L hip/flank area  -Abrasions to old tape sites  -Healing thoracoscopy sites x 2, steri strips in place, well-approximated  -Heal L chest tube site well-approximated, dressing changed    Heels red and blanching. Sacrum intact. Pt turns self in bed.

## 2019-03-12 NOTE — CARE PLAN
Problem: Bowel/Gastric:  Goal: Will not experience complications related to bowel motility  Outcome: MET Date Met: 03/12/19

## 2019-03-31 NOTE — DISCHARGE SUMMARY
DATE OF ADMISSION:  02/27/2019    DATE OF DISCHARGE:  03/12/2019    LENGTH OF STAY:  13 days.    ATTENDING PHYSICIAN:  Mike Barragan MD    CONSULTING PHYSICIAN:  None.    DISCHARGE DIAGNOSES:  1.  Atrial fibrillation.  2.  Closed fracture of multiple ribs with flail chest.  3.  Left pulmonary contusion.  4.  Traumatic pneumothorax and hemothorax.  5.  Anxiety.  6.  Chronic pain.  7.  Closed fracture of sternum.  8.  Essential hypertension.  9.  History of clavicle fracture.  10.  Status post bilateral joint replacements.    PROCEDURES:  On 02/27/2019, Dr. Barragan performed a left chest tube insertion   secondary to a left hemopneumothorax of the chest.    On 03/03/2019, Dr. Barragan performed a thoracoscopy with evacuation of retained   hemothorax.    HISTORY OF PRESENT ILLNESS:  This is a 58-year-old gentleman who was   apparently upon a roof trying to remove ice when the ice dam let loose and the   patient fell approximately 15 feet from the roof.  He was triaged as a trauma   red in accordance with the pre-established hospital guidelines.    On arrival, he underwent extensive radiographic and laboratory studies and was   admitted to the critical care team under the direction and supervision of Dr. Barragan.  He sustained the above injuries and incurred the above diagnoses   during his stay.    The patient was noted to have a flail chest.  For this reason, he was admitted   to the intensive care unit.  He had a left chest tube insertion on admit.  He   remained in the intensive care unit for approximately 2 days and he was   transferred out to the general surgical unit.  Approximately 1 week later, he   was noted to have new onset atrial fibrillation and went back to the intensive   care unit.  He was subsequently discharged from the intensive care unit.    As noted, admit imaging noted an anterolateral left 1, 2, 3, 4, 5, 6, 7 rib   fractures, in addition to left posterior 6-12 rib fractures.  Disruption of    the costochondral cartilage involving the left 2nd, 3rd, 4th, 5th and 6th rib   fractures, which resulted in a flail anterolateral chest.  Again, he underwent   aggressive pulmonary hygiene and multimodal pain management.  He had a left   hemopneumothorax noted on CT scan.  He had a chest tube placed.  On 03/02, a   followup CT noted a retained hemothorax.  On 03/03, he went to the operating   room where a thoracoscopy with evacuation of the retained hemothorax was   completed.  Please see Epic for full procedural details.  On 03/06, he was   placed to waterseal and on 03/09, his subsequent chest tube was removed.    Followup imaging did not note any continued pneumothorax.    Admit also noted a nondisplaced inferior sternal fracture.  There was no   retrosternal hematoma.  Additionally, he had left pulmonary contusion.  He had   supplemental oxygen to keep his oxygen saturations above 92% as well as   aggressive pulmonary hygiene and chest x-rays.    On 03/07, he was noted to be in a new onset atrial fibrillation.  He was   transferred back to the intensive care unit.  An amio drip was initiated.  On   03/09, he was transitioned to oral amiodarone.  He did have another episode of   rapid rate of atrial fibrillation, which required the IV bolus and drip to be   resumed.  On 03/10, he had an echocardiogram completed, which estimated his   left ventricular ejection fraction to be approximately 60%.  On 03/11, he was   transitioned again to oral amiodarone and he was discharged on oral   amiodarone.  He is aware he needs to follow up with his primary care provider   with regards to tapering of his medication.    He does have a history of chronic pain.  His medications included MS Contin   and oxycodone.  We resumed his home meds.  We did have to increase his MS   Contin considerably.  On day of discharge, he was on 45 mg twice a day.    Additionally, we have added Skelaxin.  Upon discharge, I did contact his pain    management clinic.  I set up a followup appointment for the following Friday.    I reviewed his narcotic report.  Based on his previous fill of 02/12, he   should have enough MS Contin to last until the appointment.  He was   additionally provided a small prescription of OxyIR, as the last fill was   02/01.  This was all discussed in great detail with the patient in the clinic.    He is aware that he needs to follow up with the clinic secondary to being on   pain contract.  He is quite aware of this and amiable to this plan.    On day of discharge, he was tolerating regular diet.  He had adequate pain   control.  His heart rate was in regular sinus rhythm.  His tertiary exam was   performed without any further findings.    DISCHARGE PHYSICAL EXAMINATION:  Please see EPIC tertiary exam dated day of   discharge.    DISCHARGE MEDICATIONS:  1.  He may resume his previous home medications.  2.  Skelaxin 800 mg, may take 1 tablet by mouth 3 times a day, #90, no   refills.  3.  Lidoderm 5% patch, apply to affected area every 24 hours, #10, no refills.  4.  Neurontin 300 mg, may take 1 capsule by mouth 3 times a day, #90, no   refills.  5.  Amiodarone 400 mg 3 times a day to complete 10 days, then 200 mg daily   thereafter.  6.  Celebrex 200 mg, may take 1 tablet by mouth 2 times a day, #60, no   refills.  7.  Oxycodone 15 mg, may take 1 tablet by mouth every 6 hours as needed, #16,   no refills.    DISPOSITION:  The patient will be discharged home in stable condition.  He is   aware he cannot fly for 2 weeks.  He is aware he needs to follow up with his   pain management clinic on Friday.  He is aware he needs to follow up with Dr. Barragan in approximately 1 week with a followup x-ray, which a prescription has   been provided for.  He has been extensively counseled on when to seek   emergency treatment such as changing condition, worsening condition, fevers,   signs and symptoms of infection, or any other changes in  condition.  He does   verbalize understanding with regards to this.       ____________________________________     JENNY IRBY DO / MAHSA    DD:  03/30/2019 14:21:01  DT:  03/30/2019 16:10:36    D#:  [id number]  Job#:  [id number]

## 2022-02-09 ENCOUNTER — HOSPITAL ENCOUNTER (OUTPATIENT)
Facility: REHABILITATION | Age: 62
End: 2022-02-09
Attending: ANESTHESIOLOGY | Admitting: ANESTHESIOLOGY
Payer: COMMERCIAL

## 2022-02-09 ENCOUNTER — APPOINTMENT (OUTPATIENT)
Dept: RADIOLOGY | Facility: REHABILITATION | Age: 62
End: 2022-02-09
Attending: ANESTHESIOLOGY
Payer: COMMERCIAL

## 2022-02-09 VITALS
BODY MASS INDEX: 28.49 KG/M2 | OXYGEN SATURATION: 98 % | HEART RATE: 71 BPM | HEIGHT: 73 IN | DIASTOLIC BLOOD PRESSURE: 86 MMHG | TEMPERATURE: 97.9 F | SYSTOLIC BLOOD PRESSURE: 137 MMHG | WEIGHT: 214.95 LBS | RESPIRATION RATE: 16 BRPM

## 2022-02-09 PROCEDURE — 700111 HCHG RX REV CODE 636 W/ 250 OVERRIDE (IP)

## 2022-02-09 PROCEDURE — 99152 MOD SED SAME PHYS/QHP 5/>YRS: CPT

## 2022-02-09 PROCEDURE — 700117 HCHG RX CONTRAST REV CODE 255

## 2022-02-09 PROCEDURE — 62321 NJX INTERLAMINAR CRV/THRC: CPT

## 2022-02-09 RX ORDER — MIDAZOLAM HYDROCHLORIDE 1 MG/ML
INJECTION INTRAMUSCULAR; INTRAVENOUS
Status: COMPLETED
Start: 2022-02-09 | End: 2022-02-09

## 2022-02-09 RX ORDER — BETAMETHASONE SODIUM PHOSPHATE AND BETAMETHASONE ACETATE 3; 3 MG/ML; MG/ML
INJECTION, SUSPENSION INTRA-ARTICULAR; INTRALESIONAL; INTRAMUSCULAR; SOFT TISSUE
Status: COMPLETED
Start: 2022-02-09 | End: 2022-02-09

## 2022-02-09 RX ORDER — ONDANSETRON 2 MG/ML
4 INJECTION INTRAMUSCULAR; INTRAVENOUS
Status: DISCONTINUED | OUTPATIENT
Start: 2022-02-09 | End: 2022-02-09 | Stop reason: HOSPADM

## 2022-02-09 RX ADMIN — MIDAZOLAM HYDROCHLORIDE 1 MG: 1 INJECTION, SOLUTION INTRAMUSCULAR; INTRAVENOUS at 15:29

## 2022-02-09 RX ADMIN — BETAMETHASONE SODIUM PHOSPHATE AND BETAMETHASONE ACETATE 12 MG: 3; 3 INJECTION, SUSPENSION INTRA-ARTICULAR; INTRALESIONAL; INTRAMUSCULAR at 15:36

## 2022-02-09 RX ADMIN — FENTANYL CITRATE 25 MCG: 50 INJECTION, SOLUTION INTRAMUSCULAR; INTRAVENOUS at 15:32

## 2022-02-09 RX ADMIN — IOHEXOL 5 ML: 240 INJECTION, SOLUTION INTRATHECAL; INTRAVASCULAR; INTRAVENOUS; ORAL at 15:36

## 2022-02-09 RX ADMIN — MIDAZOLAM HYDROCHLORIDE 0.5 MG: 1 INJECTION, SOLUTION INTRAMUSCULAR; INTRAVENOUS at 15:32

## 2022-02-09 RX ADMIN — FENTANYL CITRATE 50 MCG: 50 INJECTION, SOLUTION INTRAMUSCULAR; INTRAVENOUS at 15:29

## 2022-02-09 ASSESSMENT — PAIN DESCRIPTION - PAIN TYPE
TYPE: CHRONIC PAIN

## 2022-02-09 NOTE — OR SURGEON
Immediate Post OP Note    Pre-Op Diagnosis Codes:     * Radiculopathy, cervical region [M54.12]      Post-Op Diagnosis Codes:     * Radiculopathy, cervical region [M54.12]      Procedure(s):  C7/T1 interlaminar epidural steroid injection with RACZ catheter up to C3/4 with IV sedation - Wound Class: Clean    Surgeon(s):  Mane Paiz M.D.    Anesthesiologist/Type of Anesthesia:  No anesthesia staff entered./Local    Surgical Staff:  Circulator: Sameera Solano R.N.  Scrub Person: Cristina Thomas  Radiology Technologist: Mima Curz    Specimens removed if any:  * No specimens in log *    Estimated Blood Loss: None    Findings: None    Complications: None        2/9/2022 3:58 PM Mane Paiz M.D.

## 2022-02-09 NOTE — H&P
Anesthesiology History & Physical Note    Date  2/9/2022    Primary Care Physician  Ryder Marshall M.D.      Pre-Op Diagnosis Codes:     * Radiculopathy, cervical region [M54.12]    HPI  This is a 61 y.o. male who presented with cervical spine pain.     Past Medical History:   Diagnosis Date   • Arthritis 10/2017    Osteoarthritis   • Dental disorder 10/2017    Upper and lower dentures   • High cholesterol 10/2017    Hx of, not takine meds   • Pain 4/2/15    shoulders   • Pain 1/19/16     shoulders 7/10   • Psychiatric problem     depression, treated   • Snoring 10/2017    No sleep study       Past Surgical History:   Procedure Laterality Date   • THORACOSCOPY Left 3/3/2019    Procedure: THORACOSCOPY;  Surgeon: Mike Barragan M.D.;  Location: [facility];  Service: General   • SHOULDER ARTHROSCOPY Right 11/28/2017    Procedure: SHOULDER ARTHROSCOPY - SYNOVIAL BIOPSY;  Surgeon: Jodie Harris M.D.;  Location: [facility];  Service: Orthopedics   • EXAM UNDER ANESTHESIA Right 11/28/2017    Procedure: EXAM UNDER ANESTHESIA;  Surgeon: Jodie Harris M.D.;  Location: [facility];  Service: Orthopedics   • SHOULDER DECOMPRESSION ARTHROSCOPIC Right 11/28/2017    Procedure: SHOULDER DECOMPRESSION ARTHROSCOPIC- SUBACROMIAL;  Surgeon: Jodie Harris M.D.;  Location: [facility];  Service: Orthopedics   • SHOULDER ARTHROPLASTY TOTAL Right 2/2/2016    Procedure: SHOULDER ARTHROPLASTY TOTAL;  Surgeon: Jodie Harris M.D.;  Location: [facility];  Service:    • SHOULDER ARTHROPLASTY TOTAL  4/14/2015    Performed by Jodie Harris M.D. at [facility]   • UMBILICAL HERNIA REPAIR  2005   • SHOULDER ARTHROSCOPY  2000    left and open x 1   • VEIN LIGATION  1974    left   • ARTHROTOMY  2000/2001/2006    left/right x 2   • OTHER ORTHOPEDIC SURGERY      9 shoulder surgeries       No current facility-administered medications for this  encounter.     Current Outpatient Medications   Medication Sig Dispense Refill   • amiodarone (CORDARONE) 200 MG Tab Take 1 Tab by mouth every day. 30 Tab 0   • gabapentin (NEURONTIN) 300 MG Cap Take 1 Cap by mouth 3 times a day. 90 Cap 0   • lidocaine (LIDODERM) 5 % Patch Apply 3 Patches to skin as directed every 24 hours. 10 Patch 0   • metaxalone (SKELAXIN) 800 MG Tab Take 1 Tab by mouth 3 times a day. 90 Tab 0   • PARoxetine (PAXIL) 20 MG Tab Take 20 Doses by mouth every morning. Unknown RX Strength      • lisinopril (PRINIVIL, ZESTRIL) 30 MG tablet Take 30 Doses by mouth every morning. Unknown RX Strength      • Morphine Sulfate ER 15 MG Tablet Extended Release 12 hour Abuse-Deterrent Take 15 mg by mouth 2 Times a Day.     • lisinopril (PRINIVIL) 10 MG Tab Take 10 mg by mouth every day.     • morphine SR (MS CONTIN) 60 MG TBCR tablet Take 60 mg by mouth every 12 hours.     • PARoxetine HCl (PAXIL PO) Take 20 mg by mouth every day.         Social History     Socioeconomic History   • Marital status: Unknown     Spouse name: Not on file   • Number of children: Not on file   • Years of education: Not on file   • Highest education level: Not on file   Occupational History   • Not on file   Tobacco Use   • Smoking status: Never Smoker   • Smokeless tobacco: Never Used   Substance and Sexual Activity   • Alcohol use: Yes     Comment: 10 per week   • Drug use: No   • Sexual activity: Not on file   Other Topics Concern   • Not on file   Social History Narrative    ** Merged History Encounter **          Social Determinants of Health     Financial Resource Strain:    • Difficulty of Paying Living Expenses: Not on file   Food Insecurity:    • Worried About Running Out of Food in the Last Year: Not on file   • Ran Out of Food in the Last Year: Not on file   Transportation Needs:    • Lack of Transportation (Medical): Not on file   • Lack of Transportation (Non-Medical): Not on file   Physical Activity:    • Days of  Exercise per Week: Not on file   • Minutes of Exercise per Session: Not on file   Stress:    • Feeling of Stress : Not on file   Social Connections:    • Frequency of Communication with Friends and Family: Not on file   • Frequency of Social Gatherings with Friends and Family: Not on file   • Attends [employer] Services: Not on file   • Active Member of Clubs or Organizations: Not on file   • Attends Club or Organization Meetings: Not on file   • Marital Status: Not on file   Intimate Partner Violence:    • Fear of Current or Ex-Partner: Not on file   • Emotionally Abused: Not on file   • Physically Abused: Not on file   • Sexually Abused: Not on file   Housing Stability:    • Unable to Pay for Housing in the Last Year: Not on file   • Number of Places Lived in the Last Year: Not on file   • Unstable Housing in the Last Year: Not on file       Family History   Problem Relation Age of Onset   • Cancer Unknown        Allergies  Patient has no known allergies.    Review of Systems  Negative    Physical Exam  Vitals reviewed.   HENT:      Head: Normocephalic and atraumatic.      Nose: Nose normal.      Mouth/Throat:      Mouth: Mucous membranes are moist.   Eyes:      Extraocular Movements: Extraocular movements intact.      Pupils: Pupils are equal, round, and reactive to light.   Cardiovascular:      Rate and Rhythm: Normal rate.   Pulmonary:      Effort: Pulmonary effort is normal.   Abdominal:      General: Abdomen is flat.   Musculoskeletal:         General: Normal range of motion.      Cervical back: Normal range of motion.   Skin:     General: Skin is warm.   Neurological:      General: No focal deficit present.      Mental Status: He is alert.   Psychiatric:         Mood and Affect: Mood normal.         Vital Signs                          Labs:                    Radiology:  DX-PORTABLE FLUOROSCOPY < 1 HOUR    (Results Pending)         Assessment/Plan:  Pre-Op Diagnosis Codes:     * Radiculopathy, cervical  region [M54.12]  Procedure(s):  C7/T1 interlaminar epidural steroid injection with RACZ catheter up to C3/4 with IV sedation

## 2022-02-09 NOTE — PROGRESS NOTES
1455 Pt admitted to Pre Procedure area.  Consent signed and post op instructions reviewed with pt.  Medical hx and allergies reviewed.  Hx arthritis and HTN.   Hand off given to procedural RN prior to procedure.     1518 Dr. Paiz in to see pt.

## 2022-02-09 NOTE — PROGRESS NOTES
1548 Pt received to Post Procedure area, report received from RN.  Vital signs taken, pt provided with snack.  Bandage intact, no drainage.  Ice pack offered.    1549 Pt seen by Dr. Paiz post procedure, orders received for discharge.    1605 Pt ambulated without difficulty, accompanied to car.  Discharged to designated .

## 2022-02-09 NOTE — PROGRESS NOTES
Preprocedure assessment completed.  Pertinent health information(HTN, arthritis) communicated to physician and staff prior to time out.  Patient positioned preprocedure by RN, CSTand XRAY.  Foam wedge under ankles for support.

## 2022-02-09 NOTE — OP REPORT
Cervical Interlaminar Epidural with Racz Catheter:  1. C7/T1- Cervical Epidural Steroid Injection (JAYASHREE), interlaminar LEFT side approach with racz catheter advancement to C3/4 level.    2. Fluoroscopic guidance for epidural needle visualization    ANESTHESIA: Local with conscious sedation    ASA CLASSIFICATION: II    PROCEDURE IN DETAIL: Prior to the procedure, the risks and benefits of interventional treatments were discussed, which include: serious bleeding, infection, damage to surrounding tissues, vessels, nerves or organs, paralysis, lung puncture, increased pain, or severe allergic reaction. Such events could lead to serious disability or even death. Patient understood these risks and agreed to proceed.    After informed written consent was obtained, the patient was taken to the fluoroscopy suite and placed in the prone position on the x-ray table. Hemodynamic monitoring was instituted and intravenous sedation was given (see nurses notes).    Following a sterile prep with Chloraprep and drape in the usual sterile fashion, lidocaine 1% was used to anesthetize the skin and subcutaneous tissue overlying the C7/T1 interspace.    Using a 20 gauge Tuohy needle with loss of resistance to preservative free normal saline and midline approach, the epidural space was accessed. A racz catheter was then placed and advanced from the C7/T1 level up to the C3/4 level under fluoroscopic guidance. Final needle placement was confirmed with injection of Omnipaque 240 0.5 mL in the lateral and AP live fluoroscopic image. After negative aspiration for blood and CSF, a total of 12 mg of Celestone, 1.5 mL of 1% PF lidocaine was injected. Total volume 3.5ml.    The patient tolerated the procedure well. There were no complications. Prior to discharge, the patient was given discharge instructions that detailed this procedure and what to expect from an epidural injection and/or steroid medication. The patient was advised to notify our  office immediately if there are any questions or concerns. The patient was then monitored in the recovery room for 20 minutes and then discharged in stable condition.    TOTAL FLUORO TIME: 6 Seconds    Assessment / Plan  1. Cervical radiculopathy  M54.12: Radiculopathy, cervical region      Return to Office  Patient will return to the office as needed.  Encounter Sign-Off  Encounter signed-off by Mane Paiz MD, 02/09/2022.  Encounter performed and documented by Mane Paiz MD  Encounter reviewed & signed by Mane Paiz MD on 02/09/2022 at 3:49pm

## (undated) DEVICE — SUCTION INSTRUMENT YANKAUER BULBOUS TIP W/O VENT (50EA/CA)

## (undated) DEVICE — SET LEADWIRE 5 LEAD BEDSIDE DISPOSABLE ECG (1SET OF 5/EA)

## (undated) DEVICE — LACTATED RINGERS INJ 1000 ML - (14EA/CA 60CA/PF)

## (undated) DEVICE — DRAPE IOBAN II INCISE 23X17 - (10EA/BX 4BX/CA)

## (undated) DEVICE — ELECTRODE DUAL RETURN W/ CORD - (50/PK)

## (undated) DEVICE — TUBING PUMP WITH CONNECTOR REDEUCE (1EA)

## (undated) DEVICE — SUTURE GENERAL

## (undated) DEVICE — BLADE SURGICAL #15 - (50/BX 3BX/CA)

## (undated) DEVICE — SHAVER4.0 AGGRESSIVE + FORMLA (5EA/BX)

## (undated) DEVICE — HUMID-VENT HEAT AND MOISTURE EXCHANGE- (50/BX)

## (undated) DEVICE — ABLATOR WAND SERFAS 90-S CRUISE

## (undated) DEVICE — DRAPE SHOULDER FLUID CONTROL - 77 X 85 (10/CA)

## (undated) DEVICE — TOWELS CLOTH SURGICAL - (4/PK 20PK/CA)

## (undated) DEVICE — GOWN SURGICAL X-LARGE ULTRA - FILM-REINFORCED (20/CA)

## (undated) DEVICE — DRAPE CHEST/BREAST - (12EA/CA)

## (undated) DEVICE — BLADE SURGICAL CLIPPER - (50EA/CA)

## (undated) DEVICE — SPONGE GAUZESTER 4 X 4 4PLY - (128PK/CA)

## (undated) DEVICE — SUTURE 2-0 ETHILON FS - (36/BX) 18 INCH

## (undated) DEVICE — DRAPE LAPAROTOMY T SHEET - (12EA/CA)

## (undated) DEVICE — CHLORAPREP 26 ML APPLICATOR - ORANGE TINT(25/CA)

## (undated) DEVICE — SUTURE 4-0 VICRYL PLUS FS-2 - 27 INCH (36/BX)

## (undated) DEVICE — SLEEVE, VASO, THIGH, MED

## (undated) DEVICE — STERI STRIP COMPOUND BENZOIN - TINCTURE 0.6ML WITH APPLICATOR (40EA/BX)

## (undated) DEVICE — STAPLER SKIN DISP - (6/BX 10BX/CA) VISISTAT

## (undated) DEVICE — TROCAR 12MM THORACOPORT (6EA/BX)

## (undated) DEVICE — GLOVE BIOGEL INDICATOR SZ 7SURGICAL PF LTX - (50/BX 4BX/CA)

## (undated) DEVICE — MASK ANESTHESIA ADULT  - (100/CA)

## (undated) DEVICE — WATER IRRIG. STER. 1500 ML - (9/CA)

## (undated) DEVICE — GLOVE BIOGEL PI ULTRATOUCH SZ 7.0 SURGICAL PF LF- POWDER FREE (50/BX 4BX/CA)

## (undated) DEVICE — GLOVE BIOGEL SZ 6.5 SURGICAL PF LTX (50PR/BX 4BX/CA)

## (undated) DEVICE — NEPTUNE 4 PORT MANIFOLD - (20/PK)

## (undated) DEVICE — KIT ANESTHESIA W/CIRCUIT & 3/LT BAG W/FILTER (20EA/CA)

## (undated) DEVICE — BOVIE  BLADE 6 EXTENDED - (50/PK)

## (undated) DEVICE — CONNECTOR HUBLESS DRAINAGE - ONE WAY (20/BX)

## (undated) DEVICE — GLOVE, LITE (PAIR)

## (undated) DEVICE — ELECTRODE 850 FOAM ADHESIVE - HYDROGEL RADIOTRNSPRNT (50/PK)

## (undated) DEVICE — SYRINGE SAFETY 10 ML 18 GA X 1 1/2 BLUNT LL (100/BX 4BX/CA)

## (undated) DEVICE — SODIUM CHL. IRRIGATION 0.9% 3000ML (4EA/CA 65CA/PF)

## (undated) DEVICE — GLOVE 7.0 LF PF PROTEXIS (50PR/BX)

## (undated) DEVICE — KIT ROOM DECONTAMINATION

## (undated) DEVICE — DRESSING ABDOMINAL PAD STERILE 8 X 10" (360EA/CA)"

## (undated) DEVICE — PAD LAP STERILE 18 X 18 - (5/PK 40PK/CA)

## (undated) DEVICE — PROTECTOR ULNA NERVE - (36PR/CA)

## (undated) DEVICE — PACK SHOULDER ARTHROSCOPY SM - (2EA/CA)

## (undated) DEVICE — SUTURE 2-0 VICRYL PLUS SH - 27 INCH (36/BX)

## (undated) DEVICE — SHEET TRANSVERSE LAP - (12EA/CA)

## (undated) DEVICE — SOD. CHL. INJ. 0.9% 1000 ML - (14EA/CA 60CA/PF)

## (undated) DEVICE — SENSOR SPO2 NEO LNCS ADHESIVE (20/BX) SEE USER NOTES

## (undated) DEVICE — HEAD HOLDER JUNIOR/ADULT

## (undated) DEVICE — TUBE CONNECTING SUCTION - CLEAR PLASTIC STERILE 72 IN (50EA/CA)

## (undated) DEVICE — ANTI-FOG SOLUTION - 60BTL/CA

## (undated) DEVICE — GLOVE BIOGEL SZ 7 SURGICAL PF LTX - (50PR/BX 4BX/CA)

## (undated) DEVICE — SUTURE 3-0 ETHILON FS-1 - (36/BX) 30 INCH

## (undated) DEVICE — GOWN WARMING STANDARD FLEX - (30/CA)

## (undated) DEVICE — Device

## (undated) DEVICE — GLOVE BIOGEL SZ 7.5 SURGICAL PF LTX - (50PR/BX 4BX/CA)

## (undated) DEVICE — GLOVE BIOGEL PI INDICATOR SZ 8.0 SURGICAL PF LF -(50/BX 4BX/CA)

## (undated) DEVICE — CANISTER SUCTION 3000ML MECHANICAL FILTER AUTO SHUTOFF MEDI-VAC NONSTERILE LF DISP  (40EA/CA)

## (undated) DEVICE — TUBING PATIENT W/CONNECTOR REDEUCE (1EA)

## (undated) DEVICE — TAPE CLOTH MEDIPORE 6 INCH - (12RL/CA)

## (undated) DEVICE — DRAPE LARGE 3 QUARTER - (20/CA)

## (undated) DEVICE — SET EXTENSION WITH 2 PORTS (48EA/CA) ***PART #2C8610 IS A SUBSTITUTE*****

## (undated) DEVICE — PACK MAJOR BASIN - (2EA/CA)

## (undated) DEVICE — PAD PREP 24 X 48 CUFFED - (100/CA)

## (undated) DEVICE — GLOVE SZ 7.5 LF PROTEXIS (50PR/BX)

## (undated) DEVICE — SUTURE 2-0 VICRYL PLUS CT-2 - 27 INCH (36/BX)

## (undated) DEVICE — SYRINGE 10 ML CONTROL LL (25EA/BX 4BX/CA)

## (undated) DEVICE — SLING ORTH UNV TIETX VLFM ARM

## (undated) DEVICE — DRAIN CHEST ADULT (6EA/CA) DELETED ITEM  ORDER #15909

## (undated) DEVICE — SODIUM CHL IRRIGATION 0.9% 1000ML (12EA/CA)

## (undated) DEVICE — TROCAR 7MM THORACOPORT - (6/BX)

## (undated) DEVICE — BLOCK

## (undated) DEVICE — SUTURE 0 VICRYL PLUS CT-2 - 27 INCH (36/BX)

## (undated) DEVICE — DRAPE U SPLIT IMP 54 X 76 - (24/CA)

## (undated) DEVICE — DRESSING XEROFORM 1X8 - (50/BX 4BX/CA)

## (undated) DEVICE — BLADE SURGICAL #11 - (50/BX)

## (undated) DEVICE — CLOSURE SKIN STRIP 1/2 X 4 IN - (STERI STRIP) (50/BX 4BX/CA)

## (undated) DEVICE — SLEEVE SHOULDER DISP(ARTHREX) - (6/BX)

## (undated) DEVICE — CANISTER SUCTION RIGID RED 1500CC (40EA/CA)

## (undated) DEVICE — GLOVE BIOGEL SZ 8 SURGICAL PF LTX - (50PR/BX 4BX/CA)

## (undated) DEVICE — ENDO LUNG SHORT - (6EA/CA)

## (undated) DEVICE — CONNECTOR Y TBG CRTY 5 IN 1 STERILE (50EA/CA)

## (undated) DEVICE — TUBING CLEARLINK DUO-VENT - C-FLO (48EA/CA)

## (undated) DEVICE — SHAVER 5.5 RESECTOR FORMULA (5EA/BX )

## (undated) DEVICE — SET SUCTION/IRRIGATION WITH DISPOSABLE TIP (6/CA )PART #0250-070-520 IS A SUB